# Patient Record
Sex: FEMALE | Race: WHITE | NOT HISPANIC OR LATINO | Employment: FULL TIME | URBAN - METROPOLITAN AREA
[De-identification: names, ages, dates, MRNs, and addresses within clinical notes are randomized per-mention and may not be internally consistent; named-entity substitution may affect disease eponyms.]

---

## 2017-03-16 ENCOUNTER — ALLSCRIPTS OFFICE VISIT (OUTPATIENT)
Dept: OTHER | Facility: OTHER | Age: 47
End: 2017-03-16

## 2017-04-17 ENCOUNTER — GENERIC CONVERSION - ENCOUNTER (OUTPATIENT)
Dept: OTHER | Facility: OTHER | Age: 47
End: 2017-04-17

## 2017-04-21 ENCOUNTER — GENERIC CONVERSION - ENCOUNTER (OUTPATIENT)
Dept: OTHER | Facility: OTHER | Age: 47
End: 2017-04-21

## 2017-04-21 LAB
CHOLEST SERPL-MCNC: 178 MG/DL
HDLC SERPL-MCNC: 64 MG/DL
LDL/HDL RATIO (HISTORICAL): 1
LDLC SERPL CALC-MCNC: 65 MG/DL
TRIGL SERPL-MCNC: 245 MG/DL
VLDLC SERPL CALC-MCNC: 49 MG/DL

## 2017-06-17 ENCOUNTER — GENERIC CONVERSION - ENCOUNTER (OUTPATIENT)
Dept: OTHER | Facility: OTHER | Age: 47
End: 2017-06-17

## 2017-06-26 ENCOUNTER — GENERIC CONVERSION - ENCOUNTER (OUTPATIENT)
Dept: OTHER | Facility: OTHER | Age: 47
End: 2017-06-26

## 2017-07-29 ENCOUNTER — GENERIC CONVERSION - ENCOUNTER (OUTPATIENT)
Dept: OTHER | Facility: OTHER | Age: 47
End: 2017-07-29

## 2017-07-31 ENCOUNTER — ALLSCRIPTS OFFICE VISIT (OUTPATIENT)
Dept: OTHER | Facility: OTHER | Age: 47
End: 2017-07-31

## 2017-08-01 ENCOUNTER — GENERIC CONVERSION - ENCOUNTER (OUTPATIENT)
Dept: OTHER | Facility: OTHER | Age: 47
End: 2017-08-01

## 2017-08-11 LAB
ADEQUACY: (HISTORICAL): NORMAL
CLINICIAN PROVIDIED ICD 9 OR 10 (HISTORICAL): NORMAL
COMMENT (HISTORICAL): NORMAL
DIAGNOSIS (HISTORICAL): NORMAL
NOTE: (HISTORICAL): NORMAL
PERFORMED BY (HISTORICAL): NORMAL
REFLEX (HISTORICAL): NORMAL
TEST INFORMATION (HISTORICAL): NORMAL

## 2017-11-10 ENCOUNTER — APPOINTMENT (OUTPATIENT)
Dept: RADIOLOGY | Facility: CLINIC | Age: 47
End: 2017-11-10
Payer: COMMERCIAL

## 2017-11-10 ENCOUNTER — ALLSCRIPTS OFFICE VISIT (OUTPATIENT)
Dept: OTHER | Facility: OTHER | Age: 47
End: 2017-11-10

## 2017-11-10 DIAGNOSIS — M79.645 PAIN OF FINGER OF LEFT HAND: ICD-10-CM

## 2017-11-10 PROCEDURE — 73140 X-RAY EXAM OF FINGER(S): CPT

## 2017-11-11 NOTE — PROGRESS NOTES
Assessment    1  Primary osteoarthritis of first carpometacarpal joint of left hand (715 14) (M18 12)    Plan  Pain of left thumb    · * XR THUMB LEFT FIRST DIGIT-MIN 2V; Status:Active - Retrospective By ProtocolAuthorization; Requested for:10Nov2017;   Alejandro Valiente has what appears to be some arthritic change at the basilar joint of the thumb  I did give her a thumb spica brace  This appears to be the 1st time that she has been suffering with pain due to the arthritic change  There likely is a subtle ligament injury in there that has started this process  She will utilize the brace and if she has difficulties in the future, she will return to see us  Hopefully, the brace will help stabilize the thumb and decrease her discomfort  We did talk about possible need for injection versus further bracing and therapy and possible need in the future for reconstruction of that joint  Discussion/Summary  The patient was counseled regarding diagnostic results,-- instructions for management,-- prognosis,-- patient and family education,-- impressions,-- risks and benefits of treatment options,-- importance of compliance with treatment  Chief Complaint    1  Hand Problem    History of Present Illness  Alejandro Valiente is a 49-year-old female who was referred to see me today by her primary care physician for a left thumb issue  She has noticed over the past 2 weeks that her left thumb has been painful and clicking  The pain is a mild and dull discomfort that is intermittent and worse when it clicks and somewhat better with rest and radiates over the base of the thumb towards the thumb itself  She denies any actual injury recently or remotely to this thumb  It is somewhat painful to grasp objects  Review of Systems   Constitutional: No fever, no chills, feels well, no tiredness, no recent weight gain or loss  Eyes: No complaints of eyesight problems, no red eyes  ENT: no loss of hearing, no nosebleeds, no sore throat  Cardiovascular: No complaints of chest pain, no palpitations, no leg claudication or lower extremity edema  Respiratory: no compliants of shortness of breath, no wheezing, no cough  Gastrointestinal: no complaints of abdominal pain, no constipation, no nausea or diarrhea, no vomiting, no bloody stools  Genitourinary: no complaints of dysuria, no incontinence  Musculoskeletal: as noted in HPI  Integumentary: no complaints of skin rash or lesion, no itching or dry skin, no skin wounds  Neurological: no complaints of headache, no confusion, no numbness or tingling, no dizziness  Endocrine: No complaints of muscle weakness, no feelings of weakness, no frequent urination, no excessive thirst   Psychiatric: No suicidal thoughts, no anxiety, no feelings of depression  Active Problems  1  Hyperlipidemia (272 4) (E78 5)   2  Hypothyroidism (244 9) (E03 9)   3  Oral contraceptive use (V25 41) (Z30 41)   4  Pain of left thumb (729 5) (M79 645)   5  Penicillin allergy (V14 0) (Z88 0)   6  Type 1 diabetes mellitus without complication (650 25) (K86 6)    Past Medical History   · History of hyperlipidemia (V12 29) (Z86 39)    The active problems and past medical history were reviewed and updated today  Surgical History   · History of Appendectomy   · History of Hand Excision Of Tendon Cyst   · History of Neuroplasty Decompression Median Nerve At Carpal Tunnel   · History of Oral Surgery Tooth Extraction   · History of Tonsillectomy    The surgical history was reviewed and updated today  Family History  Mother    · No pertinent family history  Father    · No pertinent family history  Maternal Grandmother    · Family history of malignant neoplasm (V16 9) (Z80 9)    The family history was reviewed and updated today         Social History     · Cultural background   · NON-   · Daily caffeine consumption, 2-3 servings a day   · Dental care, occasionally   · Former smoker (V15 82) (C78 502)   · No alcohol use   · Oral contraceptive use (V25 41) (Z30 41)   · Primary spoken language English   · Racial background   · WHITE  The social history was reviewed and updated today  Current Meds   1  Accu-Chek FastClix Lancets Miscellaneous; Therapy: (Recorded:11May2015) to Recorded   2  Atorvastatin Calcium 10 MG Oral Tablet; Take 1 tablet daily; Therapy: 67SHY4857 to (Last Rx:08Apr2017)  Requested for: 08Apr2017 Ordered   3  EpiPen 2-Tyler 0 3 MG/0 3ML Injection Solution Auto-injector; INJECT 0 3ML INTRAMUSCULARLY AS DIRECTED; Therapy: 66SCE3472 to (Last Rx:23May2016)  Requested for: 69THC6614 Ordered   4  FreeStyle Lite Device; Therapy: (Recorded:11May2015) to Recorded   5  FreeStyle Lite Test In [employer]; Therapy: (Recorded:11May2015) to Recorded   6  Glucagon Emergency 1 MG Injection Kit; USE AS DIRECTED; Therapy: 57MFJ5219 to (Last Rx:23May2016)  Requested for: 03GLI4088 Ordered   7  HumaLOG 100 UNIT/ML Subcutaneous Solution; Therapy: (Recorded:16Mar2017) to Recorded   8  Pirmella 7/7/7 0 5/0 75/1-35 MG-MCG Oral Tablet; take as directed; Therapy: 30DYK7010 to (Last Rx:19Mar2017)  Requested for: 17YKC9342 Ordered   9  Synthroid 112 MCG Oral Tablet (Levothyroxine Sodium); 1 every morning; Therapy: (Recorded:11May2015) to Recorded    The medication list was reviewed and updated today  Allergies  1  Penicillins   2  Sulfites    Vitals   Recorded: [id number] 01:46PM   Heart Rate 93   Systolic 642   Diastolic 79   Height 5 ft    Weight 175 lb 6 oz   BMI Calculated 34 25   BSA Calculated 1 77       Physical Exam    Left First Digit: Appearance: Normal  Tenderness: 1st CMC joint  Palpatory findings include 1st digit crepitus  ROM: Full   Normal  Constitutional - General appearance: Normal   Musculoskeletal - Gait and station: Normal -- Muscle strength/tone: Normal -- Upper extremity compartments: Normal   Cardiovascular - Pulses: Normal -- Examination of extremities for edema and/or varicosities: Normal   Skin - Skin and subcutaneous tissue: Normal   Neurologic - Sensation: Normal -- Upper extremity peripheral neuro exam: Normal   Psychiatric - Orientation to person, place, and time: Normal -- Mood and affect: Normal   Eyes  Conjunctiva and lids: Normal    Pupils and irises: Normal        Results/Data  I personally reviewed the films/images/results in the office today  My interpretation follows  X-ray Review Left thumb x-rays demonstrate moderate arthritic change at the [facility] joint of the thumb as well as some subluxation of that joint        Future Appointments    Date/Time Provider Specialty Site   01/29/2018 05:00 PM [facility]       Signatures   Electronically signed by : CARL Rehman ; Nov 10 2017  2:20PM EST                       (Author)

## 2018-01-09 NOTE — RESULT NOTES
Verified Results  (1) HEMOGLOBIN A1C 01Apr2017 12:00AM Darcella Blamer     Test Name Result Flag Reference   HEMOGLOBIN A1C 8 8 A       Summary / No summary entered :      No summary entered   Documents attached :      Mateo Rivera; Enc: [id number] - Image Encounter -      Richie Ordaz - (Family Medicine) (Additional Information      Document)  [facility]) Lipid Panel With LDL/HDL Ratio 01Apr2017 12:00AM Darcella Blamer     Test Name Result Flag Reference   Cholesterol, Total 178     Triglycerides 245 A    HDL Cholesterol 64     VLDL Cholesterol Anastacio 49 A    LDL Cholesterol Calc 65     LDL/HDL Ratio 1 0        Summary / No summary entered :      No summary entered   Documents attached :      Mateo Rivera; Enc: [id number] - Image Encounter -      Richie Ordaz - (Family Medicine) (Additional Information      Document)  (1) MICROALBUMIN CREATININE RATIO, RANDOM URINE 01Apr2017 12:00AM Darcella Blamer     Test Name Result Flag Reference   MICROALBUMIN,URINE <3     CREATININE URINE 31 0     MICROALBUMIN/ CREAT RATIO <9 7        Summary / No summary entered :      No summary entered   Documents attached :      Mateo Rivera; Enc: [id number] - Image Encounter -      Richie Ordaz - (Family Medicine) (Additional Information      Document)  (1) TSH [id number] 12:00AM Darcella Blamer     Test Name Result Flag Reference   TSH 2 150        Summary / No summary entered :      No summary entered   Documents attached :      Mateo Rivera; Enc: [id number] - Image Encounter -      Martincella Blamer - [facility]) (Additional Information      Document)

## 2018-01-12 VITALS
WEIGHT: 176 LBS | BODY MASS INDEX: 34.55 KG/M2 | OXYGEN SATURATION: 99 % | RESPIRATION RATE: 20 BRPM | DIASTOLIC BLOOD PRESSURE: 70 MMHG | HEIGHT: 60 IN | SYSTOLIC BLOOD PRESSURE: 140 MMHG | HEART RATE: 82 BPM

## 2018-01-12 NOTE — RESULT NOTES
Message   PLEASE CALL   REVIEWED BW   WAS STABLE   HGB A1C WAS 8 2     Verified Results  (1) CBC/ PLT (NO DIFF) [id number] 12:00AM Tennille Poot     Test Name Result Flag Reference   WBC 14 6 x10E3/uL H 3 4-10 8   RBC 3 96 x10E6/uL  3 77-5 28   Hemoglobin 12 3 g/dL  11 1-15 9   Hematocrit 37 3 %  34 0-46  6   MCV 94 fL  79-97   MCH 31 1 pg  26 6-33 0   MCHC 33 0 g/dL  31 5-35 7   RDW 12 7 %  12 3-15 4   Platelets 983 T45Y3/WE  150-379     (1) HEMOGLOBIN A1C [id number] 12:00AM Tennille Poot     Test Name Result Flag Reference   Hemoglobin A1c 8 2 % H 4 8-5 6   Pre-diabetes: 5 7 - 6 4           Diabetes: >6 4           Glycemic control for adults with diabetes: <7 0     (1) MICROALBUMIN CREATININE RATIO, RANDOM URINE [id number] 12:00AM Tennille Poot     Test Name Result Flag Reference   Creatinine, Urine 37 6 mg/dL  Not Estab  **Please note reference interval change**   Microalbumin, Urine 4 5 ug/mL  Not Estab  **Please note reference interval change**   Microalb/Creat Ratio 12 0 mg/g creat  0 0-30 0     (LC) Pap Lb (Liquid-based) [id number] 12:00AM Tennille Poot   No  of containers  Ferol Wilfrido 01 [employer] Collection Vial     Test Name Result Flag Reference   DIAGNOSIS: Comment     NEGATIVE FOR INTRAEPITHELIAL LESION AND MALIGNANCY  THE CYTOLOGY PROCESSING WAS PERFORMED AT THE [facility] LOCATED AT  [facility] 12, [address] [address]  Specimen adequacy: Comment     Satisfactory for evaluation  Endocervical and/or squamous metaplastic  cells (endocervical component) are present  Clinician provided ICD10: Comment     E78 0   Performed by: Katalina Antoine Cytotechnologist (ASCP)     Prabhjot Anna Note: Comment     The Pap smear is a screening test designed to aid in the detection of  premalignant and malignant conditions of the uterine cervix  It is not a  diagnostic procedure and should not be used as the sole means of detecting  cervical cancer    Both false-positive and false-negative reports do occur  [facility]) Written Authorization [id number] 12:00AM Tonja Smitha     Test Name Result Flag Reference   Written Authorization Comment     Written Authorization Received  Authorization received from ORIGINAL REQUISITION 05-  Logged by Christine Clemente     (1) COMPREHENSIVE METABOLIC PANEL [id number] 51:44LU Tonja Smitha     Test Name Result Flag Reference   Glucose, Serum 285 mg/dL H 65-99   BUN 13 mg/dL  6-24   Creatinine, Serum 0 74 mg/dL  0 57-1 00   eGFR If NonAfricn Am 96 mL/min/1 73  >59   eGFR If Africn Am 111 mL/min/1 73  >59   BUN/Creatinine Ratio 18  9-23   Sodium, Serum 137 mmol/L  134-144   Potassium, Serum 4 3 mmol/L  3 5-5 2   Chloride, Serum 99 mmol/L     Carbon Dioxide, Total 21 mmol/L  18-29   Calcium, Serum 9 5 mg/dL  8 7-10 2   Protein, Total, Serum 6 9 g/dL  6 0-8 5   Albumin, Serum 4 6 g/dL  3 5-5 5   Globulin, Total 2 3 g/dL  1 5-4 5   A/G Ratio 2 0  1 1-2 5   Bilirubin, Total 0 2 mg/dL  0 0-1 2   Alkaline Phosphatase, S 86 IU/L     AST (SGOT) 21 IU/L  0-40   ALT (SGPT) 20 IU/L  0-32     (1) LIPID PANEL, FASTING [id number] 12:00AM Tonja Smitha     Test Name Result Flag Reference   Cholesterol, Total 165 mg/dL  100-199   Triglycerides 212 mg/dL H 0-149   HDL Cholesterol 65 mg/dL  >39   According to ATP-III Guidelines, HDL-C >59 mg/dL is considered a  negative risk factor for CHD  VLDL Cholesterol Anastacio 42 mg/dL H 5-40   LDL Cholesterol Calc 58 mg/dL  0-99   T  Chol/HDL Ratio 2 5 ratio units  0 0-4 4   T  Chol/HDL Ratio                                                             Men  Women                                               1/2 Avg  Risk  3 4    3 3                                                   Avg Risk  5 0    4 4                                                2X Avg  Risk  9 6    7 1                                                3X Avg  Risk 23 4   11 0     (1) TSH [id number] 12:00AM Tonja Smitha     Test Name Result Flag Reference   TSH 1 400 uIU/mL 0 790-4 822

## 2018-01-12 NOTE — PROGRESS NOTES
Assessment    1  Encounter for preventive health examination (V70 0) (Z00 00)   2  Type 1 diabetes mellitus (250 01) (E10 9)   3  Hyperlipidemia (272 4) (E78 5)   4  Hypothyroidism (244 9) (E03 9)    Plan  Health Maintenance    · Always use a seat belt and shoulder strap when riding or driving a motor vehicle ;  Status:Complete;   Done: 34LEC4024   · Begin a limited exercise program ; Status:Complete;   Done: 17YCB9876   · Drink plenty of fluids ; Status:Complete;   Done: 29YYP9511   · Eat a low fat and low cholesterol diet ; Status:Complete;   Done: 32UGL0240   · Use a sun block product with an SPF of 15 or more ; Status:Complete;   Done:  21BMG8143   · We encourage all of our patients to exercise regularly  30 minutes of exercise or physical  activity five or more days a week is recommended for children and adults ;  Status:Complete;   Done: 60JWW7269   · We recommend routine visits to a dentist ; Status:Complete;   Done: 76ZNQ0603   · We recommend that you bring your body mass index down to 26 ; Status:Complete;    Done: 63CQR6590   · (1) CBC/ PLT (NO DIFF); Status: In Progress - Specimen/Data Collected;   Done:  81HPG0861   · (1) COMPREHENSIVE METABOLIC PANEL; Status: In Progress - Specimen/Data  Collected;   Done: 49APO4428  Health Maintenance, Encounter for screening mammogram for breast cancer    · * MAMMO SCREENING BILATERAL W CAD; Status:Complete;   Done: 32ZYT3943  12:00AM  Health Maintenance, Encounter for screening mammogram for breast cancer, High  cholesterol, Hyperlipidemia, Hypothyroidism, Type 1 diabetes mellitus    · *VB - Eye Exam; Status:Active; Requested for:84Pji7925;    · *VB-Foot Exam; Status:Resulted - Requires Verification;   Done: 83DAM0321 05:36PM   · EKG/ECG- POC; Status:Active;  Requested for:64Aid8930;    · Routine Venipuncture - POC; Status:Complete;   Done: 32DIB3168   · Follow-up visit in 6 months Evaluation and Treatment  Follow-up  Status: Complete   Done: 51ZJK2584  Health Maintenance, High cholesterol, Hyperlipidemia, Hypothyroidism, Type 1 diabetes  mellitus    · (1) LIPID PANEL, FASTING; Status: In Progress - Specimen/Data Collected;   Done:  19GYT0186   · (1) TSH; Status: In Progress - Specimen/Data Collected;   Done: 31TLI3405  High cholesterol    · (1) MICROALBUMIN CREATININE RATIO, RANDOM URINE; Status: In Progress -  Specimen/Data Collected;   Done: 49WXK7190   · ([facility]) Pap Lb (Liquid-based); Status:Active; Requested for:62Tpo6936;    · Hemoccult Screening - POC; Status:Active; Requested for:87Waj8000;    · Urine Dip Automated- POC; Status:Resulted - Requires Verification;   Done: 86KOG0424  05:07PM  High cholesterol, Type 1 diabetes mellitus    · (1) HEMOGLOBIN A1C; Status: In Progress - Specimen/Data Collected;   Done:  45EFQ4176    Discussion/Summary  health maintenance visit healthy adult female Currently, she eats a healthy diet  cervical cancer screening is current Pap test was done today Breast cancer screening: mammogram is current  Colorectal cancer screening: colorectal cancer screening is current and fecal occult blood testing is needed every year  Osteoporosis screening: bone mineral density testing is not indicated  Screening lab work includes hemoglobin, glucose, lipid profile, thyroid function testing, 25-hydroxyvitamin D and urinalysis  The immunizations are up to date  She was advised to be evaluated by an ophthalmologist and Delmi Knutson  Advice and education were given regarding aerobic exercise, cardiovascular risk reduction, sunscreen use and seat belt use  DISCUSSED HEALTH MAINTENANCE ISSUES  CONTINUE ENDOCRINE VISITS Q 3MONTHS  BW  RV 6 MONTHS  Chief Complaint  Patient is here today for a complete physical exam, PAP and blood work  lb/lpn      History of Present Illness  HM, Adult Female: The patient is being seen for a health maintenance and gynecology evaluation  General Health:  The patient's health since the last visit is described as good  She has regular dental visits  She denies vision problems  She denies hearing loss  Immunizations status: not up to date  Lifestyle:  She consumes a diverse and healthy diet  She has weight concerns  She does not exercise regularly  She does not use tobacco  She denies alcohol use  Reproductive health: the patient is premenopausal    Screening: cancer screening reviewed and current  metabolic screening reviewed and current  risk screening reviewed and current  HPI: [address] [place] [address] Rd RECORD  WEIGHT CONCERNS      Review of Systems    Constitutional: no fever, no chills and not feeling tired  Eyes: no eyesight problems and no purulent discharge from the eyes  ENT: no sore throat and no nasal discharge  Cardiovascular: no chest pain, the heart rate was not fast, no palpitations and no lower extremity edema  Respiratory: no shortness of breath, no cough, no wheezing and no shortness of breath during exertion  Gastrointestinal: no abdominal pain, no nausea, no vomiting and no diarrhea  Genitourinary: no dysuria  Musculoskeletal: no arthralgias, no joint swelling, no myalgias and no joint stiffness  Integumentary: no rashes  Neurological: no headache, no numbness, no tingling and no dizziness  Psychiatric: no anxiety and no depression  Endocrine: no muscle weakness  Hematologic/Lymphatic: no swollen glands  ROS reviewed  Active Problems    1  Encounter for screening mammogram for breast cancer (V76 12) (Z12 31)   2  High cholesterol (272 0) (E78 0)   3  Hyperlipidemia (272 4) (E78 5)   4  Hypothyroidism (244 9) (E03 9)   5  Oral contraceptive use (V25 41) (Z30 41)   6   Type 1 diabetes mellitus (250 01) (E10 9)    Past Medical History    · History of Breast cancer screening (V76 10) (Z12 39)   · History of hyperlipidemia (V12 29) (Z86 39)    Surgical History    · History of Appendectomy   · History of Hand Excision Of Tendon Cyst   · History of Neuroplasty Decompression Median Nerve At Carpal Tunnel   · History of Oral Surgery Tooth Extraction   · History of Tonsillectomy    Family History  Mother    · No pertinent family history  Father    · No pertinent family history    Social History    · Cultural background   · NON-   · Daily caffeine consumption, 2-3 servings a day   · Former smoker (V15 82) (B42 643)   · No alcohol use   · Oral contraceptive use (V25 41) (Z30 41)   · Primary spoken language English   · Racial background   · WHITE    Current Meds   1  Accu-Chek FastClix Lancets Miscellaneous; Therapy: (Recorded:11May2015) to Recorded   2  Alyacen 7/7/7 0 5/0 75/1-35 MG-MCG Oral Tablet; as directed; Therapy: 42PPK1417 to (Evaluate:18Ury5455)  Requested for: 02Apr2016; Last   Rx:02Apr2016 Ordered   3  Atorvastatin Calcium 10 MG Oral Tablet; 1 every day; Therapy: 62RAP1575 to (Evaluate:14Jun2016)  Requested for: 15Apr2016; Last   Rx:21Jji1260 Ordered   4  Dasetta 7/7/7 0 5/0 75/1-35 MG-MCG Oral Tablet; as directed; Therapy: 63Evl0270 to (Evaluate:62Cez7887)  Requested for: 83NFD7234; Last   TY:88OWT4413 Ordered   5  FreeStyle Lite Device; Therapy: (Recorded:11May2015) to Recorded   6  FreeStyle Lite Test In [employer]; Therapy: (Recorded:11May2015) to Recorded   7  NovoLOG 100 UNIT/ML Subcutaneous Solution; Sliding Scale administer via pump; Therapy: (Fayette Lesches) to Recorded   8  Synthroid 112 MCG Oral Tablet; 1 every morning; Therapy: (Recorded:11May2015) to Recorded    Allergies    1  Penicillins   2   Sulfites    Vitals   Recorded: 76YYW0653 04:51PM   Temperature 98 1 F, Tympanic    Heart Rate 84, L Radial    Pulse Quality Normal, L Radial    Respiration 16    Respiration Quality Normal    Systolic 313, LUE, Sitting    Diastolic 86, LUE, Sitting    Height 4 ft 11 75 in    Weight 163 lb     BMI Calculated 32 1    BSA Calculated 1 71    Patient Refused Height No No   Patient Refused Weight No No     Physical Exam    Constitutional   General appearance: No acute distress, well appearing and well nourished  Head and Face   Head and face: Normal     Palpation of the face and sinuses: No sinus tenderness  Eyes   Conjunctiva and lids: No swelling, erythema or discharge  Pupils and irises: Equal, round, reactive to light  Ophthalmoscopic examination: Normal fundi and optic discs  Ears, Nose, Mouth, and Throat   External inspection of ears and nose: Normal     Otoscopic examination: Tympanic membranes translucent with normal light reflex  Canals patent without erythema  Nasal mucosa, septum, and turbinates: Normal without edema or erythema  Lips, teeth, and gums: Normal, good dentition  Oropharynx: Normal with no erythema, edema, exudate or lesions  Neck   Neck: Supple, symmetric, trachea midline, no masses  Thyroid: Normal, no thyromegaly  Pulmonary   Respiratory effort: No increased work of breathing or signs of respiratory distress  Auscultation of lungs: Clear to auscultation  Cardiovascular   Auscultation of heart: Normal rate and rhythm, normal S1 and S2, no murmurs  Carotid pulses: 2+ bilaterally  Abdominal aorta: Normal     Femoral pulses: 2+ bilaterally  Pedal pulses: 2+ bilaterally  Peripheral vascular exam: Normal     Examination of extremities for edema and/or varicosities: Normal     Chest   Palpation of breasts and axillae: Normal, no masses palpated  Abdomen   Abdomen: Non-tender, no masses  Liver and spleen: No hepatomegaly or splenomegaly  Examination for hernias: No hernia appreciated  Anus, perineum, and rectum: Normal sphincter tone, no masses, no prolapse  Stool sample for occult blood: Negative  STOOL HEME NEG  Genitourinary   External genitalia and vagina: Normal, no lesions appreciated  Urethra: Normal, no discharge  Bladder: Not distended, no tenderness  Cervix: Normal, no lesions, Pap obtained      Uterus: Normal size, no tenderness, no masses  Adnexa/Parametria: Normal, no masses or tenderness  Lymphatic   Palpation of lymph nodes in neck: No lymphadenopathy  Palpation of lymph nodes in axillae: No lymphadenopathy  Palpation of lymph nodes in groin: No lymphadenopathy  Musculoskeletal   Gait and station: Normal     Digits and nails: Normal without clubbing or cyanosis  Joints, bones, and muscles: Normal     Range of motion: Normal     Stability: Normal     Muscle strength/tone: Normal     Skin   Skin and subcutaneous tissue: Normal without rashes or lesions  Neurologic   Cranial nerves: Cranial nerves II-XII intact  Cortical function: Normal mental status  Reflexes: 2+ and symmetric  Sensation: No sensory loss  Coordination: Normal finger to nose and heel to shin  Psychiatric   Judgment and insight: Normal     Orientation to person, place, and time: Normal     Mood and affect: Normal     Right Foot Findings: normal foot  The toes were normal  Left Foot Findings: normal foot  The toes were normal    Monofilament Testing: normal tactile sensation with monofilament testing throughout both feet  Vascular: Pulses: 2+ in the posterior tibialis and 2+ in the dorsalis pedis  Pulses: 2+ in the posterior tibialis and 2+ in the dorsalis pedis  Assign Risk Category: 0: No loss of protective sensation, no deformity  No present risk        Results/Data  Urine Dip Automated- POC [id number] 05:07PM Cindra      Test Name Result Flag Reference   Color Yellow     Clarity Hazy     Leukocytes Negative     Nitrite Negative     Blood Negative     Bilirubin Negative     Urobilinogen Normal     Protein Negative     Ph 6 0     Specific Gravity 1 020     Ketone Negative     Glucose >1000       PHQ-2 Adult Depression Screening 48DBG3402 04:57PM User, Babbles     Test Name Result Flag Reference   PHQ-2 Adult Depression Score 0     Over the last two weeks, how often have you been bothered by any of the following problems?   Little interest or pleasure in doing things: Not at all - 0  Feeling down, depressed, or hopeless: Not at all - 0   PHQ-2 Adult Depression Screening Negative       * MAMMO SCREENING BILATERAL W CAD 12UZV2114 12:00AM Kit Santos     Test Name Result Flag Reference   [facility]AREN [facility] SCREENING BILATERAL W CAD 05/21/2016       Summary / No summary entered :      No summary entered  Documents attached :      Leonard Gray: [id number] - Image Encounter - Mario Graham -      (Family Medicine) (Result Document)    Signatures   Electronically signed by : Bill Casanova MD; May 23 2016  5:36PM EST                       (Author)

## 2018-01-13 VITALS
BODY MASS INDEX: 34.55 KG/M2 | HEIGHT: 60 IN | DIASTOLIC BLOOD PRESSURE: 80 MMHG | OXYGEN SATURATION: 99 % | TEMPERATURE: 98.8 F | HEART RATE: 99 BPM | WEIGHT: 176 LBS | SYSTOLIC BLOOD PRESSURE: 128 MMHG | RESPIRATION RATE: 20 BRPM

## 2018-01-14 VITALS
BODY MASS INDEX: 34.43 KG/M2 | SYSTOLIC BLOOD PRESSURE: 125 MMHG | DIASTOLIC BLOOD PRESSURE: 79 MMHG | HEART RATE: 93 BPM | WEIGHT: 175.38 LBS | HEIGHT: 60 IN

## 2018-04-14 LAB — HBA1C MFR BLD HPLC: 9.3 %

## 2018-05-22 DIAGNOSIS — Z30.41 ORAL CONTRACEPTIVE PILL SURVEILLANCE: Primary | ICD-10-CM

## 2018-05-22 DIAGNOSIS — Z30.41 ORAL CONTRACEPTIVE PILL SURVEILLANCE: ICD-10-CM

## 2018-05-22 RX ORDER — NORETHINDRONE AND ETHINYL ESTRADIOL 7 DAYS X 3
KIT ORAL
Qty: 84 TABLET | Refills: 1 | Status: SHIPPED | OUTPATIENT
Start: 2018-05-22 | End: 2019-03-21 | Stop reason: SDUPTHER

## 2018-07-14 LAB
CREAT ?TM UR-SCNC: 46.2 UMOL/L
EXT MICROALBUMIN URINE RANDOM: 21.1
HBA1C MFR BLD HPLC: 8.9 %
MICROALBUMIN/CREAT UR: 45.7 MG/G{CREAT}

## 2018-09-28 ENCOUNTER — APPOINTMENT (OUTPATIENT)
Dept: RADIOLOGY | Facility: CLINIC | Age: 48
End: 2018-09-28
Payer: COMMERCIAL

## 2018-09-28 ENCOUNTER — OFFICE VISIT (OUTPATIENT)
Dept: OBGYN CLINIC | Facility: CLINIC | Age: 48
End: 2018-09-28
Payer: COMMERCIAL

## 2018-09-28 VITALS
HEIGHT: 61 IN | SYSTOLIC BLOOD PRESSURE: 139 MMHG | BODY MASS INDEX: 33.27 KG/M2 | WEIGHT: 176.2 LBS | HEART RATE: 87 BPM | DIASTOLIC BLOOD PRESSURE: 86 MMHG

## 2018-09-28 DIAGNOSIS — M25.511 RIGHT SHOULDER PAIN, UNSPECIFIED CHRONICITY: Primary | ICD-10-CM

## 2018-09-28 DIAGNOSIS — M25.511 RIGHT SHOULDER PAIN, UNSPECIFIED CHRONICITY: ICD-10-CM

## 2018-09-28 DIAGNOSIS — S46.911A SHOULDER STRAIN, RIGHT, INITIAL ENCOUNTER: ICD-10-CM

## 2018-09-28 PROCEDURE — 99214 OFFICE O/P EST MOD 30 MIN: CPT | Performed by: ORTHOPAEDIC SURGERY

## 2018-09-28 PROCEDURE — 73030 X-RAY EXAM OF SHOULDER: CPT

## 2018-09-28 RX ORDER — EPINEPHRINE 0.3 MG/.3ML
0.3 INJECTION SUBCUTANEOUS
COMMUNITY
Start: 2016-05-23 | End: 2018-12-10 | Stop reason: SDUPTHER

## 2018-09-28 RX ORDER — ATORVASTATIN CALCIUM 10 MG/1
1 TABLET, FILM COATED ORAL DAILY
COMMUNITY
Start: 2014-10-05 | End: 2018-10-08 | Stop reason: SDUPTHER

## 2018-09-28 RX ORDER — BLOOD-GLUCOSE METER
KIT MISCELLANEOUS
COMMUNITY
End: 2022-07-05

## 2018-09-28 RX ORDER — LEVOTHYROXINE SODIUM 112 UG/1
TABLET ORAL
COMMUNITY
End: 2020-05-03 | Stop reason: ALTCHOICE

## 2018-09-28 NOTE — PROGRESS NOTES
Assessment/Plan:  1  Right shoulder pain, unspecified chronicity  XR shoulder 2+ vw right   2  Shoulder strain, right, initial encounter       Scribe Attestation    I,:   Rinawoody Chadwick Machuca am acting as a scribe while in the presence of the attending physician :        I,:   Daniel Mendoza MD personally performed the services described in this documentation    as scribed in my presence :            Prince Parsons is presenting with signs and symptoms consistent with a right shoulder strain with  paraspinal involvement  Currently it is appears as though this injury has resolved  I did provide her exercises to perform at home that address shoulder strengthening and cervical range of motion  Prince Parsons is a type 1 diabetic  Her most recent A1c reading was 8 1  I explained to her that this puts her at risk for adhesive capsulitis  It is important for her to maintain shoulder range of motion  She has excellent range of motion upon examination today  She can follow up with me as needed for this    Subjective:    Elgin Parra is a 46 y o  diabetic female who presents today for evaluation of a previous pain that she was experiencing the past few weeks  She states she did not suffer from any traumatic events but developed a pain located in the periscapular region of the right shoulder  She states this intermittent pain could be sharp and severe and radiates into the right trapezius region  She had no complaints of distal paresthesias  She would apply heat and take ibuprofen for the relief  She states that at this time her pain has resolved  She will experience mild discomfort when rotating the head to the far left or cervical flexion  Review of Systems   Constitutional: Negative for chills, fever and unexpected weight change  HENT: Negative for hearing loss, nosebleeds and sore throat  Eyes: Negative for pain, redness and visual disturbance  Respiratory: Negative for cough, shortness of breath and wheezing  Cardiovascular: Negative for chest pain, palpitations and leg swelling  Gastrointestinal: Negative for abdominal pain, nausea and vomiting  Endocrine: Negative for polydipsia and polyuria  Genitourinary: Negative for dysuria and hematuria  Musculoskeletal:        See HPI   Skin: Negative for rash and wound  Neurological: Negative for dizziness, numbness and headaches  Psychiatric/Behavioral: Negative for decreased concentration and suicidal ideas  The patient is not nervous/anxious  Past Medical History:   Diagnosis Date    Diabetes ([address] )     Disease of thyroid gland     Hyperlipidemia        Past Surgical History:   Procedure Laterality Date    APPENDECTOMY      CARPAL TUNNEL RELEASE Bilateral     GANGLION CYST EXCISION      TONSILLECTOMY      WISDOM TOOTH EXTRACTION         No family history on file  Social History     Occupational History    Not on file  Social History Main Topics    Smoking status: Former Smoker    Smokeless tobacco: Never Used    Alcohol use No    Drug use: No    Sexual activity: Not on file         Current Outpatient Prescriptions:     atorvastatin (LIPITOR) 10 mg tablet, Take 1 tablet by mouth daily, Disp: , Rfl:     Blood Glucose Monitoring Suppl (FREESTYLE FREEDOM LITE) w/Device KIT, by Does not apply route, Disp: , Rfl:     EPINEPHrine (EPIPEN 2-JASON) 0 3 mg/0 3 mL SOAJ, Inject 0 3 mL as directed, Disp: , Rfl:     glucagon (GLUCAGON EMERGENCY) 1 MG injection, Inject as directed, Disp: , Rfl:     insulin lispro (HUMALOG) 100 units/mL injection, Inject under the skin, Disp: , Rfl:     levothyroxine (SYNTHROID) 112 mcg tablet, Take by mouth, Disp: , Rfl:     PIRMELLA 7/7/7 0 5/0 75/1-35 MG-MCG per tablet, TAKE AS DIRECTED, Disp: 84 tablet, Rfl: 1    Allergies   Allergen Reactions    Penicillins     Sulfites        Objective:  Vitals:    09/28/18 0756   BP: 139/86   Pulse: 87    The cervical spine is nontender    She has full pain-free range of motion  Normal sensations to light touch in the upper extremities  Right Shoulder Exam     Tenderness   The patient is experiencing tenderness in the biceps tendon  Range of Motion   Active Abduction: 170   Forward Flexion: 180   External Rotation: 80   Internal Rotation 0 degrees: T10     Muscle Strength   Abduction: 5/5   Internal Rotation: 5/5   External Rotation: 5/5   Supraspinatus: 5/5   Subscapularis: 5/5   Biceps: 5/5     Tests   Impingement: negative    Other   Erythema: absent  Sensation: normal  Pulse: present (2+ radial)    Comments:  Empty can and full can negative  Physical Exam   Constitutional: She is oriented to person, place, and time  She appears well-developed and well-nourished  HENT:   Head: Normocephalic and atraumatic  Eyes: Conjunctivae are normal    Neck: Neck supple  Cardiovascular: Intact distal pulses  Pulmonary/Chest: Effort normal    Neurological: She is alert and oriented to person, place, and time  Skin: Skin is warm and dry  Psychiatric: She has a normal mood and affect  Her behavior is normal    Vitals reviewed  I have personally reviewed pertinent films in PACS and my interpretation is as follows:  Xrays of the right shoulder are normal   There is no evidence of acute fracture or other osseous abnormality

## 2018-09-28 NOTE — PATIENT INSTRUCTIONS
Exercises for Shoulder Abduction and Adduction   WHAT YOU NEED TO KNOW:   Shoulder abduction and adduction exercises work the muscles at the back of your shoulder and your upper back  DISCHARGE INSTRUCTIONS:   Contact your healthcare provider if:   · You have sharp or worsening pain during exercise or at rest     · You have questions or concerns about your shoulder exercises  Before you exercise:  Warm up and stretch before you exercise  Walk or ride a stationary bike for 5 to 10 minutes to help you warm up  Stretching helps increase range of motion  It may also decrease muscle soreness and help prevent another injury  Your healthcare provider will tell you which of the following stretches to do:  · Crossover arm stretch:  Relax your shoulders  Hold your upper arm with the opposite hand  Pull your arm across your chest until you feel a stretch  Hold the stretch for 30 seconds  Return to the starting position  · Shoulder flexion stretch:  Stand facing a wall  Slowly walk your fingers up the wall until you feel a stretch  Hold the stretch for 30 seconds  Return to the starting position  · Sleeper stretch:  Lie on your injured side on a firm, flat surface  Bend the elbow of your injured arm 90° with your hand facing up  Use your arm that is not injured to slowly push your injured arm down  Stop when you feel a stretch at the back of your injured shoulder  Hold the stretch for 30 seconds  Slowly return to the starting position  How to exercise with a weight:  Your healthcare provider will tell you how much weight to use  · Shoulder abduction:  Stand and hold a weight in your hand with your palm facing your body  Slowly raise your arm to the side with your thumb pointing up  Then raise your arm over your head as far as you can without pain  Hold this position for as long as directed  Do not raise your arm over your head unless your healthcare provider says it is okay        · Shoulder adduction:  Lie on your back on a firm surface  Extend your arm out to a "T " Bend your elbow so your forearm in the air  Hold a weight in your hand  Slowly raise your arm toward the ceiling and straighten your elbow  Hold this position for as long as directed  Slowly return to the starting position  How to exercise with an exercise band:   · Shoulder abduction:  Wrap the exercise band around a heavy, stable object near your foot  Grab the band with the hand of your injured shoulder  Keep your arm straight  Slowly raise your arm to the side with your thumb pointing up  Then, slowly pull the band over your head as far as you can without pain  Do not raise your arm over your head unless your healthcare provider says it is okay  Do not let your shoulder shrug  Hold this position for as long as directed  Slowly return to the starting position  · Shoulder adduction:  Wrap the exercise band around a heavy, stable object  Stand and face away from where the band is anchored  Hold each end of the band in both hands with your elbows bent  Your elbows should not be behind your body  Keep your arms parallel to the floor and slowly straighten your elbows  Hold this position for as long as directed  Slowly return to the starting position  Follow up with your physical therapist as directed:  Write down your questions so you remember to ask them at your visits  © 2017 [address] Information is for End User's use only and may not be sold, redistributed or otherwise used for commercial purposes  All illustrations and images included in CareNotes® are the copyrighted property of A D A M [employer]  or Russ Wade  The above information is an  only  It is not intended as medical advice for individual conditions or treatments  Talk to your doctor, nurse or pharmacist before following any medical regimen to see if it is safe and effective for you    Neck Exercises   WHAT YOU NEED TO KNOW:   Why is it important to do neck exercises? Neck exercises help reduce neck pain, and improve neck movement and strength  Neck exercises also help prevent long-term neck problems  What do I need to know about neck exercises? · Do the exercises every day,  or as often as directed by your healthcare provider  · Move slowly, gently, and smoothly  Avoid fast or jerky motions  · Stand and sit the way your healthcare provider shows you  Good posture may reduce your neck pain  Check your posture often, even when you are not doing your neck exercises  How do I perform neck exercises safely? · Exercise position:  You may sit or stand while you do neck exercises  Face forward  Your shoulders should be straight and relaxed, with a good posture  · Head tilts, forward and back:  Gently bow your head and try to touch your chin to your chest  Your healthcare provider may tell you to push on the back of your neck to help bow your head  Raise your chin back to the starting position  Tilt your head back as far as possible so you are looking up at the ceiling  Your healthcare provider may tell you to lift your chin to help tilt your head back  Return your head to the starting position  · Head tilts, side to side:  Tilt your head, bringing your ear toward your shoulder  Then tilt your head toward the other shoulder  · Head turns:  Turn your head to look over your shoulder  Tilt your chin down and try to touch it to your shoulder  Do not raise your shoulder to your chin  Face forward again  Do the same on the other side  · Head rolls:  Slowly bring your chin toward your chest  Next, roll your head to the right  Your ear should be positioned over your shoulder  Hold this position for 5 seconds  Roll your head back toward your chest and to the left into the same position  Hold for 5 seconds  Gently roll your head back and around in a clockwise [place] 3 times   Next, move your head in the reverse direction (counterclockwise) in a [place] 3 times  Do not shrug your shoulders upwards while you do this exercise  When should I contact my healthcare provider? · Your pain does not get better, or gets worse  · You have questions or concerns about your condition, care, or exercise program   CARE AGREEMENT:   You have the right to help plan your care  Learn about your health condition and how it may be treated  Discuss treatment options with your caregivers to decide what care you want to receive  You always have the right to refuse treatment  The above information is an  only  It is not intended as medical advice for individual conditions or treatments  Talk to your doctor, nurse or pharmacist before following any medical regimen to see if it is safe and effective for you  © 2017 [address] Information is for End User's use only and may not be sold, redistributed or otherwise used for commercial purposes  All illustrations and images included in CareNotes® are the copyrighted property of [employer]  or Russ Wade

## 2018-10-08 DIAGNOSIS — E78.01 FAMILIAL HYPERCHOLESTEROLEMIA: Primary | ICD-10-CM

## 2018-10-08 RX ORDER — ATORVASTATIN CALCIUM 10 MG/1
TABLET, FILM COATED ORAL DAILY
Qty: 90 TABLET | Refills: 1 | Status: SHIPPED | OUTPATIENT
Start: 2018-10-08 | End: 2019-03-20 | Stop reason: SDUPTHER

## 2018-10-13 LAB
HBA1C MFR BLD HPLC: 8.8 %
HBA1C MFR BLD HPLC: 8.8 %

## 2018-11-13 ENCOUNTER — OFFICE VISIT (OUTPATIENT)
Dept: FAMILY MEDICINE CLINIC | Facility: CLINIC | Age: 48
End: 2018-11-13
Payer: COMMERCIAL

## 2018-11-13 VITALS
OXYGEN SATURATION: 98 % | WEIGHT: 164 LBS | TEMPERATURE: 98.1 F | DIASTOLIC BLOOD PRESSURE: 84 MMHG | HEART RATE: 88 BPM | RESPIRATION RATE: 16 BRPM | HEIGHT: 60 IN | BODY MASS INDEX: 32.2 KG/M2 | SYSTOLIC BLOOD PRESSURE: 140 MMHG

## 2018-11-13 DIAGNOSIS — Z12.11 COLON CANCER SCREENING: ICD-10-CM

## 2018-11-13 DIAGNOSIS — Z00.00 ROUTINE GENERAL MEDICAL EXAMINATION AT A HEALTH CARE FACILITY: Primary | ICD-10-CM

## 2018-11-13 DIAGNOSIS — E10.9 TYPE 1 DIABETES MELLITUS WITHOUT COMPLICATION (HCC): ICD-10-CM

## 2018-11-13 DIAGNOSIS — Z12.31 ENCOUNTER FOR SCREENING MAMMOGRAM FOR BREAST CANCER: ICD-10-CM

## 2018-11-13 DIAGNOSIS — E78.01 FAMILIAL HYPERCHOLESTEROLEMIA: ICD-10-CM

## 2018-11-13 DIAGNOSIS — E03.9 HYPOTHYROIDISM, UNSPECIFIED TYPE: ICD-10-CM

## 2018-11-13 DIAGNOSIS — Z13.6 SCREENING FOR HYPERTENSION: ICD-10-CM

## 2018-11-13 DIAGNOSIS — Z23 NEED FOR TETANUS BOOSTER: ICD-10-CM

## 2018-11-13 PROCEDURE — 99396 PREV VISIT EST AGE 40-64: CPT | Performed by: FAMILY MEDICINE

## 2018-11-13 PROCEDURE — 90471 IMMUNIZATION ADMIN: CPT

## 2018-11-13 PROCEDURE — 90715 TDAP VACCINE 7 YRS/> IM: CPT

## 2018-11-13 PROCEDURE — 93000 ELECTROCARDIOGRAM COMPLETE: CPT | Performed by: FAMILY MEDICINE

## 2018-11-13 NOTE — PATIENT INSTRUCTIONS
DISCUSSED HEALTH ISSUES  HEALTHY DIET AND EXERCISE  BW WILL BE OBTAINED  MAMMOGRAPHY   RECOMMEND CALCIUM 1252-3347 MG DAILY  VITAMIN D3  1000 IU DAILY  RV IN 1 YEAR FOR ANNUAL EXAM, SOONER IF NEEDED

## 2018-11-14 LAB — ECG INTERP DURING EX: NORMAL MS

## 2018-11-14 NOTE — PROGRESS NOTES
FAMILY PRACTICE HEALTH MAINTENANCE OFFICE VISIT  [facility] PH[facility]ANS    NAME: Enrrique Iyer  AGE: 46 y o  SEX: female  : 1970     DATE: 2018    Assessment and Plan     Problem List Items Addressed This Visit        Endocrine    Type 1 diabetes mellitus without complication ([facility] [address] )    Hypothyroidism       Other    Hyperlipidemia    Routine general medical examination at a health care facility - Primary    Screening for hypertension    Relevant Orders    POCT ECG (Completed)    POCT urine dip auto non-scope    Encounter for screening mammogram for breast cancer    Relevant Orders    Mammo screening bilateral w cad    Colon cancer screening    Relevant Orders    Cologuard    Need for tetanus booster    Relevant Orders    TDAP VACCINE GREATER THAN OR EQUAL TO 8YO IM (Completed)               Return in about 6 months (around 2019) for Recheck  Chief Complaint     Chief Complaint   Patient presents with    Physical Exam     PAP 2017, refill epi pen & glucogon    Screening Colonoscopy     colonoscopy vs cologuard    Screening Mammo    Immunizations     Td, Shringrix, Pneumonia shot - please discuss all       History of Present Illness     DISCUSSED HEALTH ISSUES  REVIEWED MEDICAL RECORD  NO CONCERNS AT THIS TIME          Well Adult Physical   Patient here for a comprehensive physical exam       Diet and Physical Activity  Diet: well balanced diet  Weight concerns: Patient has class 1 obesity (BMI 30-34  9)  Exercise: infrequently      Depression Screen  PHQ-9 Depression Screening    PHQ-9:    Frequency of the following problems over the past two weeks:       Little interest or pleasure in doing things:  0 - not at all  Feeling down, depressed, or hopeless:  0 - not at all  PHQ-2 Score:  0          General Health  Hearing: Normal:  bilateral  Vision: no vision problems  Dental: regular dental visits    Reproductive Health          The following portions of the patient's history were reviewed and updated as appropriate: allergies, current medications, past family history, past medical history, past social history, past surgical history and problem list     Review of Systems     Review of Systems   Constitutional: Negative for chills, fatigue and fever  HENT: Negative for congestion, ear discharge, ear pain, mouth sores, postnasal drip, sore throat and trouble swallowing  Eyes: Negative for pain, discharge and visual disturbance  Respiratory: Negative for cough, shortness of breath and wheezing  Cardiovascular: Negative for chest pain, palpitations and leg swelling  Gastrointestinal: Negative for abdominal distention, abdominal pain, blood in stool, diarrhea and nausea  Endocrine: Negative for polydipsia, polyphagia and polyuria  Genitourinary: Negative for dysuria, frequency, hematuria and urgency  Musculoskeletal: Negative for arthralgias, gait problem and joint swelling  Skin: Negative for pallor and rash  Neurological: Negative for dizziness, syncope, speech difficulty, weakness, light-headedness, numbness and headaches  Hematological: Negative for adenopathy  Psychiatric/Behavioral: Negative for behavioral problems, confusion and sleep disturbance  The patient is not nervous/anxious          Past Medical History     Past Medical History:   Diagnosis Date    Diabetes ([facility] [address]     Disease of thyroid gland     Hyperlipidemia     last assessed 5/11/15       Past Surgical History     Past Surgical History:   Procedure Laterality Date    APPENDECTOMY      CARPAL TUNNEL RELEASE Bilateral     CYST REMOVAL      hand, tendon cyst    GANGLION CYST EXCISION      TONSILLECTOMY      WISDOM TOOTH EXTRACTION         Social History     Social History     Social History    Marital status: Single     Spouse name: N/A    Number of children: N/A    Years of education: N/A     Social History Main Topics    Smoking status: Former Smoker    Smokeless tobacco: Never Used    Alcohol use No    Drug use: No    Sexual activity: Not on file     Other Topics Concern    Not on file     Social History Narrative    Daily caffeine consumption;2-3 serving a day    Dental care occasionally           Family History     Family History   Problem Relation Age of Onset    No Known Problems Mother     No Known Problems Father     Cancer Maternal Grandmother     Mental illness Neg Hx     Substance Abuse Neg Hx        Current Medications       Current Outpatient Prescriptions:     atorvastatin (LIPITOR) 10 mg tablet, TAKE 1 TABLET BY MOUTH  DAILY, Disp: 90 tablet, Rfl: 1    Blood Glucose Monitoring Suppl (FREESTYLE FREEDOM LITE) w/Device KIT, by Does not apply route, Disp: , Rfl:     EPINEPHrine (EPIPEN 2-JASON) 0 3 mg/0 3 mL SOAJ, Inject 0 3 mL as directed, Disp: , Rfl:     glucagon (GLUCAGON EMERGENCY) 1 MG injection, Inject as directed, Disp: , Rfl:     insulin lispro (HUMALOG) 100 units/mL injection, Inject under the skin, Disp: , Rfl:     levothyroxine (SYNTHROID) 112 mcg tablet, Take by mouth, Disp: , Rfl:     PIRMELLA 7/7/7 0 5/0 75/1-35 MG-MCG per tablet, TAKE AS DIRECTED, Disp: 84 tablet, Rfl: 1     Allergies     Allergies   Allergen Reactions    Penicillins     Sulfites        Objective     /84 (BP Location: Right arm, Patient Position: Sitting, Cuff Size: Standard)   Pulse 88   Temp 98 1 °F (36 7 °C) (Temporal)   Resp 16   Ht 5' (1 524 m)   Wt 74 4 kg (164 lb)   SpO2 98%   BMI 32 03 kg/m²      Physical Exam   Constitutional: She is oriented to person, place, and time  She appears well-developed and well-nourished  HENT:   Head: Normocephalic and atraumatic  Right Ear: External ear normal    Left Ear: External ear normal    Nose: Nose normal    Mouth/Throat: Oropharynx is clear and moist    Eyes: Pupils are equal, round, and reactive to light  Conjunctivae and EOM are normal  Right eye exhibits no discharge  Left eye exhibits no discharge  Neck: Normal range of motion  Neck supple  No thyromegaly present  Cardiovascular: Normal rate, regular rhythm, normal heart sounds and intact distal pulses  No murmur heard  Pulmonary/Chest: Effort normal and breath sounds normal  She has no wheezes  She has no rales  Abdominal: Soft  Bowel sounds are normal  She exhibits no distension and no mass  There is no tenderness  There is no rebound and no guarding  Genitourinary: No breast swelling, tenderness or discharge  Musculoskeletal: Normal range of motion  She exhibits no edema, tenderness or deformity  Lymphadenopathy:     She has no cervical adenopathy  Neurological: She is alert and oriented to person, place, and time  She has normal reflexes  No cranial nerve deficit  She exhibits normal muscle tone  Coordination normal    Skin: Skin is warm and dry  No rash noted  No erythema  Psychiatric: She has a normal mood and affect  Her behavior is normal  Judgment and thought content normal    Vitals reviewed          No exam data present    Health Maintenance     Health Maintenance   Topic Date Due    CRC Screening: Colonoscopy  1970    DM Eye Exam  04/03/1978    HEMOGLOBIN A1C  11/23/2016    DTaP,Tdap,and Td Vaccines (2 - Td) 05/23/2017    URINE MICROALBUMIN  05/23/2017    MAMMOGRAM  06/17/2018    INFLUENZA VACCINE  07/01/2018    Diabetic Foot Exam  07/31/2018    Depression Screening PHQ  11/13/2019    Pneumococcal PPSV23 Medium Risk Adult  Completed     Immunization History   Administered Date(s) Administered     Influenza (IM) Preservative Free 09/15/2015    Influenza 10/23/2018, 10/23/2018    Influenza Quadrivalent Preservative Free 3 years and older IM 10/01/2016    Influenza TIV (IM) 09/17/2014    Pneumococcal Polysaccharide PPV23 12/03/2001, 04/03/2013    Tdap 05/23/2007, 11/13/2018       Main Bettencourt MD  [facility]

## 2018-11-20 ENCOUNTER — TELEPHONE (OUTPATIENT)
Dept: FAMILY MEDICINE CLINIC | Facility: CLINIC | Age: 48
End: 2018-11-20

## 2018-11-20 DIAGNOSIS — Z23 NEED FOR PNEUMOCOCCAL VACCINATION: Primary | ICD-10-CM

## 2018-11-21 ENCOUNTER — CLINICAL SUPPORT (OUTPATIENT)
Dept: FAMILY MEDICINE CLINIC | Facility: CLINIC | Age: 48
End: 2018-11-21
Payer: COMMERCIAL

## 2018-11-21 DIAGNOSIS — Z23 NEED FOR VACCINATION AGAINST STREPTOCOCCUS PNEUMONIAE USING PNEUMOCOCCAL CONJUGATE VACCINE 13: Primary | ICD-10-CM

## 2018-11-21 PROCEDURE — 90471 IMMUNIZATION ADMIN: CPT

## 2018-11-21 PROCEDURE — 90670 PCV13 VACCINE IM: CPT

## 2018-12-07 DIAGNOSIS — Z30.41 ORAL CONTRACEPTIVE PILL SURVEILLANCE: ICD-10-CM

## 2018-12-07 RX ORDER — NORETHINDRONE AND ETHINYL ESTRADIOL 7 DAYS X 3
1 KIT ORAL DAILY
Qty: 84 TABLET | Refills: 1 | Status: SHIPPED | OUTPATIENT
Start: 2018-12-07 | End: 2019-03-21 | Stop reason: SDUPTHER

## 2018-12-10 DIAGNOSIS — T78.40XD ALLERGIC STATE, SUBSEQUENT ENCOUNTER: ICD-10-CM

## 2018-12-10 DIAGNOSIS — E10.9 TYPE 1 DIABETES MELLITUS WITHOUT COMPLICATION (HCC): Primary | ICD-10-CM

## 2018-12-10 RX ORDER — EPINEPHRINE 0.3 MG/.3ML
0.3 INJECTION SUBCUTANEOUS ONCE
Qty: 1 EACH | Refills: 3 | Status: SHIPPED | OUTPATIENT
Start: 2018-12-10 | End: 2019-08-12 | Stop reason: SDUPTHER

## 2019-01-12 LAB
CREAT ?TM UR-SCNC: 30.6 UMOL/L
EXT MICROALBUMIN URINE RANDOM: 15.7
HBA1C MFR BLD HPLC: 9.1 %
MICROALBUMIN/CREAT UR: 51.3 MG/G{CREAT}

## 2019-01-23 DIAGNOSIS — Z12.31 ENCOUNTER FOR SCREENING MAMMOGRAM FOR BREAST CANCER: ICD-10-CM

## 2019-03-20 DIAGNOSIS — E78.01 FAMILIAL HYPERCHOLESTEROLEMIA: ICD-10-CM

## 2019-03-20 RX ORDER — ATORVASTATIN CALCIUM 10 MG/1
TABLET, FILM COATED ORAL DAILY
Qty: 90 TABLET | Refills: 1 | Status: SHIPPED | OUTPATIENT
Start: 2019-03-20 | End: 2019-08-12 | Stop reason: SDUPTHER

## 2019-03-21 DIAGNOSIS — Z30.41 ORAL CONTRACEPTIVE PILL SURVEILLANCE: ICD-10-CM

## 2019-03-21 RX ORDER — NORETHINDRONE AND ETHINYL ESTRADIOL 7 DAYS X 3
KIT ORAL
Qty: 84 TABLET | Refills: 1 | Status: SHIPPED | OUTPATIENT
Start: 2019-03-21 | End: 2019-08-12 | Stop reason: SDUPTHER

## 2019-03-28 ENCOUNTER — TELEPHONE (OUTPATIENT)
Dept: FAMILY MEDICINE CLINIC | Facility: CLINIC | Age: 49
End: 2019-03-28

## 2019-03-29 DIAGNOSIS — Z12.11 COLON CANCER SCREENING: Primary | ICD-10-CM

## 2019-03-29 NOTE — TELEPHONE ENCOUNTER
Clinical - Returned your call  She has an endocrinologist for her diabetes    Cannot take time off to come into the Dr Dr Lamonte Rich's insurance will cover the cologard

## 2019-04-01 NOTE — TELEPHONE ENCOUNTER
Candace Oscar called and she goes to [employer]  [phone]    Called the office to request Office notes, BW and Urine  Fax is not currently working, they are trying to get it fix

## 2019-06-10 ENCOUNTER — TELEPHONE (OUTPATIENT)
Dept: FAMILY MEDICINE CLINIC | Facility: CLINIC | Age: 49
End: 2019-06-10

## 2019-08-12 DIAGNOSIS — E78.01 FAMILIAL HYPERCHOLESTEROLEMIA: ICD-10-CM

## 2019-08-12 DIAGNOSIS — E10.9 TYPE 1 DIABETES MELLITUS WITHOUT COMPLICATION (HCC): ICD-10-CM

## 2019-08-12 DIAGNOSIS — Z30.41 ORAL CONTRACEPTIVE PILL SURVEILLANCE: ICD-10-CM

## 2019-08-12 DIAGNOSIS — T78.40XD ALLERGIC STATE, SUBSEQUENT ENCOUNTER: ICD-10-CM

## 2019-08-12 RX ORDER — EPINEPHRINE 0.3 MG/.3ML
INJECTION SUBCUTANEOUS
Qty: 2 EACH | Refills: 1 | Status: SHIPPED | OUTPATIENT
Start: 2019-08-12 | End: 2019-08-19 | Stop reason: SDUPTHER

## 2019-08-12 RX ORDER — ATORVASTATIN CALCIUM 10 MG/1
TABLET, FILM COATED ORAL DAILY
Qty: 90 TABLET | Refills: 1 | Status: SHIPPED | OUTPATIENT
Start: 2019-08-12 | End: 2020-02-19

## 2019-08-12 RX ORDER — IBUPROFEN 600 MG/1
TABLET ORAL
Qty: 1 KIT | Refills: 1 | Status: SHIPPED | OUTPATIENT
Start: 2019-08-12 | End: 2019-10-25 | Stop reason: SDUPTHER

## 2019-08-12 RX ORDER — NORETHINDRONE AND ETHINYL ESTRADIOL 7 DAYS X 3
KIT ORAL
Qty: 84 TABLET | Refills: 1 | Status: SHIPPED | OUTPATIENT
Start: 2019-08-12 | End: 2020-01-03

## 2019-08-19 DIAGNOSIS — T78.40XD ALLERGIC STATE, SUBSEQUENT ENCOUNTER: ICD-10-CM

## 2019-08-19 RX ORDER — EPINEPHRINE 0.3 MG/.3ML
INJECTION SUBCUTANEOUS
Qty: 4 EACH | Refills: 1 | Status: SHIPPED | OUTPATIENT
Start: 2019-08-19 | End: 2020-08-18

## 2019-08-27 ENCOUNTER — APPOINTMENT (OUTPATIENT)
Dept: RADIOLOGY | Facility: CLINIC | Age: 49
End: 2019-08-27
Payer: COMMERCIAL

## 2019-08-27 ENCOUNTER — OFFICE VISIT (OUTPATIENT)
Dept: OBGYN CLINIC | Facility: CLINIC | Age: 49
End: 2019-08-27
Payer: COMMERCIAL

## 2019-08-27 VITALS
HEART RATE: 93 BPM | WEIGHT: 182.6 LBS | DIASTOLIC BLOOD PRESSURE: 81 MMHG | SYSTOLIC BLOOD PRESSURE: 137 MMHG | BODY MASS INDEX: 34.48 KG/M2 | HEIGHT: 61 IN

## 2019-08-27 DIAGNOSIS — M76.822 POSTERIOR TIBIAL TENDINITIS, LEFT: Primary | ICD-10-CM

## 2019-08-27 DIAGNOSIS — M25.572 PAIN, JOINT, ANKLE AND FOOT, LEFT: ICD-10-CM

## 2019-08-27 PROCEDURE — 73630 X-RAY EXAM OF FOOT: CPT

## 2019-08-27 PROCEDURE — 99214 OFFICE O/P EST MOD 30 MIN: CPT | Performed by: ORTHOPAEDIC SURGERY

## 2019-08-27 NOTE — LETTER
August 27, 2019     Patient: Liset Fisher   YOB: 1970   Date of Visit: 8/27/2019       To Whom it May Concern:    Liset Fisher is under my professional care  She was seen in my office on 8/27/2019  She may wear comfortable sneakers for work due to left foot pain  If you have any questions or concerns, please don't hesitate to call           Sincerely,          Gage Whitney MD        CC: No Recipients

## 2019-08-27 NOTE — PROGRESS NOTES
Assessment/Plan:  1  Posterior tibial tendinitis, left  Ambulatory referral to Podiatry    Ambulatory referral to Physical Therapy   2  Pain, joint, ankle and foot, left  XR foot 3+ vw left       Scribe Attestation    I,:   Keeley Lopez am acting as a scribe while in the presence of the attending physician :        I,:   Tram Yadav MD personally performed the services described in this documentation    as scribed in my presence :              Yessy Hugo upon examination and review the x-ray left foot does demonstrate signs and symptoms consistent with posterior tibialis tendinitis  She does have weakness with strength testing the posterior tibialis tendon on exam today and is point tender over the muscular tract  She does appear to have normal sensation on exam today  However I did note to Yessy Hugo that her history of type 1 diabetes could result in symptoms associated with neuropathy  Her last A1c from January 2019 was 9 1  She does have an insulin pump and has been working hard to have her A1c under control however is having difficulty  I did note to her that tendon issues could be result to diabetes  I did also emphasize the importance to her of foot care as she is more susceptible to ulcers, and infections into the feet  With this in mind I do not recommend a corticosteroid injection into the area as well as refraining from the use of a Cam walker boot  I would like her to continue using comfortable sneakers  However I would like her to see podiatry to have a custom orthotic made for her  I did provide her a note for work that she may wear comfortable sneakers  My  will instruct her on proper execution of foot and ankle exercises to help strengthen the musculature of her foot and ankle  Should she feel that she is not getting adequate results with home exercises I did provided a referral to formal therapy and she may follow up with them for therapy should she feel the need to  Rickey Salguero may follow up with me on an as-needed basis  Subjective:   Juvenal Bernstein is a 44 y o  female who presents to the office today for initial evaluation of her left foot  She states that approximately 1-2 months ago she felt a pop sensation into her foot while she was sleeping and had intermittent and mild to moderate sharp pain about the dorsal and medial aspect of the foot  She states that the pain did resolve on its own  Unfortunately, she did have a recurrent pop over this past weekend, and notes that the pain is the persisting for a longer period of time  She notes that her pain is exacerbated with weight-bearing activities particularly during the toe off phase of the gait cycle  She remarks that there is no specific shoe that alleviates her painful symptoms  And notes that most of it is at the plantar aspect  She denies any specific swelling or redness into the foot or ankle  She is a type 1 diabetic her last A1c reading of 9 1 from January 2019  Today she denies any distal paresthesias  Review of Systems   Constitutional: Negative for chills, fever and unexpected weight change  HENT: Negative for hearing loss, nosebleeds and sore throat  Eyes: Negative for pain, redness and visual disturbance  Respiratory: Negative for cough, shortness of breath and wheezing  Cardiovascular: Negative for chest pain, palpitations and leg swelling  Gastrointestinal: Negative for abdominal pain, nausea and vomiting  Endocrine: Negative for polydipsia and polyuria  Genitourinary: Negative for dysuria and hematuria  Musculoskeletal: Positive for arthralgias  See HPI   Skin: Negative for rash and wound  Neurological: Negative for dizziness, numbness and headaches  Psychiatric/Behavioral: Negative for decreased concentration and suicidal ideas  The patient is not nervous/anxious            Past Medical History:   Diagnosis Date    Diabetes ([facility] [address] )     Disease of thyroid gland     Hyperlipidemia     last assessed 5/11/15       Past Surgical History:   Procedure Laterality Date    APPENDECTOMY      CARPAL TUNNEL RELEASE Bilateral     CYST REMOVAL      hand, tendon cyst    GANGLION CYST EXCISION      TONSILLECTOMY      WISDOM TOOTH EXTRACTION         Family History   Problem Relation Age of Onset    No Known Problems Mother     No Known Problems Father     Cancer Maternal Grandmother     No Known Problems Sister     No Known Problems Brother     No Known Problems Maternal Aunt     No Known Problems Maternal Uncle     No Known Problems Paternal Aunt     No Known Problems Paternal Uncle     No Known Problems Maternal Grandfather     No Known Problems Paternal Grandmother     No Known Problems Paternal Grandfather     Mental illness Neg Hx     Substance Abuse Neg Hx        Social History     Occupational History    Not on file   Tobacco Use    Smoking status: Former Smoker    Smokeless tobacco: Never Used   Substance and Sexual Activity    Alcohol use: No    Drug use: No    Sexual activity: Not on file         Current Outpatient Medications:     atorvastatin (LIPITOR) 10 mg tablet, TAKE 1 TABLET BY MOUTH  DAILY, Disp: 90 tablet, Rfl: 1    Blood Glucose Monitoring Suppl (FREESTYLE FREEDOM LITE) w/Device KIT, by Does not apply route, Disp: , Rfl:     EPINEPHrine (EPIPEN) 0 3 mg/0 3 mL SOAJ, INJECT AS NEEDED FOR  ALLERGIC RESPONSE AS  DIRECTED BY MD, Disp: 4 each, Rfl: 1    GLUCAGON EMERGENCY 1 MG injection, INJECT 1MG INTRAMUSCULARLY  ONCE FOR 1 DOSE, Disp: 1 kit, Rfl: 1    insulin lispro (HUMALOG) 100 units/mL injection, Inject under the skin, Disp: , Rfl:     levothyroxine (SYNTHROID) 112 mcg tablet, Take by mouth, Disp: , Rfl:     NORTREL 7/7/7 0 5/0 75/1-35 MG-MCG per tablet, TAKE 1 TABLET BY MOUTH  DAILY, Disp: 84 tablet, Rfl: 1    Allergies   Allergen Reactions    Penicillins     Sulfites        Objective:  Vitals:    08/27/19 0935   BP: 137/81   Pulse: 93 Left Ankle Exam     Tenderness   Left ankle tenderness location: posterior tibial tendon  Swelling: mild    Range of Motion   Dorsiflexion: 30   Plantar flexion: 50   Eversion: 30   Inversion: 50     Muscle Strength   Dorsiflexion:  5/5   Plantar flexion:  4/5   Anterior tibial:  5/5   Posterior tibial:  4/5    Tests   Anterior drawer: negative  Varus tilt: negative    Other   Erythema: absent  Scars: absent  Sensation: normal  Pulse: present    Comments: While weight-bearing the medial longitudinal arch is well maintained    No significant hindfoot valgus  Observations   Left Ankle/Foot   Negative for adhesive scar  Strength/Myotome Testing     Left Ankle/Foot   Dorsiflexion: 5  Plantar flexion: 4      Physical Exam   Constitutional: She is oriented to person, place, and time  She appears well-developed and well-nourished  HENT:   Head: Normocephalic and atraumatic  Eyes: Conjunctivae are normal  Right eye exhibits no discharge  Left eye exhibits no discharge  Neck: Normal range of motion  Neck supple  Cardiovascular: Normal rate and intact distal pulses  Pulmonary/Chest: Effort normal  No respiratory distress  Musculoskeletal:   As noted in HPI   Neurological: She is alert and oriented to person, place, and time  Skin: Skin is warm and dry  Psychiatric: She has a normal mood and affect  Her behavior is normal  Judgment and thought content normal    Vitals reviewed  I have personally reviewed pertinent films in PACS and my interpretation is as follows:    X-rays of the left foot demonstrates no acute fracture or other osseous abnormalities

## 2019-08-27 NOTE — PATIENT INSTRUCTIONS
Ankle Exercises   AMBULATORY CARE:   What you need to know about ankle exercises: Ankle exercises help strengthen your ankle and improve its function after injury  These are beginning exercises  Ask your healthcare provider if you need to see a physical therapist for more advanced exercises  · Do these exercises 3 to 5 days a week , or as directed by your healthcare provider  Ask if you should perform the exercises on each ankle  · Do the exercises in the order that your healthcare provider recommends  This will help prevent swelling, chronic pain, and reinjury  Start with range of motion exercises  Then progress to strengthening exercises, and finally to balancing exercises  · Warm up before you do ankle exercises  Walk or ride a stationary bike for 5 to 10 minutes to prepare your ankle for movement  · Stop if you feel pain  It is normal to feel some discomfort at first  Regular exercise will help decrease your discomfort over time  How to perform range of motion exercises safely:  Begin with range of motion exercises to improve flexibility  Ask your healthcare provider when you can progress to strengthening exercises  · Ankle alphabet:  Sit on a chair so that your feet do not touch the floor  Use your big toe to write each letter of the alphabet  Use only your foot and ankle, and keep your movements small  Do 2 sets  · Calf stretches:      ¨ Sitting calf stretches with a towel:  Sit on the floor with both legs out straight in front of you  Loop a towel around the ball of your injured foot  Grasp the ends of the towel and pull it toward you  Keep your leg and back straight  Do not lean forward as you pull the towel  Hold for 30 seconds  Then relax for 30 seconds  Do 2 sets of 10  ¨ Standing calf stretches:  Stand facing a wall with the foot that is not injured forward and your knee slightly bent   Keep the leg with the injured foot straight and behind you with your toes pointed in slightly  With both heels flat on the floor, press your hips forward  Do not arch your back  Hold for 30 seconds, and then relax for 30 seconds  Do 2 sets of 10  Repeat with your leg bent  Do 2 sets of 10  How to perform strengthening exercises safely:  After you can perform range of motion exercises without pain, you may begin strengthening exercises  Ask your healthcare provider when you can progress to balancing exercises  · Ankle movement in 4 directions:  Sit on the floor with your legs straight in front of you  Keep your heels on the floor for support  ¨ Dorsiflexion:  Begin with your toes pointing straight up  Pull your toes toward your body  Slowly return to the starting position  Do 3 sets of 5      ¨ Plantar flexion:  Begin with your toes pointing straight up  Push your toes away from your body  Slowly return to the starting position  Do 3 sets of 5            ¨ Inversion:  Begin with your toes pointing straight up  Push your toes inward, toward each other  Slowly return to the starting position  Do 3 sets of 5      ¨ Eversion:  Begin with your toes pointing straight up  Push your toes outward, away from each other  Slowly return to the starting position  Do 3 sets of 5          · Toe curls with a towel:  Sit on a chair so that both of your feet are flat on the floor  Place a small towel on the floor in front of your injured foot  Grab the center of the towel with your toes and curl the towel toward you  Relax and repeat  Do 1 set of 5            · [place] pick-ups:  Sit on a chair so that both of your feet are flat on the floor  Place 20 marbles on the floor in front of your injured foot  Use your toes to  one marble at a time and place it into a bowl  Repeat until you have picked up all the marbles  Do 1 set  · Heel raises:      ¨ Single leg heel raises:  Stand with your weight evenly on both feet  Hold on to a chair or a wall for balance   Lift the foot that is not injured off the floor so all your weight is placed on your injured foot  Raise the heel of your injured foot as high as you can  Slowly lower your heel to the floor  Do 1 set of 10  ¨ Double leg heel raises:  Stand with your weight evenly on both feet  Hold on to a chair or a wall for balance  Raise both of your heels as high as you can  Slowly lower your heels to the floor  Do 1 set of 10  · Heel and toe walks:      ¨ Heel walks:  Begin in a standing position  Lift your toes off the floor and walk on your heels  Keep your toes lifted as high as possible  Do 2 sets of 10  ¨ Toe walks:  Begin in a standing position  Lift your heels off the floor and walk on the balls and toes of your feet  Keep your heels lifted as high as possible  Do 2 sets of 10  How to perform a balance exercise safely:  After you can perform strengthening exercises without pain, you may do this beginning balancing exercise  Ask your healthcare provider for more advanced balance exercises  · Single leg stance:  Stand with your weight evenly on both feet, or hold on to a chair or a wall  Do not lean to the side  Lift the foot that is not injured off the floor so all your weight is placed on your injured foot  Balance on your injured foot  Ask your healthcare provider how long to hold this position  Contact your healthcare provider if:   · Your pain becomes worse  · You have new pain  · You have questions or concerns about your condition, care, or exercise program   © 2017 [address] [employer] Information is for End User's use only and may not be sold, redistributed or otherwise used for commercial purposes  All illustrations and images included in CareNotes® are the copyrighted property of [employer] , [employer]  or Russ Wade  The above information is an  only  It is not intended as medical advice for individual conditions or treatments   Talk to your doctor, nurse or pharmacist before following any medical regimen to see if it is safe and effective for you

## 2019-09-26 ENCOUNTER — OFFICE VISIT (OUTPATIENT)
Dept: PODIATRY | Facility: CLINIC | Age: 49
End: 2019-09-26
Payer: COMMERCIAL

## 2019-09-26 VITALS
SYSTOLIC BLOOD PRESSURE: 132 MMHG | RESPIRATION RATE: 17 BRPM | DIASTOLIC BLOOD PRESSURE: 84 MMHG | BODY MASS INDEX: 34.36 KG/M2 | HEIGHT: 61 IN | WEIGHT: 182 LBS

## 2019-09-26 DIAGNOSIS — Q66.52 CONGENITAL PES PLANUS OF LEFT FOOT: ICD-10-CM

## 2019-09-26 DIAGNOSIS — M79.671 PAIN IN BOTH FEET: ICD-10-CM

## 2019-09-26 DIAGNOSIS — M21.969 ACQUIRED DEFORMITY OF FOOT, UNSPECIFIED LATERALITY: Primary | ICD-10-CM

## 2019-09-26 DIAGNOSIS — M79.672 PAIN IN BOTH FEET: ICD-10-CM

## 2019-09-26 DIAGNOSIS — Q66.51 CONGENITAL PES PLANUS OF RIGHT FOOT: ICD-10-CM

## 2019-09-26 PROCEDURE — 99203 OFFICE O/P NEW LOW 30 MIN: CPT | Performed by: PODIATRIST

## 2019-09-26 PROCEDURE — L3000 FT INSERT UCB BERKELEY SHELL: HCPCS | Performed by: PODIATRIST

## 2019-09-26 NOTE — PROGRESS NOTES
Assessment/Plan:  Deformity of foot bilateral   Osteoarthritis  Pes planus  Plan  Foot exam performed  X-rays reviewed  Patient has osteoarthritis of the instep  We will control her abnormal pronation with foot orthotics  Her feet were casted for these today  No problem-specific Assessment & Plan notes found for this encounter  Diagnoses and all orders for this visit:    Acquired deformity of foot, unspecified laterality    Pain in both feet    Congenital pes planus of right foot    Congenital pes planus of left foot          Subjective:  Patient has pain in the instep  Left greater than right  This has been ongoing for months  It is occasional and intermittent  No history of trauma  Patient is concerned it is related to diabetes      Past Medical History:   Diagnosis Date    Diabetes ([facility] [address] [facility]     Disease of thyroid gland     Hyperlipidemia     last assessed 5/11/15       Past Surgical History:   Procedure Laterality Date    APPENDECTOMY      CARPAL TUNNEL RELEASE Bilateral     CYST REMOVAL      hand, tendon cyst    GANGLION CYST EXCISION      TONSILLECTOMY      WISDOM TOOTH EXTRACTION         Allergies   Allergen Reactions    Penicillins     Sulfites          Current Outpatient Medications:     atorvastatin (LIPITOR) 10 mg tablet, TAKE 1 TABLET BY MOUTH  DAILY, Disp: 90 tablet, Rfl: 1    Blood Glucose Monitoring Suppl (FREESTYLE FREEDOM LITE) w/Device KIT, by Does not apply route, Disp: , Rfl:     EPINEPHrine (EPIPEN) 0 3 mg/0 3 mL SOAJ, INJECT AS NEEDED FOR  ALLERGIC RESPONSE AS  DIRECTED BY MD, Disp: 4 each, Rfl: 1    GLUCAGON EMERGENCY 1 MG injection, INJECT 1MG INTRAMUSCULARLY  ONCE FOR 1 DOSE, Disp: 1 kit, Rfl: 1    insulin lispro (HUMALOG) 100 units/mL injection, Inject under the skin, Disp: , Rfl:     levothyroxine (SYNTHROID) 112 mcg tablet, Take by mouth, Disp: , Rfl:     NORTREL 7/7/7 0 5/0 75/1-35 MG-MCG per tablet, TAKE 1 TABLET BY MOUTH  DAILY, Disp: 84 tablet, Rfl: 1    Patient Active Problem List   Diagnosis    Type 1 diabetes mellitus without complication ([facility] [address] )    Hypothyroidism    Hyperlipidemia    Routine general medical examination at a health care facility    Screening for hypertension    Encounter for screening mammogram for breast cancer    Colon cancer screening    Need for tetanus booster    Acquired deformity of foot    Pain in both feet    Congenital pes planus of right foot    Congenital pes planus of left foot          Patient ID: Caitlin Jimenez is a 44 y o  female  HPI    The following portions of the patient's history were reviewed and updated as appropriate:     family history includes Cancer in her maternal grandmother; No Known Problems in her brother, father, maternal aunt, maternal grandfather, maternal uncle, mother, paternal aunt, paternal grandfather, paternal grandmother, paternal uncle, and sister  reports that she has quit smoking  She has never used smokeless tobacco  She reports that she does not drink alcohol or use drugs  Vitals:    09/26/19 1714   BP: 132/84   Resp: 17       Review of Systems      Objective:  Patient's shoes and socks removed     Foot Exam    General  General Appearance: appears stated age and healthy   Orientation: alert and oriented to person, place, and time   Affect: appropriate   Gait: antalgic       Right Foot/Ankle     Inspection and Palpation  Tenderness: bony tenderness   Swelling: dorsum and metatarsals   Arch: pes planus  Hammertoes: fifth toe  Hallux valgus: no  Hallux limitus: yes    Neurovascular  Dorsalis pedis: 3+  Posterior tibial: 3+  Saphenous nerve sensation: normal  Tibial nerve sensation: normal  Superficial peroneal nerve sensation: normal  Deep peroneal nerve sensation: normal  Sural nerve sensation: normal      Left Foot/Ankle      Inspection and Palpation  Tenderness: bony tenderness   Swelling: dorsum and metatarsals   Arch: pes planus  Hammertoes: fifth toe  Hallux valgus: no  Hallux limitus: yes    Neurovascular  Dorsalis pedis: 3+  Posterior tibial: 3+  Saphenous nerve sensation: normal  Tibial nerve sensation: normal  Superficial peroneal nerve sensation: normal  Deep peroneal nerve sensation: normal  Sural nerve sensation: normal        Physical Exam   Constitutional: She is oriented to person, place, and time  She appears well-developed and well-nourished  Cardiovascular: Normal rate and regular rhythm  Pulses:       Dorsalis pedis pulses are 3+ on the right side, and 3+ on the left side  Posterior tibial pulses are 3+ on the right side, and 3+ on the left side  Musculoskeletal:        Right foot: There is bony tenderness  Left foot: There is bony tenderness  Patient is pronated in stance and gait  Lisfranc joint bilateral demonstrates dorsal exostosis consistent with osteoarthritis  This does not appear to be consistent with Charcot joint   Neurological: She is alert and oriented to person, place, and time  Skin: Skin is warm and dry  Capillary refill takes less than 2 seconds  Psychiatric: She has a normal mood and affect  Her behavior is normal  Judgment and thought content normal    Vitals reviewed          Procedures

## 2019-10-12 LAB — HBA1C MFR BLD HPLC: 8.6 %

## 2019-10-23 ENCOUNTER — TELEPHONE (OUTPATIENT)
Dept: FAMILY MEDICINE CLINIC | Facility: CLINIC | Age: 49
End: 2019-10-23

## 2019-10-23 NOTE — TELEPHONE ENCOUNTER
I LOVE DR Abigail Rodriguez IN [place]    IF SHE WANTS I WILL PUT A REFERRAL IN FOR HER    THANKS
LMOM informing her about the message  If she wants the referral she will call the office back      A S  (student)
Link Fredy called back  She does not need a referral with her insurance  She is just going to call him and make an appointment      A S (student)
Would like a recommendation on a Hematologist in the [address] [place] [address] network due to her white count being high  Please advise      A S  (student)
No

## 2019-10-24 ENCOUNTER — TELEPHONE (OUTPATIENT)
Dept: HEMATOLOGY ONCOLOGY | Facility: CLINIC | Age: 49
End: 2019-10-24

## 2019-10-24 NOTE — TELEPHONE ENCOUNTER
New Patient Encounter    New Patient Intake Form   Patient Details:  Ezequiel Hanna  1970  [id number]    Background Information:  [address] starts by opening a telephone encounter and gathering the following information   Who is calling to schedule? If not self, relationship to patient? self   Referring Provider Taylor Lee   What is the diagnosis? Elevated WBC and PLT   When was the diagnosis? 10/2019   Is patient aware of diagnosis? Yes   Reason for visit? NP DX   Have you had any testing done? If so: when, where? Yes,[facility] , in [place]   Are records in EPIC? yes   Was the patient told to bring a disk? no   Scheduling Information:   Preferred [place]:  Ian Cristina     Requesting Specific Provider? Gregg Liriano   Are there any dates/time the patient cannot be seen? no   Counseling Pre-Screen:  If the patient answers YES to any of the below questions, please route to the appropriate location specific counselor    Have you felt anxious or worried about cancer and the treatment you are receiving? No   Has your diagnosis caused physical, emotional, or financial hardship for you? No   Note: Do not ask the patient about transportation issues/needs  Please notate if the patient brings it up and the counselor will schedule accordingly  Miscellaneous: NA   After completing the above information, please route to Financial Counselor and the appropriate Nurse Navigator for review

## 2019-10-25 DIAGNOSIS — E10.9 TYPE 1 DIABETES MELLITUS WITHOUT COMPLICATION (HCC): ICD-10-CM

## 2019-10-25 RX ORDER — IBUPROFEN 600 MG/1
TABLET ORAL
Qty: 1 KIT | Refills: 3 | Status: SHIPPED | OUTPATIENT
Start: 2019-10-25 | End: 2021-03-04 | Stop reason: ALTCHOICE

## 2019-11-21 ENCOUNTER — TELEPHONE (OUTPATIENT)
Dept: FAMILY MEDICINE CLINIC | Facility: CLINIC | Age: 49
End: 2019-11-21

## 2019-11-21 NOTE — TELEPHONE ENCOUNTER
States that she has a sinus infection  I told her that she needs to be seen and she wanted me to ask anyway    As they all say - He's known me forever!!!    Send to [facility] in [place]

## 2019-12-02 ENCOUNTER — CONSULT (OUTPATIENT)
Dept: HEMATOLOGY ONCOLOGY | Facility: MEDICAL CENTER | Age: 49
End: 2019-12-02
Payer: COMMERCIAL

## 2019-12-02 VITALS
OXYGEN SATURATION: 98 % | RESPIRATION RATE: 16 BRPM | TEMPERATURE: 97.1 F | HEIGHT: 61 IN | HEART RATE: 92 BPM | BODY MASS INDEX: 35.3 KG/M2 | WEIGHT: 187 LBS | DIASTOLIC BLOOD PRESSURE: 84 MMHG | SYSTOLIC BLOOD PRESSURE: 132 MMHG

## 2019-12-02 DIAGNOSIS — D72.829 LEUKOCYTOSIS, UNSPECIFIED TYPE: Primary | ICD-10-CM

## 2019-12-02 PROCEDURE — 99244 OFF/OP CNSLTJ NEW/EST MOD 40: CPT | Performed by: INTERNAL MEDICINE

## 2019-12-02 NOTE — PROGRESS NOTES
[facility]  1970  [facility] [address] [address]  HEMATOLOGY/ONCOLOGY CONSULTATION REPORT    DISCUSSION/SUMMARY:    13-year-old female with a slightly elevated white count and slightly elevated platelet count  Mrs Chaves feels well and clinically there are no troubling hematology signs  Patient states having an elevated white count for at least 20 years (without progression)  The abnormalities are subtle and a hematology workup at this time would likely provide false negative results  We discussed options  We discussed what to monitor for in regard to weight loss, persistent fevers, chills, night sweats, weakness/fatigue, pallor etc   The plan is to continue with surveillance  Mrs Chaves and Dr Brown Marie can periodically check the CBC/differential looking for worsening trends  Mrs Chaves demonstrated a very good understanding of the situation and knows that there may be a brewing primary bone marrow disorder that will eventually need attention  Return to Hematology at this time is on a prn basis but Mrs Chaves knows to call the hematology/oncology office if there are any other questions or concerns  Carefully review your medication list and verify that the list is accurate and up-to-date  Please call the hematology/oncology office if there are medications missing from the list, medications on the list that you are not currently taking or if there is a dosage or instruction that is different from how you're taking that medication      Patient goals and areas of care:  Monitor CBC parameters  Barriers to care:  None  Patient is able to self-care   ______________________________________________________________________________________    Chief Complaint   Patient presents with    Consult     Leukocytosis, thrombocytosis     History of Present Illness:  13-year-old female referred for leukocytosis and thrombocytosis  Mrs Chaves states that she has had elevated white counts going back at least to the 1990s  The elevated platelet counts are newer  Patient was previously worked up by 1 of the [employer] many years ago - no abnormality was found  Patient is never undergone a bone marrow biopsy  Patient states feeling well, baseline  Appetite is good, weight is stable  Patient continues to be very active working full-time  No fevers, chills or sweats  No headaches, blurred vision or dizziness  No shortness of breath or dyspnea on exertion  No chest pain or pressure  No other GI,  or gyn issues  Review of Systems   Constitutional: Negative  HENT: Negative  Eyes: Negative  Respiratory: Negative  Cardiovascular: Negative  Gastrointestinal: Negative  Endocrine: Negative  Genitourinary: Negative  Musculoskeletal: Negative  Skin: Negative  Allergic/Immunologic: Negative  Neurological: Negative  Hematological: Negative  Psychiatric/Behavioral: Negative  All other systems reviewed and are negative      Patient Active Problem List   Diagnosis    Type 1 diabetes mellitus without complication ([facility] [address]    Hypothyroidism    Hyperlipidemia    Routine general medical examination at a health care facility    Screening for hypertension    Encounter for screening mammogram for breast cancer    Colon cancer screening    Need for tetanus booster    Acquired deformity of foot    Pain in both feet    Congenital pes planus of right foot    Congenital pes planus of left foot     Past Medical History:   Diagnosis Date    Diabetes ([facility] [address] [facility]     Disease of thyroid gland     Hyperlipidemia     last assessed 5/11/15     Past Surgical History:   Procedure Laterality Date    APPENDECTOMY      CARPAL TUNNEL RELEASE Bilateral     CYST REMOVAL      hand, tendon cyst    GANGLION CYST EXCISION      TONSILLECTOMY      WISDOM TOOTH EXTRACTION       OBGYN: Routine mammogram within normal limits, no active gyn issues    Family History   Problem Relation Age of Onset    No Known Problems Mother     Hypertension Father     Cancer Maternal Grandmother     Liver cancer Maternal Grandmother     No Known Problems Sister     No Known Problems Brother     No Known Problems Maternal Aunt     No Known Problems Maternal Uncle     No Known Problems Paternal Aunt     No Known Problems Paternal Uncle     No Known Problems Maternal Grandfather     No Known Problems Paternal Grandmother     Diabetes Paternal Grandfather     Mental illness Neg Hx     Substance Abuse Neg Hx     Family history:  No known familial or genetic diseases including CBC abnormalities, no children    Social History     Socioeconomic History    Marital status: Single     Spouse name: Not on file    Number of children: Not on file    Years of education: Not on file    Highest education level: Not on file   Occupational History    Not on file   Social Needs    Financial resource strain: Not on file    Food insecurity:     Worry: Not on file     Inability: Not on file    Transportation needs:     Medical: Not on file     Non-medical: Not on file   Tobacco Use    Smoking status: Former Smoker     Start date: 3/29/2013    Smokeless tobacco: Never Used   Substance and Sexual Activity    Alcohol use: No    Drug use: No    Sexual activity: Not on file   Lifestyle    Physical activity:     Days per week: Not on file     Minutes per session: Not on file    Stress: Not on file   Relationships    Social connections:     Talks on phone: Not on file     Gets together: Not on file     Attends Restoration service: Not on file     Active member of club or organization: Not on file     Attends meetings of clubs or organizations: Not on file     Relationship status: Not on file    Intimate partner violence:     Fear of current or ex partner: Not on file     Emotionally abused: Not on file Physically abused: Not on file     Forced sexual activity: Not on file   Other Topics Concern    Not on file   Social History Narrative    Daily caffeine consumption;2-3 serving a day    Dental care occasionally    Social history:  No tobacco, alcohol or drug abuse, no toxic exposure    Current Outpatient Medications:     atorvastatin (LIPITOR) 10 mg tablet, TAKE 1 TABLET BY MOUTH  DAILY, Disp: 90 tablet, Rfl: 1    Blood Glucose Monitoring Suppl (FREESTYLE FREEDOM LITE) w/Device KIT, by Does not apply route, Disp: , Rfl:     Cholecalciferol (VITAMIN D3 PO), Take 1,000 Int'l Units by mouth, Disp: , Rfl:     EPINEPHrine (EPIPEN) 0 3 mg/0 3 mL SOAJ, INJECT AS NEEDED FOR  ALLERGIC RESPONSE AS  DIRECTED BY MD, Disp: 4 each, Rfl: 1    GLUCAGON EMERGENCY 1 MG injection, INJECT 1MG INTRAMUSCULARLY  ONCE FOR 1 DOSE AS DIRECTED, Disp: 1 kit, Rfl: 3    insulin lispro (HUMALOG) 100 units/mL injection, Inject under the skin, Disp: , Rfl:     levothyroxine (SYNTHROID) 112 mcg tablet, Take by mouth, Disp: , Rfl:     Multiple Vitamins-Minerals (CENTRUM SILVER 50+WOMEN PO), Take by mouth, Disp: , Rfl:     NORTREL 7/7/7 0 5/0 75/1-35 MG-MCG per tablet, TAKE 1 TABLET BY MOUTH  DAILY, Disp: 84 tablet, Rfl: 1    Allergies   Allergen Reactions    Penicillins     Sulfites        Vitals:    12/02/19 0855   BP: 132/84   Pulse: 92   Resp: 16   Temp: (!) 97 1 °F (36 2 °C)   SpO2: 98%     Physical Exam   Constitutional: She is oriented to person, place, and time  She appears well-developed and well-nourished  Well-nourished female, no respiratory distress   HENT:   Head: Normocephalic and atraumatic  Right Ear: External ear normal    Left Ear: External ear normal    Mouth/Throat: Oropharynx is clear and moist    Eyes: Pupils are equal, round, and reactive to light  Conjunctivae and EOM are normal    Neck: Normal range of motion  Neck supple     Cardiovascular: Normal rate, regular rhythm, normal heart sounds and intact distal pulses  Pulmonary/Chest: Effort normal and breath sounds normal    Abdominal: Soft  Bowel sounds are normal    Soft, nontender, obese cannot palpate liver or spleen, +bowel sounds   Musculoskeletal: Normal range of motion  Good range of motion in all 4 extremities   Neurological: She is alert and oriented to person, place, and time  She has normal reflexes  Skin: Skin is warm  Good color, warm, moist, no petechiae or ecchymoses   Psychiatric: She has a normal mood and affect  Her behavior is normal  Judgment and thought content normal    Extremities:  No lower extremity edema bilaterally, no cords, pulses are 1+  Lymphatics:  No adenopathy in the neck, supraclavicular region, axilla and groin bilaterally    Labs    10/13/2019 WBC = 11 2 hemoglobin = 12 6 hematocrit = 39 MCV = 92 platelet = 648 BUN = 11 creatinine = 0 70 calcium = 9 6    05/23/2016 WBC = 14 6 hemoglobin = 12 3 hematocrit = 37 3 MCV = 94 platelet = 442    Imaging    01/19/2019 Mammogram screening bilateral with CAD:  No evidence of malignancy, 1 year follow-up

## 2020-01-03 DIAGNOSIS — Z30.41 ORAL CONTRACEPTIVE PILL SURVEILLANCE: ICD-10-CM

## 2020-01-03 RX ORDER — NORETHINDRONE AND ETHINYL ESTRADIOL 7 DAYS X 3
KIT ORAL
Qty: 84 TABLET | Refills: 0 | Status: SHIPPED | OUTPATIENT
Start: 2020-01-03 | End: 2020-04-08

## 2020-02-19 DIAGNOSIS — E78.01 FAMILIAL HYPERCHOLESTEROLEMIA: ICD-10-CM

## 2020-02-19 RX ORDER — ATORVASTATIN CALCIUM 10 MG/1
TABLET, FILM COATED ORAL DAILY
Qty: 90 TABLET | Refills: 1 | Status: SHIPPED | OUTPATIENT
Start: 2020-02-19 | End: 2020-07-07

## 2020-04-08 DIAGNOSIS — Z30.41 ORAL CONTRACEPTIVE PILL SURVEILLANCE: ICD-10-CM

## 2020-04-08 RX ORDER — NORETHINDRONE AND ETHINYL ESTRADIOL 7 DAYS X 3
KIT ORAL
Qty: 84 TABLET | Refills: 0 | Status: SHIPPED | OUTPATIENT
Start: 2020-04-08 | End: 2020-06-23

## 2020-05-03 ENCOUNTER — OFFICE VISIT (OUTPATIENT)
Dept: URGENT CARE | Facility: CLINIC | Age: 50
End: 2020-05-03
Payer: COMMERCIAL

## 2020-05-03 ENCOUNTER — APPOINTMENT (OUTPATIENT)
Dept: RADIOLOGY | Facility: CLINIC | Age: 50
End: 2020-05-03
Payer: COMMERCIAL

## 2020-05-03 VITALS
SYSTOLIC BLOOD PRESSURE: 189 MMHG | HEART RATE: 98 BPM | BODY MASS INDEX: 35.65 KG/M2 | RESPIRATION RATE: 18 BRPM | HEIGHT: 61 IN | WEIGHT: 188.8 LBS | TEMPERATURE: 98.5 F | OXYGEN SATURATION: 100 % | DIASTOLIC BLOOD PRESSURE: 96 MMHG

## 2020-05-03 DIAGNOSIS — S69.91XA INJURY OF RIGHT WRIST, INITIAL ENCOUNTER: ICD-10-CM

## 2020-05-03 DIAGNOSIS — S69.91XA INJURY OF RIGHT WRIST, INITIAL ENCOUNTER: Primary | ICD-10-CM

## 2020-05-03 PROCEDURE — 99213 OFFICE O/P EST LOW 20 MIN: CPT | Performed by: NURSE PRACTITIONER

## 2020-05-03 PROCEDURE — 73110 X-RAY EXAM OF WRIST: CPT

## 2020-05-03 PROCEDURE — 1036F TOBACCO NON-USER: CPT | Performed by: NURSE PRACTITIONER

## 2020-05-03 RX ORDER — GLUCAGON 3 MG/1
POWDER NASAL
COMMUNITY
Start: 2020-04-28 | End: 2022-05-09 | Stop reason: SDUPTHER

## 2020-05-03 RX ORDER — LEVOTHYROXINE SODIUM 0.12 MG/1
TABLET ORAL
COMMUNITY
Start: 2020-03-23 | End: 2022-03-24 | Stop reason: DRUGHIGH

## 2020-05-04 ENCOUNTER — OFFICE VISIT (OUTPATIENT)
Dept: OBGYN CLINIC | Facility: CLINIC | Age: 50
End: 2020-05-04
Payer: COMMERCIAL

## 2020-05-04 VITALS
HEART RATE: 96 BPM | WEIGHT: 188.4 LBS | DIASTOLIC BLOOD PRESSURE: 90 MMHG | HEIGHT: 61 IN | SYSTOLIC BLOOD PRESSURE: 145 MMHG | BODY MASS INDEX: 35.57 KG/M2

## 2020-05-04 DIAGNOSIS — S69.91XA INJURY OF RIGHT WRIST, INITIAL ENCOUNTER: ICD-10-CM

## 2020-05-04 DIAGNOSIS — M18.11 ARTHRITIS OF CARPOMETACARPAL (CMC) JOINT OF RIGHT THUMB: Primary | ICD-10-CM

## 2020-05-04 PROCEDURE — 99213 OFFICE O/P EST LOW 20 MIN: CPT | Performed by: ORTHOPAEDIC SURGERY

## 2020-05-04 PROCEDURE — 1036F TOBACCO NON-USER: CPT | Performed by: ORTHOPAEDIC SURGERY

## 2020-05-04 PROCEDURE — 3008F BODY MASS INDEX DOCD: CPT | Performed by: ORTHOPAEDIC SURGERY

## 2020-05-07 ENCOUNTER — TELEPHONE (OUTPATIENT)
Dept: FAMILY MEDICINE CLINIC | Facility: CLINIC | Age: 50
End: 2020-05-07

## 2020-05-19 ENCOUNTER — APPOINTMENT (OUTPATIENT)
Dept: RADIOLOGY | Facility: CLINIC | Age: 50
End: 2020-05-19
Payer: COMMERCIAL

## 2020-05-19 ENCOUNTER — OFFICE VISIT (OUTPATIENT)
Dept: OBGYN CLINIC | Facility: CLINIC | Age: 50
End: 2020-05-19
Payer: COMMERCIAL

## 2020-05-19 VITALS
BODY MASS INDEX: 35.5 KG/M2 | WEIGHT: 188 LBS | HEIGHT: 61 IN | SYSTOLIC BLOOD PRESSURE: 144 MMHG | DIASTOLIC BLOOD PRESSURE: 88 MMHG | HEART RATE: 97 BPM

## 2020-05-19 DIAGNOSIS — S69.91XA INJURY OF RIGHT WRIST, INITIAL ENCOUNTER: ICD-10-CM

## 2020-05-19 DIAGNOSIS — M18.11 ARTHRITIS OF CARPOMETACARPAL (CMC) JOINT OF RIGHT THUMB: ICD-10-CM

## 2020-05-19 DIAGNOSIS — M18.12 ARTHRITIS OF CARPOMETACARPAL (CMC) JOINT OF LEFT THUMB: ICD-10-CM

## 2020-05-19 DIAGNOSIS — S69.91XD INJURY OF RIGHT WRIST, SUBSEQUENT ENCOUNTER: Primary | ICD-10-CM

## 2020-05-19 PROCEDURE — 73100 X-RAY EXAM OF WRIST: CPT

## 2020-05-19 PROCEDURE — 1036F TOBACCO NON-USER: CPT | Performed by: ORTHOPAEDIC SURGERY

## 2020-05-19 PROCEDURE — 99213 OFFICE O/P EST LOW 20 MIN: CPT | Performed by: ORTHOPAEDIC SURGERY

## 2020-05-19 PROCEDURE — 3008F BODY MASS INDEX DOCD: CPT | Performed by: ORTHOPAEDIC SURGERY

## 2020-06-23 DIAGNOSIS — Z30.41 ORAL CONTRACEPTIVE PILL SURVEILLANCE: ICD-10-CM

## 2020-06-23 RX ORDER — NORETHINDRONE AND ETHINYL ESTRADIOL 7 DAYS X 3
KIT ORAL
Qty: 84 TABLET | Refills: 0 | Status: SHIPPED | OUTPATIENT
Start: 2020-06-23 | End: 2020-08-18

## 2020-07-07 DIAGNOSIS — E78.01 FAMILIAL HYPERCHOLESTEROLEMIA: ICD-10-CM

## 2020-07-07 RX ORDER — ATORVASTATIN CALCIUM 10 MG/1
TABLET, FILM COATED ORAL DAILY
Qty: 90 TABLET | Refills: 1 | Status: SHIPPED | OUTPATIENT
Start: 2020-07-07 | End: 2021-01-29

## 2020-08-06 LAB
LEFT EYE DIABETIC RETINOPATHY: NORMAL
RIGHT EYE DIABETIC RETINOPATHY: NORMAL

## 2020-08-18 DIAGNOSIS — T78.40XD ALLERGIC STATE, SUBSEQUENT ENCOUNTER: ICD-10-CM

## 2020-08-18 DIAGNOSIS — Z30.41 ORAL CONTRACEPTIVE PILL SURVEILLANCE: ICD-10-CM

## 2020-08-18 RX ORDER — NORETHINDRONE AND ETHINYL ESTRADIOL 7 DAYS X 3
KIT ORAL
Qty: 84 TABLET | Refills: 3 | Status: SHIPPED | OUTPATIENT
Start: 2020-08-18 | End: 2021-06-22

## 2020-08-18 RX ORDER — EPINEPHRINE 0.3 MG/.3ML
INJECTION SUBCUTANEOUS
Qty: 4 EACH | Refills: 1 | Status: SHIPPED | OUTPATIENT
Start: 2020-08-18 | End: 2020-12-08

## 2020-11-12 ENCOUNTER — TELEMEDICINE (OUTPATIENT)
Dept: FAMILY MEDICINE CLINIC | Facility: CLINIC | Age: 50
End: 2020-11-12
Payer: COMMERCIAL

## 2020-11-12 VITALS — BODY MASS INDEX: 35.5 KG/M2 | TEMPERATURE: 97.2 F | HEIGHT: 61 IN | WEIGHT: 188 LBS

## 2020-11-12 DIAGNOSIS — Z20.828 SARS-ASSOCIATED CORONAVIRUS EXPOSURE: Primary | ICD-10-CM

## 2020-11-12 PROCEDURE — 99213 OFFICE O/P EST LOW 20 MIN: CPT | Performed by: FAMILY MEDICINE

## 2020-11-12 PROCEDURE — 3008F BODY MASS INDEX DOCD: CPT | Performed by: FAMILY MEDICINE

## 2020-11-12 PROCEDURE — 3725F SCREEN DEPRESSION PERFORMED: CPT | Performed by: FAMILY MEDICINE

## 2020-11-12 RX ORDER — LANCETS
EACH MISCELLANEOUS
COMMUNITY
Start: 2020-09-14

## 2020-11-12 RX ORDER — GLUCOSE SENSOR,IMPLANT/DEXAMET
EACH SUBCUTANEOUS
COMMUNITY
Start: 2020-08-24 | End: 2021-03-04 | Stop reason: ALTCHOICE

## 2020-11-13 ENCOUNTER — TELEPHONE (OUTPATIENT)
Dept: ADMINISTRATIVE | Facility: OTHER | Age: 50
End: 2020-11-13

## 2020-11-14 DIAGNOSIS — Z20.822 EXPOSURE TO COVID-19 VIRUS: Primary | ICD-10-CM

## 2020-11-14 DIAGNOSIS — Z20.822 EXPOSURE TO COVID-19 VIRUS: ICD-10-CM

## 2020-11-14 PROCEDURE — U0003 INFECTIOUS AGENT DETECTION BY NUCLEIC ACID (DNA OR RNA); SEVERE ACUTE RESPIRATORY SYNDROME CORONAVIRUS 2 (SARS-COV-2) (CORONAVIRUS DISEASE [COVID-19]), AMPLIFIED PROBE TECHNIQUE, MAKING USE OF HIGH THROUGHPUT TECHNOLOGIES AS DESCRIBED BY CMS-2020-01-R: HCPCS | Performed by: FAMILY MEDICINE

## 2020-11-17 ENCOUNTER — TELEMEDICINE (OUTPATIENT)
Dept: FAMILY MEDICINE CLINIC | Facility: CLINIC | Age: 50
End: 2020-11-17
Payer: COMMERCIAL

## 2020-11-17 DIAGNOSIS — B97.21 SARS-ASSOCIATED CORONAVIRUS INFECTION: Primary | ICD-10-CM

## 2020-11-17 LAB — SARS-COV-2 RNA SPEC QL NAA+PROBE: DETECTED

## 2020-11-17 PROCEDURE — 99213 OFFICE O/P EST LOW 20 MIN: CPT | Performed by: FAMILY MEDICINE

## 2020-11-17 PROCEDURE — 1036F TOBACCO NON-USER: CPT | Performed by: FAMILY MEDICINE

## 2020-11-20 DIAGNOSIS — Z11.4 SCREENING FOR HIV (HUMAN IMMUNODEFICIENCY VIRUS): ICD-10-CM

## 2020-11-20 DIAGNOSIS — Z13.220 SCREENING, LIPID: ICD-10-CM

## 2020-11-20 DIAGNOSIS — E78.01 FAMILIAL HYPERCHOLESTEROLEMIA: ICD-10-CM

## 2020-11-20 DIAGNOSIS — Z13.6 SCREENING FOR HYPERTENSION: ICD-10-CM

## 2020-11-20 DIAGNOSIS — Z00.00 ENCOUNTER FOR ANNUAL HEALTH EXAMINATION: Primary | ICD-10-CM

## 2020-11-20 DIAGNOSIS — Z13.29 SCREENING FOR THYROID DISORDER: ICD-10-CM

## 2020-11-20 DIAGNOSIS — E10.9 TYPE 1 DIABETES MELLITUS WITHOUT COMPLICATION (HCC): ICD-10-CM

## 2020-11-23 ENCOUNTER — TELEPHONE (OUTPATIENT)
Dept: FAMILY MEDICINE CLINIC | Facility: CLINIC | Age: 50
End: 2020-11-23

## 2020-11-30 ENCOUNTER — TELEMEDICINE (OUTPATIENT)
Dept: FAMILY MEDICINE CLINIC | Facility: CLINIC | Age: 50
End: 2020-11-30
Payer: COMMERCIAL

## 2020-11-30 DIAGNOSIS — Z11.9 ENCOUNTER FOR SCREENING FOR INFECTIOUS AND PARASITIC DISEASES, UNSPECIFIED: Primary | ICD-10-CM

## 2020-11-30 DIAGNOSIS — Z86.16 HISTORY OF 2019 NOVEL CORONAVIRUS DISEASE (COVID-19): ICD-10-CM

## 2020-11-30 PROCEDURE — 99212 OFFICE O/P EST SF 10 MIN: CPT | Performed by: NURSE PRACTITIONER

## 2020-12-01 DIAGNOSIS — Z11.9 ENCOUNTER FOR SCREENING FOR INFECTIOUS AND PARASITIC DISEASES, UNSPECIFIED: ICD-10-CM

## 2020-12-01 PROCEDURE — U0003 INFECTIOUS AGENT DETECTION BY NUCLEIC ACID (DNA OR RNA); SEVERE ACUTE RESPIRATORY SYNDROME CORONAVIRUS 2 (SARS-COV-2) (CORONAVIRUS DISEASE [COVID-19]), AMPLIFIED PROBE TECHNIQUE, MAKING USE OF HIGH THROUGHPUT TECHNOLOGIES AS DESCRIBED BY CMS-2020-01-R: HCPCS | Performed by: NURSE PRACTITIONER

## 2020-12-03 DIAGNOSIS — Z11.9 ENCOUNTER FOR SCREENING FOR INFECTIOUS AND PARASITIC DISEASES, UNSPECIFIED: Primary | ICD-10-CM

## 2020-12-03 LAB — SARS-COV-2 RNA SPEC QL NAA+PROBE: NOT DETECTED

## 2020-12-08 DIAGNOSIS — T78.40XD ALLERGY, SUBSEQUENT ENCOUNTER: ICD-10-CM

## 2020-12-08 RX ORDER — EPINEPHRINE 0.3 MG/.3ML
INJECTION SUBCUTANEOUS
Qty: 4 EACH | Refills: 1 | Status: SHIPPED | OUTPATIENT
Start: 2020-12-08

## 2021-01-17 LAB
ALBUMIN SERPL-MCNC: 4.4 G/DL (ref 3.8–4.9)
ALBUMIN/CREAT UR: <10 MG/G CREAT (ref 0–29)
ALBUMIN/GLOB SERPL: 1.8 {RATIO} (ref 1.2–2.2)
ALP SERPL-CCNC: 110 IU/L (ref 39–117)
ALT SERPL-CCNC: 17 IU/L (ref 0–32)
AST SERPL-CCNC: 20 IU/L (ref 0–40)
BASOPHILS # BLD AUTO: 0.1 X10E3/UL (ref 0–0.2)
BASOPHILS NFR BLD AUTO: 1 %
BILIRUB SERPL-MCNC: <0.2 MG/DL (ref 0–1.2)
BUN SERPL-MCNC: 12 MG/DL (ref 6–24)
BUN/CREAT SERPL: 20 (ref 9–23)
CALCIUM SERPL-MCNC: 9.3 MG/DL (ref 8.7–10.2)
CHLORIDE SERPL-SCNC: 101 MMOL/L (ref 96–106)
CHOLEST SERPL-MCNC: 190 MG/DL (ref 100–199)
CHOLEST/HDLC SERPL: 3.2 RATIO (ref 0–4.4)
CO2 SERPL-SCNC: 20 MMOL/L (ref 20–29)
CREAT SERPL-MCNC: 0.61 MG/DL (ref 0.57–1)
CREAT UR-MCNC: 29.9 MG/DL
EOSINOPHIL # BLD AUTO: 0.4 X10E3/UL (ref 0–0.4)
EOSINOPHIL NFR BLD AUTO: 4 %
ERYTHROCYTE [DISTWIDTH] IN BLOOD BY AUTOMATED COUNT: 12.1 % (ref 11.7–15.4)
EST. AVERAGE GLUCOSE BLD GHB EST-MCNC: 177 MG/DL
GLOBULIN SER-MCNC: 2.4 G/DL (ref 1.5–4.5)
GLUCOSE SERPL-MCNC: 191 MG/DL (ref 65–99)
HBA1C MFR BLD: 7.8 % (ref 4.8–5.6)
HCT VFR BLD AUTO: 38.5 % (ref 34–46.6)
HDLC SERPL-MCNC: 59 MG/DL
HGB BLD-MCNC: 12.7 G/DL (ref 11.1–15.9)
HIV 1+2 AB+HIV1 P24 AG SERPL QL IA: NON REACTIVE
IMM GRANULOCYTES # BLD: 0.1 X10E3/UL (ref 0–0.1)
IMM GRANULOCYTES NFR BLD: 1 %
LDLC SERPL CALC-MCNC: 86 MG/DL (ref 0–99)
LDLC SERPL DIRECT ASSAY-MCNC: 93 MG/DL (ref 0–99)
LYMPHOCYTES # BLD AUTO: 3 X10E3/UL (ref 0.7–3.1)
LYMPHOCYTES NFR BLD AUTO: 26 %
MCH RBC QN AUTO: 30.4 PG (ref 26.6–33)
MCHC RBC AUTO-ENTMCNC: 33 G/DL (ref 31.5–35.7)
MCV RBC AUTO: 92 FL (ref 79–97)
MICROALBUMIN UR-MCNC: <3 UG/ML
MONOCYTES # BLD AUTO: 0.7 X10E3/UL (ref 0.1–0.9)
MONOCYTES NFR BLD AUTO: 6 %
NEUTROPHILS # BLD AUTO: 7.1 X10E3/UL (ref 1.4–7)
NEUTROPHILS NFR BLD AUTO: 62 %
PLATELET # BLD AUTO: 497 X10E3/UL (ref 150–450)
POTASSIUM SERPL-SCNC: 4.8 MMOL/L (ref 3.5–5.2)
PROT SERPL-MCNC: 6.8 G/DL (ref 6–8.5)
RBC # BLD AUTO: 4.18 X10E6/UL (ref 3.77–5.28)
SL AMB EGFR AFRICAN AMERICAN: 121 ML/MIN/1.73
SL AMB EGFR NON AFRICAN AMERICAN: 105 ML/MIN/1.73
SL AMB VLDL CHOLESTEROL CALC: 45 MG/DL (ref 5–40)
SODIUM SERPL-SCNC: 138 MMOL/L (ref 134–144)
TRIGL SERPL-MCNC: 273 MG/DL (ref 0–149)
TSH SERPL DL<=0.005 MIU/L-ACNC: 0.69 UIU/ML (ref 0.45–4.5)
WBC # BLD AUTO: 11.4 X10E3/UL (ref 3.4–10.8)

## 2021-01-25 ENCOUNTER — TELEPHONE (OUTPATIENT)
Dept: FAMILY MEDICINE CLINIC | Facility: CLINIC | Age: 51
End: 2021-01-25

## 2021-01-25 NOTE — TELEPHONE ENCOUNTER
Tunde Amador was scheduled to see you on Friday for her CPX  She rescheduled for a day in may,  She was not too happy about rescheduling because she took a day off    She did however have her bw this past Saturday  Can you at least let her know about her  Bw

## 2021-01-26 NOTE — TELEPHONE ENCOUNTER
Abbehuseyin Estrada already spoke to [facility] Ruthie this morning    She had further questions and Abbe Estrada stated that [facility] was checking into them

## 2021-01-26 NOTE — TELEPHONE ENCOUNTER
Patient informed,  She had questions about waiting 90 days after her positive covid test before getting  The first Covid vaccine  Per Dr Marcie Cortez, that is correct to wait 90 days   (patient informed)    Also she asked if her physical could be moved to next month (February) instead of May? 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

## 2021-01-26 NOTE — TELEPHONE ENCOUNTER
PLEASE CALL [facility]    WE CAN TALK MORE WHEN SHE COMES IN    BLOOD COUNT STABLE - SIMILAR TO READINGS IN THE PAST    REVIEWED HER BW  SUGAR WAS A BIT ELEVATED  - HGB A1C WAS 7 8 - SL IMPROVED BUT ELEVATED

## 2021-01-28 DIAGNOSIS — E78.01 FAMILIAL HYPERCHOLESTEROLEMIA: ICD-10-CM

## 2021-01-29 RX ORDER — ATORVASTATIN CALCIUM 10 MG/1
TABLET, FILM COATED ORAL DAILY
Qty: 90 TABLET | Refills: 3 | Status: SHIPPED | OUTPATIENT
Start: 2021-01-29 | End: 2021-11-16

## 2021-02-02 ENCOUNTER — VBI (OUTPATIENT)
Dept: ADMINISTRATIVE | Facility: OTHER | Age: 51
End: 2021-02-02

## 2021-03-05 ENCOUNTER — OFFICE VISIT (OUTPATIENT)
Dept: FAMILY MEDICINE CLINIC | Facility: CLINIC | Age: 51
End: 2021-03-05
Payer: COMMERCIAL

## 2021-03-05 VITALS
WEIGHT: 190 LBS | OXYGEN SATURATION: 98 % | TEMPERATURE: 98.1 F | DIASTOLIC BLOOD PRESSURE: 70 MMHG | BODY MASS INDEX: 37.3 KG/M2 | HEART RATE: 92 BPM | SYSTOLIC BLOOD PRESSURE: 118 MMHG | HEIGHT: 60 IN | RESPIRATION RATE: 18 BRPM

## 2021-03-05 DIAGNOSIS — Z12.31 ENCOUNTER FOR SCREENING MAMMOGRAM FOR BREAST CANCER: ICD-10-CM

## 2021-03-05 DIAGNOSIS — Z00.00 ROUTINE GENERAL MEDICAL EXAMINATION AT A HEALTH CARE FACILITY: Primary | ICD-10-CM

## 2021-03-05 DIAGNOSIS — E66.01 CLASS 2 SEVERE OBESITY DUE TO EXCESS CALORIES WITH SERIOUS COMORBIDITY AND BODY MASS INDEX (BMI) OF 37.0 TO 37.9 IN ADULT (HCC): ICD-10-CM

## 2021-03-05 DIAGNOSIS — E10.9 TYPE 1 DIABETES MELLITUS WITHOUT COMPLICATION (HCC): ICD-10-CM

## 2021-03-05 DIAGNOSIS — E78.2 MIXED HYPERLIPIDEMIA: ICD-10-CM

## 2021-03-05 DIAGNOSIS — Z12.11 COLON CANCER SCREENING: ICD-10-CM

## 2021-03-05 DIAGNOSIS — Z13.6 SCREENING FOR HYPERTENSION: ICD-10-CM

## 2021-03-05 DIAGNOSIS — E03.9 HYPOTHYROIDISM, UNSPECIFIED TYPE: ICD-10-CM

## 2021-03-05 DIAGNOSIS — Z12.4 ENCOUNTER FOR SCREENING FOR CERVICAL CANCER: ICD-10-CM

## 2021-03-05 PROBLEM — Z20.828 SARS-ASSOCIATED CORONAVIRUS EXPOSURE: Status: RESOLVED | Noted: 2020-11-12 | Resolved: 2021-03-05

## 2021-03-05 PROBLEM — E66.812 CLASS 2 SEVERE OBESITY DUE TO EXCESS CALORIES WITH SERIOUS COMORBIDITY AND BODY MASS INDEX (BMI) OF 37.0 TO 37.9 IN ADULT (HCC): Status: ACTIVE | Noted: 2021-03-05

## 2021-03-05 PROBLEM — B97.21 SARS-ASSOCIATED CORONAVIRUS INFECTION: Status: RESOLVED | Noted: 2020-11-17 | Resolved: 2021-03-05

## 2021-03-05 LAB
SL AMB POCT FECES OCC BLD: NORMAL
SL AMB POCT HEMOGLOBIN AIC: 7.4 (ref ?–6.5)

## 2021-03-05 PROCEDURE — 99396 PREV VISIT EST AGE 40-64: CPT | Performed by: FAMILY MEDICINE

## 2021-03-05 PROCEDURE — 1036F TOBACCO NON-USER: CPT | Performed by: FAMILY MEDICINE

## 2021-03-05 PROCEDURE — 3008F BODY MASS INDEX DOCD: CPT | Performed by: FAMILY MEDICINE

## 2021-03-05 PROCEDURE — 83036 HEMOGLOBIN GLYCOSYLATED A1C: CPT | Performed by: FAMILY MEDICINE

## 2021-03-05 PROCEDURE — 3051F HG A1C>EQUAL 7.0%<8.0%: CPT | Performed by: FAMILY MEDICINE

## 2021-03-05 PROCEDURE — 93000 ELECTROCARDIOGRAM COMPLETE: CPT | Performed by: FAMILY MEDICINE

## 2021-03-05 PROCEDURE — 82270 OCCULT BLOOD FECES: CPT | Performed by: FAMILY MEDICINE

## 2021-03-05 RX ORDER — BLOOD-GLUCOSE TRANSMITTER
EACH MISCELLANEOUS
COMMUNITY

## 2021-03-05 RX ORDER — BLOOD-GLUCOSE SENSOR
EACH MISCELLANEOUS
COMMUNITY

## 2021-03-05 RX ORDER — BLOOD-GLUCOSE,RECEIVER,CONT
EACH MISCELLANEOUS
COMMUNITY

## 2021-03-05 NOTE — PROGRESS NOTES
BMI Counseling: Body mass index is 37 11 kg/m²  The BMI is above normal  Nutrition recommendations include encouraging healthy choices of fruits and vegetables and moderation in carbohydrate intake  Exercise recommendations include exercising 3-5 times per week  No pharmacotherapy was ordered  FAMILY PRACTICE HEALTH MAINTENANCE OFFICE VISIT  [facility] [address] [facility]    NAME: Donato Denver  AGE: 44 y o  SEX: female  : 1970     DATE: 3/5/2021    Assessment and Plan     Problem List Items Addressed This Visit        Endocrine    Type 1 diabetes mellitus without complication ([address] )    Relevant Orders    POCT hemoglobin A1c (Completed)    POCT ECG (Completed)    Hypothyroidism       Other    Hyperlipidemia    Routine general medical examination at a health care facility - Primary    Relevant Orders    POCT hemoglobin A1c (Completed)    POCT ECG (Completed)    Screening for hypertension    Relevant Orders    POCT ECG (Completed)    Encounter for screening mammogram for breast cancer    Colon cancer screening    Relevant Orders    POCT hemoccult screening (Completed)    Class 2 severe obesity due to excess calories with serious comorbidity and body mass index (BMI) of 37 0 to 37 9 in adult [facility])               Return in about 6 months (around 2021) for Recheck  Chief Complaint     Chief Complaint   Patient presents with    Annual Exam     BW DONE    Gynecologic Exam     LMP: pt taking BCP, no period, controls migraines    Foot exam     shoes & socks off       History of Present Illness     DISCUSSED HEALTH ISSUES  REVIEWED MEDICAL RECORD  NO CONCERNS AT THIS TIME        Well Adult Physical   Patient here for a comprehensive physical exam       Diet and Physical Activity  Diet: well balanced diet  Weight concerns: Patient has class 2 obesity (BMI 35 0-39  9)  Exercise: occasionally      Depression Screen  PHQ-9 Depression Screening    PHQ-9:   Frequency of the following problems over the past two weeks:      Little interest or pleasure in doing things: 0 - not at all  Feeling down, depressed, or hopeless: 0 - not at all          General Health  Hearing: Normal:  bilateral  Vision: no vision problems  Dental: regular dental visits    Reproductive Health          The following portions of the patient's history were reviewed and updated as appropriate: allergies, current medications, past family history, past medical history, past social history, past surgical history and problem list     Review of Systems     Review of Systems   Constitutional: Negative for chills, fatigue and fever  HENT: Negative for congestion, ear discharge, ear pain, mouth sores, postnasal drip, sore throat and trouble swallowing  Eyes: Negative for pain, discharge and visual disturbance  Respiratory: Negative for cough, shortness of breath and wheezing  Cardiovascular: Negative for chest pain, palpitations and leg swelling  Gastrointestinal: Negative for abdominal distention, abdominal pain, blood in stool, diarrhea and nausea  Endocrine: Negative for polydipsia, polyphagia and polyuria  Genitourinary: Negative for dysuria, frequency, hematuria and urgency  Musculoskeletal: Negative for arthralgias, gait problem and joint swelling  Skin: Negative for pallor and rash  Neurological: Negative for dizziness, syncope, speech difficulty, weakness, light-headedness, numbness and headaches  Hematological: Negative for adenopathy  Psychiatric/Behavioral: Negative for behavioral problems, confusion and sleep disturbance  The patient is not nervous/anxious          Past Medical History     Past Medical History:   Diagnosis Date    COVID-19 virus infection 11/14/2020    Diabetes ([facility] [address] )     Disease of thyroid gland     Hyperlipidemia     last assessed 5/11/15       Past Surgical History     Past Surgical History:   Procedure Laterality Date    APPENDECTOMY      CARPAL TUNNEL RELEASE Bilateral     CYST REMOVAL      hand, tendon cyst    GANGLION CYST EXCISION      TONSILLECTOMY      WISDOM TOOTH EXTRACTION         Social History     Social History     Socioeconomic History    Marital status: Single     Spouse name: None    Number of children: None    Years of education: None    Highest education level: None   Occupational History    None   Social Needs    Financial resource strain: None    Food insecurity     Worry: None     Inability: None    Transportation needs     Medical: None     Non-medical: None   Tobacco Use    Smoking status: Former Smoker     Start date: 3/29/2013    Smokeless tobacco: Never Used   Substance and Sexual Activity    Alcohol use: No    Drug use: No    Sexual activity: None   Lifestyle    Physical activity     Days per week: None     Minutes per session: None    Stress: None   Relationships    Social connections     Talks on phone: None     Gets together: None     Attends Zoroastrian service: None     Active member of club or organization: None     Attends meetings of clubs or organizations: None     Relationship status: None    Intimate partner violence     Fear of current or ex partner: None     Emotionally abused: None     Physically abused: None     Forced sexual activity: None   Other Topics Concern    None   Social History Narrative    Daily caffeine consumption;2-3 serving a day    Dental care occasionally       Family History     Family History   Problem Relation Age of Onset    No Known Problems Mother     Hypertension Father     Cancer Maternal Grandmother     Liver cancer Maternal Grandmother     No Known Problems Sister     No Known Problems Brother     No Known Problems Maternal Aunt     No Known Problems Maternal Uncle     No Known Problems Paternal Aunt     No Known Problems Paternal Uncle     No Known Problems Maternal Grandfather     No Known Problems Paternal Grandmother     Diabetes Ami Ramachandran illness Neg Hx     Substance Abuse Neg Hx        Current Medications       Current Outpatient Medications:     Accu-Chek FastClix Lancets MISC, , Disp: , Rfl:     atorvastatin (LIPITOR) 10 mg tablet, TAKE 1 TABLET BY MOUTH  DAILY, Disp: 90 tablet, Rfl: 3    BAQSIMI TWO PACK 3 MG/DOSE POWD, , Disp: , Rfl:     Blood Glucose Monitoring Suppl (FREESTYLE FREEDOM LITE) w/Device KIT, by Does not apply route, Disp: , Rfl:     Cholecalciferol (VITAMIN D3 PO), Take 1,000 Int'l Units by mouth, Disp: , Rfl:     Continuous Blood Gluc  (Dexcom G6 ) SAIMA, Use, Disp: , Rfl:     Continuous Blood Gluc Sensor (Dexcom G6 Sensor) MISC, Use, Disp: , Rfl:     Continuous Blood Gluc Transmit (Dexcom G6 Transmitter) MISC, Use, Disp: , Rfl:     EPINEPHrine (EPIPEN) 0 3 mg/0 3 mL SOAJ, INJECT AS NEEDED FOR  ALLERGIC RESPONSE AS  DIRECTED BY YOUR PHYSICIAN, Disp: 4 each, Rfl: 1    insulin lispro (HUMALOG) 100 units/mL injection, Inject under the skin, Disp: , Rfl:     levothyroxine 125 mcg tablet, , Disp: , Rfl:     Multiple Vitamins-Minerals (CENTRUM SILVER 50+WOMEN PO), Take by mouth, Disp: , Rfl:     Nortrel 7/7/7 0 5/0 75/1-35 MG-MCG per tablet, TAKE 1 TABLET BY MOUTH  DAILY, Disp: 84 tablet, Rfl: 3     Allergies     Allergies   Allergen Reactions    Penicillins     Sulfites        Objective     /70   Pulse 92   Temp 98 1 °F (36 7 °C) (Temporal)   Resp 18   Ht 5' (1 524 m)   Wt 86 2 kg (190 lb)   SpO2 98%   BMI 37 11 kg/m²      Physical Exam  Vitals signs reviewed  Constitutional:       Appearance: She is well-developed  HENT:      Head: Normocephalic and atraumatic  Right Ear: Tympanic membrane and external ear normal       Left Ear: Tympanic membrane and external ear normal       Nose: Nose normal    Eyes:      General:         Right eye: No discharge  Left eye: No discharge        Conjunctiva/sclera: Conjunctivae normal       Pupils: Pupils are equal, round, and reactive to light    Neck:      Musculoskeletal: Normal range of motion and neck supple  Thyroid: No thyromegaly  Cardiovascular:      Rate and Rhythm: Normal rate and regular rhythm  Pulses: no weak pulses          Dorsalis pedis pulses are 2+ on the right side and 2+ on the left side  Posterior tibial pulses are 2+ on the right side and 2+ on the left side  Heart sounds: Normal heart sounds  No murmur  Pulmonary:      Effort: Pulmonary effort is normal       Breath sounds: Normal breath sounds  No wheezing or rales  Abdominal:      General: Bowel sounds are normal  There is no distension  Palpations: Abdomen is soft  There is no mass  Tenderness: There is no abdominal tenderness  There is no guarding or rebound  Genitourinary:     General: Normal vulva  Vagina: Normal  No vaginal discharge  Rectum: Normal  Guaiac result negative  Musculoskeletal: Normal range of motion  General: No tenderness or deformity  Feet:      Right foot:      Skin integrity: No ulcer, skin breakdown, erythema, warmth, callus or dry skin  Left foot:      Skin integrity: No ulcer, skin breakdown, erythema, warmth, callus or dry skin  Lymphadenopathy:      Cervical: No cervical adenopathy  Skin:     General: Skin is warm and dry  Findings: No erythema or rash  Neurological:      Mental Status: She is alert and oriented to person, place, and time  Cranial Nerves: No cranial nerve deficit  Motor: No abnormal muscle tone  Coordination: Coordination normal       Deep Tendon Reflexes: Reflexes are normal and symmetric  Psychiatric:         Behavior: Behavior normal          Thought Content: Thought content normal          Judgment: Judgment normal        Patient's shoes and socks removed  Right Foot/Ankle   Right Foot Inspection  Skin Exam: skin normal and skin intact no dry skin, no warmth, no callus, no erythema, no maceration, no abnormal color, no pre-ulcer, no ulcer and no callus                          Toe Exam: ROM and strength within normal limits  Sensory       Monofilament testing: intact  Vascular    The right DP pulse is 2+  The right PT pulse is 2+  Left Foot/Ankle  Left Foot Inspection  Skin Exam: skin normal and skin intactno dry skin, no warmth, no erythema, no maceration, normal color, no pre-ulcer, no ulcer and no callus                         Toe Exam: ROM and strength within normal limits                   Sensory       Monofilament: intact  Vascular    The left DP pulse is 2+  The left PT pulse is 2+  Assign Risk Category:  No deformity present; No loss of protective sensation;  No weak pulses       Risk: 0      No exam data present    Health Maintenance     Health Maintenance   Topic Date Due    BMI: Followup Plan  04/03/1986    Diabetic Foot Exam  07/31/2018    Annual Physical  11/13/2019    Cervical Cancer Screening  08/01/2020    HEMOGLOBIN A1C  06/05/2021    Depression Screening PHQ  11/12/2021    URINE MICROALBUMIN  01/16/2022    BMI: Adult  03/05/2022    Colorectal Cancer Screening  04/28/2022    DM Eye Exam  08/06/2022    DTaP,Tdap,and Td Vaccines (3 - Td) 11/13/2028    HIV Screening  Completed    Pneumococcal Vaccine: Pediatrics (0 to 5 Years) and At-Risk Patients (6 to 59 Years)  Completed    Influenza Vaccine  Completed    HIB Vaccine  Aged Out    Hepatitis B Vaccine  Aged Out    IPV Vaccine  Aged Out    Hepatitis A Vaccine  Aged Out    Meningococcal ACWY Vaccine  Aged Out    HPV Vaccine  Aged Out     Immunization History   Administered Date(s) Administered    INFLUENZA 10/23/2018, 10/23/2018    Influenza Injectable, MDCK, Preservative Free, Quadrivalent, 0 5 mL 11/10/2020    Influenza Quadrivalent Preservative Free 3 years and older IM 10/01/2016    Influenza, seasonal, injectable 09/17/2014    Influenza, seasonal, injectable, preservative free 09/15/2015    Pneumococcal Conjugate 13-Valent 11/21/2018    Pneumococcal Polysaccharide PPV23 12/03/2001, 04/03/2013    Tdap 05/23/2007, 11/13/2018       Santina Harada, MD  [facility]

## 2021-03-05 NOTE — PATIENT INSTRUCTIONS
DISCUSSED HEALTH ISSUES  HEALTHY DIET AND EXERCISE  BW WILL BE OBTAINED  MAMMOGRAPHY   RECOMMEND CALCIUM 8706-8607 MG DAILY  VITAMIN D3  1000 IU DAILY  RV IN 1 YEAR FOR ANNUAL EXAM, SOONER IF NEEDED    Recent Results (from the past 2688 hour(s))   Novel Coronavirus (COVID-19), PCR [facility] - Collected at [place] [address] or [facility]    Collection Time: 11/14/20 10:52 AM    Specimen: Nose; Nares   Result Value Ref Range    SARS-CoV-2  Detected (A) Not Detected   Novel Coronavirus (COVID-19), PCR [facility] - Collected at [place] [address] or [facility]    Collection Time: 12/01/20  8:02 AM    Specimen: Nose; Nares   Result Value Ref Range    SARS-CoV-2  Not Detected Not Detected   CBC and differential    Collection Time: 01/16/21  7:09 AM   Result Value Ref Range    White Blood Cell Count 11 4 (H) 3 4 - 10 8 x10E3/uL    Red Blood Cell Count 4 18 3 77 - 5 28 x10E6/uL    Hemoglobin 12 7 11 1 - 15 9 g/dL    HCT 38 5 34 0 - 46 6 %    MCV 92 79 - 97 fL    MCH 30 4 26 6 - 33 0 pg    MCHC 33 0 31 5 - 35 7 g/dL    RDW 12 1 11 7 - 15 4 %    Platelet Count 232 (H) 150 - 450 x10E3/uL    Neutrophils 62 Not Estab  %    Lymphocytes 26 Not Estab  %    Monocytes 6 Not Estab  %    Eosinophils 4 Not Estab  %    Basophils PCT 1 Not Estab  %    Neutrophils (Absolute) 7 1 (H) 1 4 - 7 0 x10E3/uL    Lymphocytes (Absolute) 3 0 0 7 - 3 1 x10E3/uL    Monocytes (Absolute) 0 7 0 1 - 0 9 x10E3/uL    Eosinophils (Absolute) 0 4 0 0 - 0 4 x10E3/uL    Basophils ABS 0 1 0 0 - 0 2 x10E3/uL    Immature Granulocytes 1 Not Estab  %    Immature Granulocytes (Absolute) 0 1 0 0 - 0 1 x10E3/uL   Comprehensive metabolic panel    Collection Time: 01/16/21  7:09 AM   Result Value Ref Range    Glucose, Random 191 (H) 65 - 99 mg/dL    BUN 12 6 - 24 mg/dL    Creatinine 0 61 0 57 - 1 00 mg/dL    eGFR Non African American 105 >59 mL/min/1 73    eGFR  121 >59 mL/min/1 73    SL AMB BUN/CREATININE RATIO 20 9 - 23    Sodium 138 134 - 144 mmol/L    Potassium 4 8 3 5 - 5 2 mmol/L    Chloride 101 96 - 106 mmol/L    CO2 20 20 - 29 mmol/L    CALCIUM 9 3 8 7 - 10 2 mg/dL    Protein, Total 6 8 6 0 - 8 5 g/dL    Albumin 4 4 3 8 - 4 9 g/dL    Globulin, Total 2 4 1 5 - 4 5 g/dL    Albumin/Globulin Ratio 1 8 1 2 - 2 2    TOTAL BILIRUBIN <0 2 0 0 - 1 2 mg/dL    Alk Phos Isoenzymes 110 39 - 117 IU/L    AST 20 0 - 40 IU/L    ALT 17 0 - 32 IU/L   Hemoglobin A1C    Collection Time: 01/16/21  7:09 AM   Result Value Ref Range    Hemoglobin A1C 7 8 (H) 4 8 - 5 6 %    Estimated Average Glucose 177 mg/dL   Lipid panel    Collection Time: 01/16/21  7:09 AM   Result Value Ref Range    Cholesterol, Total 190 100 - 199 mg/dL    Triglycerides 273 (H) 0 - 149 mg/dL    HDL 59 >39 mg/dL    VLDL Cholesterol Calculated 45 (H) 5 - 40 mg/dL    LDL Calculated 86 0 - 99 mg/dL    T  Chol/HDL Ratio 3 2 0 0 - 4 4 ratio   Microalbumin / creatinine urine ratio    Collection Time: 01/16/21  7:09 AM   Result Value Ref Range    Creatinine, Urine 29 9 Not Estab  mg/dL    Microalbum  ,U,Random <3 0 Not Estab  ug/mL    Microalb/Creat Ratio <10 0 - 29 mg/g creat   TSH, 3rd generation    Collection Time: 01/16/21  7:09 AM   Result Value Ref Range    TSH 0 694 0 450 - 4 500 uIU/mL   Human Immunodeficiency Virus 1/2 Antigen / Antibody ( Fourth Generation) with Reflex Testing    Collection Time: 01/16/21  7:09 AM   Result Value Ref Range    HIV Screen 4th Generation wRflx Non Reactive Non Reactive   LDL cholesterol, direct    Collection Time: 01/16/21  7:09 AM   Result Value Ref Range    LDL Direct 93 0 - 99 mg/dL

## 2021-03-05 NOTE — LETTER
JULIA EVANS      Current Outpatient Medications:     Accu-Chek FastClix Lancets MISC, , Disp: , Rfl:     atorvastatin (LIPITOR) 10 mg tablet, TAKE 1 TABLET BY MOUTH  DAILY, Disp: 90 tablet, Rfl: 3    BAQSIMI TWO PACK 3 MG/DOSE POWD, , Disp: , Rfl:     Blood Glucose Monitoring Suppl (FREESTYLE FREEDOM LITE) w/Device KIT, by Does not apply route, Disp: , Rfl:     Cholecalciferol (VITAMIN D3 PO), Take 1,000 Int'l Units by mouth, Disp: , Rfl:     EPINEPHrine (EPIPEN) 0 3 mg/0 3 mL SOAJ, INJECT AS NEEDED FOR  ALLERGIC RESPONSE AS  DIRECTED BY YOUR PHYSICIAN, Disp: 4 each, Rfl: 1    insulin lispro (HUMALOG) 100 units/mL injection, Inject under the skin, Disp: , Rfl:     levothyroxine 125 mcg tablet, , Disp: , Rfl:     Multiple Vitamins-Minerals (CENTRUM SILVER 50+WOMEN PO), Take by mouth, Disp: , Rfl:     Nortrel 7/7/7 0 5/0 75/1-35 MG-MCG per tablet, TAKE 1 TABLET BY MOUTH  DAILY, Disp: 84 tablet, Rfl: 3      Recent Results (from the past 2688 hour(s))   Novel Coronavirus (COVID-19), PCR [facility] - Collected at [place] [address] or [facility]    Collection Time: 11/14/20 10:52 AM    Specimen: Nose; Nares   Result Value Ref Range    SARS-CoV-2  Detected (A) Not Detected   Novel Coronavirus (COVID-19), PCR [facility] - Collected at [place] [address] or [facility]    Collection Time: 12/01/20  8:02 AM    Specimen: Nose; Nares   Result Value Ref Range    SARS-CoV-2  Not Detected Not Detected   CBC and differential    Collection Time: 01/16/21  7:09 AM   Result Value Ref Range    White Blood Cell Count 11 4 (H) 3 4 - 10 8 x10E3/uL    Red Blood Cell Count 4 18 3 77 - 5 28 x10E6/uL    Hemoglobin 12 7 11 1 - 15 9 g/dL    HCT 38 5 34 0 - 46 6 %    MCV 92 79 - 97 fL    MCH 30 4 26 6 - 33 0 pg    MCHC 33 0 31 5 - 35 7 g/dL    RDW 12 1 11 7 - 15 4 %    Platelet Count 332 (H) 150 - 450 x10E3/uL    Neutrophils 62 Not Estab  %    Lymphocytes 26 Not Estab  %    Monocytes 6 Not Estab  %    Eosinophils 4 Not Estab  %    Basophils PCT 1 Not Estab  %    Neutrophils (Absolute) 7 1 (H) 1 4 - 7 0 x10E3/uL    Lymphocytes (Absolute) 3 0 0 7 - 3 1 x10E3/uL    Monocytes (Absolute) 0 7 0 1 - 0 9 x10E3/uL    Eosinophils (Absolute) 0 4 0 0 - 0 4 x10E3/uL    Basophils ABS 0 1 0 0 - 0 2 x10E3/uL    Immature Granulocytes 1 Not Estab  %    Immature Granulocytes (Absolute) 0 1 0 0 - 0 1 x10E3/uL   Comprehensive metabolic panel    Collection Time: 01/16/21  7:09 AM   Result Value Ref Range    Glucose, Random 191 (H) 65 - 99 mg/dL    BUN 12 6 - 24 mg/dL    Creatinine 0 61 0 57 - 1 00 mg/dL    eGFR Non African American 105 >59 mL/min/1 73    eGFR  121 >59 mL/min/1 73    SL AMB BUN/CREATININE RATIO 20 9 - 23    Sodium 138 134 - 144 mmol/L    Potassium 4 8 3 5 - 5 2 mmol/L    Chloride 101 96 - 106 mmol/L    CO2 20 20 - 29 mmol/L    CALCIUM 9 3 8 7 - 10 2 mg/dL    Protein, Total 6 8 6 0 - 8 5 g/dL    Albumin 4 4 3 8 - 4 9 g/dL    Globulin, Total 2 4 1 5 - 4 5 g/dL    Albumin/Globulin Ratio 1 8 1 2 - 2 2    TOTAL BILIRUBIN <0 2 0 0 - 1 2 mg/dL    Alk Phos Isoenzymes 110 39 - 117 IU/L    AST 20 0 - 40 IU/L    ALT 17 0 - 32 IU/L   Hemoglobin A1C    Collection Time: 01/16/21  7:09 AM   Result Value Ref Range    Hemoglobin A1C 7 8 (H) 4 8 - 5 6 %    Estimated Average Glucose 177 mg/dL   Lipid panel    Collection Time: 01/16/21  7:09 AM   Result Value Ref Range    Cholesterol, Total 190 100 - 199 mg/dL    Triglycerides 273 (H) 0 - 149 mg/dL    HDL 59 >39 mg/dL    VLDL Cholesterol Calculated 45 (H) 5 - 40 mg/dL    LDL Calculated 86 0 - 99 mg/dL    T  Chol/HDL Ratio 3 2 0 0 - 4 4 ratio   Microalbumin / creatinine urine ratio    Collection Time: 01/16/21  7:09 AM   Result Value Ref Range    Creatinine, Urine 29 9 Not Estab  mg/dL    Microalbum  ,U,Random <3 0 Not Estab  ug/mL    Microalb/Creat Ratio <10 0 - 29 mg/g creat   TSH, 3rd generation    Collection Time: 01/16/21  7:09 AM   Result Value Ref Range    TSH 0 694 0 450 - 4 500 uIU/mL   Human Immunodeficiency Virus 1/2 Antigen / Antibody ( Fourth Generation) with Reflex Testing    Collection Time: 01/16/21  7:09 AM   Result Value Ref Range    HIV Screen 4th Generation wRflx Non Reactive Non Reactive   LDL cholesterol, direct    Collection Time: 01/16/21  7:09 AM   Result Value Ref Range    LDL Direct 93 0 - 99 mg/dL

## 2021-03-09 LAB
CYTOLOGIST CVX/VAG CYTO: NORMAL
DX ICD CODE: NORMAL
HPV I/H RISK 4 DNA CVX QL PROBE+SIG AMP: NEGATIVE
Lab: NORMAL
OTHER STN SPEC: NORMAL
PATH REPORT.FINAL DX SPEC: NORMAL
SL AMB NOTE:: NORMAL
SL AMB SPECIMEN ADEQUACY: NORMAL
SL AMB TEST METHODOLOGY: NORMAL

## 2021-03-10 DIAGNOSIS — Z23 ENCOUNTER FOR IMMUNIZATION: ICD-10-CM

## 2021-03-17 ENCOUNTER — IMMUNIZATIONS (OUTPATIENT)
Dept: FAMILY MEDICINE CLINIC | Facility: HOSPITAL | Age: 51
End: 2021-03-17

## 2021-03-17 DIAGNOSIS — Z23 ENCOUNTER FOR IMMUNIZATION: Primary | ICD-10-CM

## 2021-03-17 PROCEDURE — 0011A SARS-COV-2 / COVID-19 MRNA VACCINE (MODERNA) 100 MCG: CPT

## 2021-03-17 PROCEDURE — 91301 SARS-COV-2 / COVID-19 MRNA VACCINE (MODERNA) 100 MCG: CPT

## 2021-04-16 ENCOUNTER — IMMUNIZATIONS (OUTPATIENT)
Dept: FAMILY MEDICINE CLINIC | Facility: HOSPITAL | Age: 51
End: 2021-04-16

## 2021-04-16 DIAGNOSIS — Z23 ENCOUNTER FOR IMMUNIZATION: Primary | ICD-10-CM

## 2021-04-16 PROCEDURE — 0012A SARS-COV-2 / COVID-19 MRNA VACCINE (MODERNA) 100 MCG: CPT

## 2021-04-16 PROCEDURE — 91301 SARS-COV-2 / COVID-19 MRNA VACCINE (MODERNA) 100 MCG: CPT

## 2021-04-17 LAB — HBA1C MFR BLD HPLC: 7.6 %

## 2021-05-17 ENCOUNTER — TELEPHONE (OUTPATIENT)
Dept: FAMILY MEDICINE CLINIC | Facility: CLINIC | Age: 51
End: 2021-05-17

## 2021-05-17 NOTE — TELEPHONE ENCOUNTER
Glenn Salinas states she saw endo in April and goes back in July  She will call Dr Doug Ferrera and have them fax the report    Thanks

## 2021-05-21 NOTE — TELEPHONE ENCOUNTER
Pt declined an appt for a physical/DM f/u  Pt saw endo in April and had labs done (under media in chart)

## 2021-05-24 ENCOUNTER — TELEPHONE (OUTPATIENT)
Dept: ADMINISTRATIVE | Facility: OTHER | Age: 51
End: 2021-05-24

## 2021-05-24 NOTE — TELEPHONE ENCOUNTER
----- Message from Mitchel Maria sent at 5/21/2021 11:43 AM EDT -----  Regarding: care gap request - A1C  05/21/21 11:43 AM    Hello, our patient attached above has had Hemoglobin A1c completed/performed  Please assist in updating the patient chart by pulling the document from the Media Tab  The date of service is 4/2021       Thank you,  Mitchel Maria  [address]

## 2021-05-24 NOTE — TELEPHONE ENCOUNTER
Upon review of the In Basket request we were able to note that no further action is required  The patient chart is up to date  Any additional questions or concerns should be emailed to the Practice Liaisons via [email or web address] email, please do not reply via In Basket      Thank you  Keon Wu

## 2021-06-22 DIAGNOSIS — Z30.41 ORAL CONTRACEPTIVE PILL SURVEILLANCE: ICD-10-CM

## 2021-06-22 RX ORDER — NORETHINDRONE AND ETHINYL ESTRADIOL 7 DAYS X 3
KIT ORAL
Qty: 84 TABLET | Refills: 3 | Status: SHIPPED | OUTPATIENT
Start: 2021-06-22 | End: 2022-05-04

## 2021-09-24 ENCOUNTER — VBI (OUTPATIENT)
Dept: ADMINISTRATIVE | Facility: OTHER | Age: 51
End: 2021-09-24

## 2021-11-16 DIAGNOSIS — E78.01 FAMILIAL HYPERCHOLESTEROLEMIA: ICD-10-CM

## 2021-11-16 RX ORDER — ATORVASTATIN CALCIUM 10 MG/1
TABLET, FILM COATED ORAL DAILY
Qty: 90 TABLET | Refills: 3 | Status: SHIPPED | OUTPATIENT
Start: 2021-11-16

## 2021-11-23 ENCOUNTER — IMMUNIZATIONS (OUTPATIENT)
Dept: FAMILY MEDICINE CLINIC | Facility: HOSPITAL | Age: 51
End: 2021-11-23

## 2021-11-23 DIAGNOSIS — Z23 ENCOUNTER FOR IMMUNIZATION: Primary | ICD-10-CM

## 2021-11-23 PROCEDURE — 91306 COVID-19 MODERNA VACC 0.25 ML BOOSTER: CPT

## 2021-11-23 PROCEDURE — 0064A COVID-19 MODERNA VACC 0.25 ML BOOSTER: CPT

## 2022-02-02 ENCOUNTER — OFFICE VISIT (OUTPATIENT)
Dept: OBGYN CLINIC | Facility: CLINIC | Age: 52
End: 2022-02-02
Payer: COMMERCIAL

## 2022-02-02 ENCOUNTER — APPOINTMENT (OUTPATIENT)
Dept: RADIOLOGY | Facility: CLINIC | Age: 52
End: 2022-02-02
Payer: COMMERCIAL

## 2022-02-02 VITALS — DIASTOLIC BLOOD PRESSURE: 97 MMHG | SYSTOLIC BLOOD PRESSURE: 145 MMHG | HEART RATE: 103 BPM

## 2022-02-02 DIAGNOSIS — M79.644 PAIN OF RIGHT THUMB: ICD-10-CM

## 2022-02-02 DIAGNOSIS — M18.11 ARTHRITIS OF CARPOMETACARPAL (CMC) JOINT OF RIGHT THUMB: Primary | ICD-10-CM

## 2022-02-02 PROCEDURE — 99214 OFFICE O/P EST MOD 30 MIN: CPT | Performed by: ORTHOPAEDIC SURGERY

## 2022-02-02 PROCEDURE — 73140 X-RAY EXAM OF FINGER(S): CPT

## 2022-02-02 PROCEDURE — 3080F DIAST BP >= 90 MM HG: CPT | Performed by: ORTHOPAEDIC SURGERY

## 2022-02-02 PROCEDURE — 3077F SYST BP >= 140 MM HG: CPT | Performed by: ORTHOPAEDIC SURGERY

## 2022-02-02 NOTE — PROGRESS NOTES
Assessment/Plan:  1  Arthritis of carpometacarpal (CMC) joint of right thumb  Thumb Cude comf/Cool   2  Pain of right thumb  XR thumb right first digit-min 2v       Scribe Attestation    I,:  Marilyn Chandler MA am acting as a scribe while in the presence of the attending physician :       I,:  Freddie Cortes MD personally performed the services described in this documentation    as scribed in my presence :             I discussed with Becca Bynum that her signs and symptoms are consistent with right thumb CMC arthritis  X-rays were reviewed in the office today which demonstrate moderate to severe right thumb CMC arthritis  She is tender to palpation over the thumb CMC on exam  She does have a positive grind  Treatment options were discussed in the form of bracing  She was agreeable to this  She was fitted and provided with a right comfort cool brace she can wear as needed  We did also discuss a steroid injection however, I did caution her on this due to her diabetes  Patient would like to hold off on a steroid injection at this time  She is also not interested in surgical intervention  She may follow up with me as needed  Subjective:   Belkys Jacome is a 46 y o  female who presents to the office today for evaluation of right thumb pain  Patient states this has been ongoing for the past week  She denies any known injury or trauma  Patient states she was working in her basement recently  She notes pain to the base of her thumb  She also notes popping with range of motion  She denies any locking or triggering  She has not been taking anything OTC for pain  Patient was evaluated by Dr Marcellus Scott in the past and given a thumb spica brace  She states the brace did increase her pain  Patient is a type one diabetic and states her last A1 C was 7 6  Review of Systems   Constitutional: Negative for chills and fever  HENT: Negative for drooling and sneezing  Eyes: Negative for redness     Respiratory: Negative for cough and wheezing  Gastrointestinal: Negative for nausea and vomiting  Musculoskeletal: Negative for arthralgias, joint swelling and myalgias  Neurological: Negative for weakness and numbness  Psychiatric/Behavioral: Negative for behavioral problems  The patient is not nervous/anxious            Past Medical History:   Diagnosis Date    COVID-19 virus infection 11/14/2020    Diabetes ([facility] [address] )     Disease of thyroid gland     Hyperlipidemia     last assessed 5/11/15       Past Surgical History:   Procedure Laterality Date    APPENDECTOMY      CARPAL TUNNEL RELEASE Bilateral     CYST REMOVAL      hand, tendon cyst    GANGLION CYST EXCISION      TONSILLECTOMY      WISDOM TOOTH EXTRACTION         Family History   Problem Relation Age of Onset    No Known Problems Mother     Hypertension Father     Cancer Maternal Grandmother     Liver cancer Maternal Grandmother     No Known Problems Sister     No Known Problems Brother     No Known Problems Maternal Aunt     No Known Problems Maternal Uncle     No Known Problems Paternal Aunt     No Known Problems Paternal Uncle     No Known Problems Maternal Grandfather     No Known Problems Paternal Grandmother     Diabetes Paternal Grandfather     Mental illness Neg Hx     Substance Abuse Neg Hx        Social History     Occupational History    Not on file   Tobacco Use    Smoking status: Former Smoker     Start date: 3/29/2013    Smokeless tobacco: Never Used   Vaping Use    Vaping Use: Never used   Substance and Sexual Activity    Alcohol use: No    Drug use: No    Sexual activity: Not on file         Current Outpatient Medications:     Accu-Chek FastClix Lancets MISC, , Disp: , Rfl:     atorvastatin (LIPITOR) 10 mg tablet, TAKE 1 TABLET BY MOUTH  DAILY, Disp: 90 tablet, Rfl: 3    BAQSIMI TWO PACK 3 MG/DOSE POWD, , Disp: , Rfl:     Blood Glucose Monitoring Suppl (FREESTYLE FREEDOM LITE) w/Device KIT, by Does not apply route, Disp: , Rfl:     Cholecalciferol (VITAMIN D3 PO), Take 1,000 Int'l Units by mouth, Disp: , Rfl:     Continuous Blood Gluc  (Dexcom G6 ) SAIMA, Use, Disp: , Rfl:     Continuous Blood Gluc Sensor (Dexcom G6 Sensor) MISC, Use, Disp: , Rfl:     Continuous Blood Gluc Transmit (Dexcom G6 Transmitter) MISC, Use, Disp: , Rfl:     EPINEPHrine (EPIPEN) 0 3 mg/0 3 mL SOAJ, INJECT AS NEEDED FOR  ALLERGIC RESPONSE AS  DIRECTED BY YOUR PHYSICIAN, Disp: 4 each, Rfl: 1    insulin lispro (HUMALOG) 100 units/mL injection, Inject under the skin, Disp: , Rfl:     levothyroxine 125 mcg tablet, , Disp: , Rfl:     Multiple Vitamins-Minerals (CENTRUM SILVER 50+WOMEN PO), Take by mouth, Disp: , Rfl:     Nortrel 7/7/7 0 5/0 75/1-35 MG-MCG per tablet, TAKE 1 TABLET BY MOUTH  DAILY, Disp: 84 tablet, Rfl: 3    Allergies   Allergen Reactions    Penicillins     Sulfites - Food Allergy        Objective:  Vitals:    02/02/22 1334   BP: 145/97   Pulse: 103       Right Hand Exam     Tenderness   Right hand tenderness location: thumb CMC  Other   Erythema: absent  Sensation: normal  Pulse: present    Comments:  + grind  Crepitus with thumb ROM  5/5 pinch strength  5/5 APB  Sensation intact median, radial, and ulnar nerve             Physical Exam  Constitutional:       Appearance: She is well-developed  HENT:      Head: Normocephalic and atraumatic  Eyes:      General:         Right eye: No discharge  Left eye: No discharge  Conjunctiva/sclera: Conjunctivae normal    Cardiovascular:      Rate and Rhythm: Normal rate  Pulmonary:      Effort: Pulmonary effort is normal  No respiratory distress  Musculoskeletal:      Cervical back: Normal range of motion and neck supple  Comments: As noted in HPI   Skin:     General: Skin is warm and dry  Neurological:      Mental Status: She is alert and oriented to person, place, and time     Psychiatric:         Behavior: Behavior normal          Thought Content: Thought content normal          Judgment: Judgment normal          I have personally reviewed pertinent films in PACS and my interpretation is as follows:X-ray right thumb performed in the office today demonstrate moderate to severe right thumb CMC arthritis

## 2022-03-24 ENCOUNTER — OFFICE VISIT (OUTPATIENT)
Dept: FAMILY MEDICINE CLINIC | Facility: CLINIC | Age: 52
End: 2022-03-24
Payer: COMMERCIAL

## 2022-03-24 VITALS
SYSTOLIC BLOOD PRESSURE: 144 MMHG | OXYGEN SATURATION: 98 % | WEIGHT: 196 LBS | BODY MASS INDEX: 38.48 KG/M2 | HEIGHT: 60 IN | DIASTOLIC BLOOD PRESSURE: 92 MMHG | TEMPERATURE: 97.8 F | RESPIRATION RATE: 16 BRPM | HEART RATE: 84 BPM

## 2022-03-24 DIAGNOSIS — E10.9 TYPE 1 DIABETES MELLITUS WITHOUT COMPLICATION (HCC): ICD-10-CM

## 2022-03-24 DIAGNOSIS — K21.00 GASTROESOPHAGEAL REFLUX DISEASE WITH ESOPHAGITIS WITHOUT HEMORRHAGE: Primary | ICD-10-CM

## 2022-03-24 DIAGNOSIS — E03.9 HYPOTHYROIDISM, UNSPECIFIED TYPE: ICD-10-CM

## 2022-03-24 PROCEDURE — 3008F BODY MASS INDEX DOCD: CPT | Performed by: FAMILY MEDICINE

## 2022-03-24 PROCEDURE — 3077F SYST BP >= 140 MM HG: CPT | Performed by: FAMILY MEDICINE

## 2022-03-24 PROCEDURE — 1036F TOBACCO NON-USER: CPT | Performed by: FAMILY MEDICINE

## 2022-03-24 PROCEDURE — 99214 OFFICE O/P EST MOD 30 MIN: CPT | Performed by: FAMILY MEDICINE

## 2022-03-24 PROCEDURE — 3080F DIAST BP >= 90 MM HG: CPT | Performed by: FAMILY MEDICINE

## 2022-03-24 RX ORDER — OMEPRAZOLE 40 MG/1
40 CAPSULE, DELAYED RELEASE ORAL DAILY
Qty: 30 CAPSULE | Refills: 1 | Status: SHIPPED | OUTPATIENT
Start: 2022-03-24 | End: 2022-04-07 | Stop reason: SDUPTHER

## 2022-03-24 RX ORDER — SUCRALFATE 1 G/1
1 TABLET ORAL 4 TIMES DAILY
Qty: 40 TABLET | Refills: 0 | Status: SHIPPED | OUTPATIENT
Start: 2022-03-24 | End: 2022-07-05

## 2022-03-24 RX ORDER — LEVOTHYROXINE SODIUM 112 UG/1
TABLET ORAL
COMMUNITY
Start: 2022-01-01 | End: 2022-08-02 | Stop reason: DRUGHIGH

## 2022-03-24 NOTE — PATIENT INSTRUCTIONS
- CONTINUE CURRENT TREATMENT PLAN  - MEDICATION AS PRESCRIBED  - AVOID CAFFEINE, ALCOHOL OR SMOKING  - RV 6 WEEKS  - CALL Monday WITH UPDATE

## 2022-03-24 NOTE — PROGRESS NOTES
BMI Counseling: Body mass index is 38 28 kg/m²  The BMI is above normal  Nutrition recommendations include encouraging healthy choices of fruits and vegetables and moderation in carbohydrate intake  Exercise recommendations include exercising 3-5 times per week  No pharmacotherapy was ordered  Rationale for BMI follow-up plan is due to patient being overweight or obese  Assessment/Plan:    Hypothyroidism  STABLE      Type 1 diabetes mellitus without complication (HCC)    Lab Results   Component Value Date    HGBA1C 7 6 01/15/2022       STABLE       Diagnoses and all orders for this visit:    Gastroesophageal reflux disease with esophagitis without hemorrhage  -     sucralfate (CARAFATE) 1 g tablet; Take 1 tablet (1 g total) by mouth 4 (four) times a day  -     omeprazole (PriLOSEC) 40 MG capsule; Take 1 capsule (40 mg total) by mouth daily    Type 1 diabetes mellitus without complication (HCC)    Hypothyroidism, unspecified type    Other orders  -     levothyroxine 112 mcg tablet          Patient Instructions       - CONTINUE CURRENT TREATMENT PLAN  - MEDICATION AS PRESCRIBED  - AVOID CAFFEINE, ALCOHOL OR SMOKING  - RV 6 WEEKS  - CALL Monday WITH UPDATE        Return in about 6 weeks (around 5/5/2022) for Recheck  Subjective:      Patient ID: Rosa Walsh is a 46 y o  female  Chief Complaint   Patient presents with    Heartburn     Pt c/o acid reflux for the plast five days  Heartburn  She complains of abdominal pain, belching, early satiety, globus sensation and heartburn  She reports no chest pain, no choking, no coughing, no dysphagia, no hoarse voice, no nausea, no sore throat or no wheezing  This is a new problem  The current episode started 1 to 4 weeks ago  The problem occurs constantly  The problem has been gradually worsening  The heartburn duration is several minutes  The heartburn is located in the substernum  The heartburn is of moderate intensity   The heartburn does not wake her from sleep  The heartburn does not limit her activity  The heartburn doesn't change with position  The symptoms are aggravated by certain foods  Associated symptoms include fatigue  Risk factors include obesity and NSAIDs  She has tried an antacid for the symptoms  The treatment provided mild relief  The following portions of the patient's history were reviewed and updated as appropriate: allergies, current medications, past family history, past medical history, past social history, past surgical history and problem list     Review of Systems   Constitutional: Positive for fatigue  Negative for chills and fever  HENT: Negative for congestion, ear discharge, ear pain, hoarse voice, mouth sores, postnasal drip, sore throat and trouble swallowing  Eyes: Negative for pain, discharge and visual disturbance  Respiratory: Negative for cough, choking, shortness of breath and wheezing  Cardiovascular: Negative for chest pain, palpitations and leg swelling  Gastrointestinal: Positive for abdominal pain and heartburn  Negative for abdominal distention, blood in stool, diarrhea, dysphagia and nausea  Endocrine: Negative for polydipsia, polyphagia and polyuria  Genitourinary: Negative for dysuria, frequency, hematuria and urgency  Musculoskeletal: Negative for arthralgias, gait problem and joint swelling  Skin: Negative for pallor and rash  Neurological: Negative for dizziness, syncope, speech difficulty, weakness, light-headedness, numbness and headaches  Hematological: Negative for adenopathy  Psychiatric/Behavioral: Negative for behavioral problems, confusion and sleep disturbance  The patient is not nervous/anxious            Current Outpatient Medications   Medication Sig Dispense Refill    Accu-Chek FastClix Lancets MISC       atorvastatin (LIPITOR) 10 mg tablet TAKE 1 TABLET BY MOUTH  DAILY 90 tablet 3    BAQSIMI TWO PACK 3 MG/DOSE POWD       Blood Glucose Monitoring Suppl (FREESTYLE FREEDOM LITE) w/Device KIT by Does not apply route      Cholecalciferol (VITAMIN D3 PO) Take 1,000 Int'l Units by mouth      Continuous Blood Gluc  (Dexcom G6 ) SAIMA Use      Continuous Blood Gluc Sensor (Dexcom G6 Sensor) MISC Use      Continuous Blood Gluc Transmit (Dexcom G6 Transmitter) MISC Use      EPINEPHrine (EPIPEN) 0 3 mg/0 3 mL SOAJ INJECT AS NEEDED FOR  ALLERGIC RESPONSE AS  DIRECTED BY YOUR PHYSICIAN 4 each 1    insulin lispro (HUMALOG) 100 units/mL injection Inject 80 Units under the skin        levothyroxine 112 mcg tablet       Multiple Vitamins-Minerals (CENTRUM SILVER 50+WOMEN PO) Take by mouth      Nortrel 7/7/7 0 5/0 75/1-35 MG-MCG per tablet TAKE 1 TABLET BY MOUTH  DAILY 84 tablet 3    omeprazole (PriLOSEC) 40 MG capsule Take 1 capsule (40 mg total) by mouth daily 30 capsule 1    sucralfate (CARAFATE) 1 g tablet Take 1 tablet (1 g total) by mouth 4 (four) times a day 40 tablet 0     No current facility-administered medications for this visit  Objective:    /92 (BP Location: Right arm, Patient Position: Sitting, Cuff Size: Large)   Pulse 84   Temp 97 8 °F (36 6 °C) (Temporal)   Resp 16   Ht 5' (1 524 m)   Wt 88 9 kg (196 lb)   SpO2 98%   BMI 38 28 kg/m²        Physical Exam  Constitutional:       Appearance: She is well-developed  HENT:      Head: Normocephalic and atraumatic  Eyes:      General:         Right eye: No discharge  Left eye: No discharge  Conjunctiva/sclera: Conjunctivae normal       Pupils: Pupils are equal, round, and reactive to light  Neck:      Thyroid: No thyromegaly  Cardiovascular:      Rate and Rhythm: Normal rate and regular rhythm  Heart sounds: Normal heart sounds  No murmur heard  Pulmonary:      Effort: Pulmonary effort is normal  No respiratory distress  Breath sounds: Normal breath sounds  No wheezing or rales     Abdominal:      General: Bowel sounds are normal       Palpations: Abdomen is soft       Tenderness: There is no abdominal tenderness  Comments: MILD EPIGASTRIC DISCOMFORT   Musculoskeletal:         General: No tenderness  Normal range of motion  Cervical back: Normal range of motion and neck supple  Lymphadenopathy:      Cervical: No cervical adenopathy  Skin:     General: Skin is warm and dry  Findings: No erythema or rash  Neurological:      Mental Status: She is alert and oriented to person, place, and time  Psychiatric:         Behavior: Behavior normal          Thought Content:  Thought content normal          Judgment: Judgment normal                 Marlo Whitley MD

## 2022-03-25 ENCOUNTER — TELEPHONE (OUTPATIENT)
Dept: ADMINISTRATIVE | Facility: OTHER | Age: 52
End: 2022-03-25

## 2022-03-25 NOTE — LETTER
Vaccination Request Form: Influenza      Date Requested: 22  Patient: Destini Choe  Patient : 1970   Referring Provider: Melva Oliveira MD       The above patient has informed us that they have had their   most recent Influenza administered at your facility  Please   complete this form and attach all corresponding documentation  Date of Vaccine(s) Given  ______________________________    Lot Number(s) _______________________________________    Manufacture(s) ______________________________________    Dose Amount (s) _____________________________________    Expiration Date(s) ____________________________________    Comments __________________________________________________________  ____________________________________________________________________  ____________________________________________________________________  ____________________________________________________________________    Administering Facility  ________________________________________________    Vaccine Administered By (print name) ___________________________________      Form Completed By (print name) _______________________________________      Signature ___________________________________________________________      These reports are needed for  compliance  Please fax this completed form and a copy of the Vaccine Document(s) to our office located at [place] [address] as soon as possible to [phone] attention Meka: Phone [phone]    We thank you for your assistance in treating our mutual patient     (sent to [facility] [address])

## 2022-03-25 NOTE — TELEPHONE ENCOUNTER
----- Message from Hemalatha Arriaza sent at 3/24/2022  2:55 PM EDT -----  Regarding: care gap request - flu shot  03/24/22 2:55 PM    Hello, our patient attached above has had Immunization(s) completed/performed  Please assist in updating the patient chart by making an External outreach to [facility] facility located in [address]  The date of service is 2021      Thank you,  Hemalatha Arriaza  [address]

## 2022-03-25 NOTE — TELEPHONE ENCOUNTER
Upon review of the In Basket request and the patient's chart, initial outreach has been made via fax, please see Contacts section for details       Thank you  Dulce Thurston MA

## 2022-03-28 ENCOUNTER — TELEPHONE (OUTPATIENT)
Dept: FAMILY MEDICINE CLINIC | Facility: CLINIC | Age: 52
End: 2022-03-28

## 2022-03-30 NOTE — TELEPHONE ENCOUNTER
Upon review of the In Basket request we were able to locate, review, and update the patient chart as requested for Immunization(s) Influenza 3-  Any additional questions or concerns should be emailed to the Practice Liaisons via [email or web address] email, please do not reply via In Basket      Thank you  Tresa Fabian MA

## 2022-03-30 NOTE — TELEPHONE ENCOUNTER
As a follow-up, a second attempt has been made for outreach via telephone call, please see Contacts section for details      Thank you  Yariel Romero MA

## 2022-04-07 ENCOUNTER — TELEPHONE (OUTPATIENT)
Dept: FAMILY MEDICINE CLINIC | Facility: CLINIC | Age: 52
End: 2022-04-07

## 2022-04-07 NOTE — TELEPHONE ENCOUNTER
Does Lauro Rodriguez need to stay on omeprezole 40? Or can she take a lower dose  She states its upsetting her stomach  Having soft stools and takes awhile to urinate    [phone]

## 2022-04-18 ENCOUNTER — TELEPHONE (OUTPATIENT)
Dept: FAMILY MEDICINE CLINIC | Facility: CLINIC | Age: 52
End: 2022-04-18

## 2022-04-18 NOTE — TELEPHONE ENCOUNTER
Update- Lurlene Kanner states she stopped omeprezole 20mg because it was doing the same thing as the 40mg  It caused severe stomach pains, soft stool, frequent bowel movements  Now she states she feels ok and doesn't have burning  She just wanted you to know she stopped    No further action

## 2022-05-03 DIAGNOSIS — Z30.41 ORAL CONTRACEPTIVE PILL SURVEILLANCE: ICD-10-CM

## 2022-05-04 RX ORDER — NORETHINDRONE AND ETHINYL ESTRADIOL 7 DAYS X 3
KIT ORAL
Qty: 84 TABLET | Refills: 3 | Status: SHIPPED | OUTPATIENT
Start: 2022-05-04

## 2022-05-09 ENCOUNTER — OFFICE VISIT (OUTPATIENT)
Dept: ENDOCRINOLOGY | Facility: CLINIC | Age: 52
End: 2022-05-09
Payer: COMMERCIAL

## 2022-05-09 VITALS
HEART RATE: 98 BPM | BODY MASS INDEX: 39.07 KG/M2 | DIASTOLIC BLOOD PRESSURE: 82 MMHG | HEIGHT: 60 IN | WEIGHT: 199 LBS | SYSTOLIC BLOOD PRESSURE: 130 MMHG

## 2022-05-09 DIAGNOSIS — E10.65 TYPE 1 DIABETES MELLITUS WITH HYPERGLYCEMIA (HCC): Primary | ICD-10-CM

## 2022-05-09 DIAGNOSIS — Z96.41 INSULIN PUMP IN PLACE: ICD-10-CM

## 2022-05-09 DIAGNOSIS — E78.2 MIXED HYPERLIPIDEMIA: ICD-10-CM

## 2022-05-09 DIAGNOSIS — E55.9 VITAMIN D DEFICIENCY: ICD-10-CM

## 2022-05-09 DIAGNOSIS — E03.9 ACQUIRED HYPOTHYROIDISM: ICD-10-CM

## 2022-05-09 DIAGNOSIS — E10.65 TYPE 1 DIABETES MELLITUS WITH HYPERGLYCEMIA (HCC): ICD-10-CM

## 2022-05-09 PROCEDURE — 99215 OFFICE O/P EST HI 40 MIN: CPT | Performed by: INTERNAL MEDICINE

## 2022-05-09 PROCEDURE — 3075F SYST BP GE 130 - 139MM HG: CPT | Performed by: INTERNAL MEDICINE

## 2022-05-09 PROCEDURE — 3079F DIAST BP 80-89 MM HG: CPT | Performed by: INTERNAL MEDICINE

## 2022-05-09 PROCEDURE — 1036F TOBACCO NON-USER: CPT | Performed by: INTERNAL MEDICINE

## 2022-05-09 PROCEDURE — 3008F BODY MASS INDEX DOCD: CPT | Performed by: INTERNAL MEDICINE

## 2022-05-09 PROCEDURE — 95251 CONT GLUC MNTR ANALYSIS I&R: CPT | Performed by: INTERNAL MEDICINE

## 2022-05-09 RX ORDER — BLOOD SUGAR DIAGNOSTIC
STRIP MISCELLANEOUS
Qty: 100 STRIP | Refills: 3 | Status: SHIPPED | OUTPATIENT
Start: 2022-05-09 | End: 2022-05-24 | Stop reason: SDUPTHER

## 2022-05-09 RX ORDER — GLUCAGON 3 MG/1
POWDER NASAL
Qty: 1 EACH | Refills: 3 | Status: SHIPPED | OUTPATIENT
Start: 2022-05-09

## 2022-05-09 NOTE — PATIENT INSTRUCTIONS
Please have labs done as ordered   Plan for review of blood sugars in a few weeks if you notice persistent high or low blood sugar

## 2022-05-09 NOTE — PROGRESS NOTES
Gerardo Wolf 45 y o  female MRN: [id number]    Encounter: [id number]  Referring Provider  Juan J Dwyer Md  [address] [facility],  [address] [facility]    Assessment/Plan     1  Type 1 diabetes mellitus long-term insulin therapy with hyperglycemia  2  Insulin pump  3  Obesity    Recommend the following at this time  changed pump setting as follows:   Change active insulin time to 4 hrs                                               Time Rate   12 am  1 4    3 am 1 9    8 am 1 8    7 pm  1 8    8 pm 1 8     Time Insulin:Carb Ratio Insulin Sensitivity Factor   12A  1:5   1:30  -- > 1:20      3 am 1:3      8 am 1 :5      7 pm 1:5      8 pm 1:5      - check labs        - Recommended a consistent carbohydrate diet   - weight control and exercise as discussed ( ideal is 30 min, at least 5 times a week)   - home glucose monitoring and goals emphasized, requested patient bring in glucose monitor or log sheets to next visit   - discussed signs and symptoms of hypoglycemia and how to correct them appropriately  - counseled about the long term complications of uncontrolled diabetes, including, Nephropathy, Neuropathy, CVD, Retinopathy and importance  of adherence to diet, treatment plan and life style modifications   - importance of following up with Opthalmology and podiatry   - glycohemoglobin and other lab monitoring discussed, A1c goal value reviewed  - long term diabetic complications discussed  - labs immediately prior to next visit     4  Hyperlipidemia  - continue statin therapy    5   hypothyroidism-continue current dose of levothyroxine   Check TSH, free T4       CC: Diabetes    History of Present Illness     HPI:  Gerardo Wolf is a 45 y o  female presents for a new visit regarding diabetes management    Also has history of hypothyroidism, vitamin-D deficiency, hyperlipidemia    DM history:   Diagnosed in 2013  - presented with DKA  Paternal GF with h/o type 2 diabetes mellitus   No known complications of CAD/ CVA/CKD  Last Eye exam: 2021- No DR     Diagnosed with hypothyroidism since 25 years   Levothyroxine 112 mcg orally daily   c/o fatigue, would like to loose weight   No other symptoms of hypo or hyperthyroidism    Takes Vitamin D3 1000 IU daily  Lipitor 10 mg     Has had a pump since   Does not trust  Control/ Basal IQ   Also says that the dexcom often gives a higher BG reading than the fingerstick and checked fingerstick BG approx 4 times a day     Current regimen:  Insulin pump type: tandem  Insulin used:  humalog  Active insulin time: 5 hrs  basal                                                               Time Rate   12 am  1 4    3 am 1 9    8 am 1 8    7 pm  1 8    8 pm 1 8     Time Insulin:Carb Ratio Insulin Sensitivity Factor   12A  1:5   1:30      3 am 1:3      8 am 1 :5      7 pm 1:5      8 om 1:5       Target B   Average daily insulin: 82 units  51 % basal 42   40 % bolus 32     Appetite is good  Denies recent weight gain/ loss  Denies numbness or tingling in the hands or feet  Denies changes in vision  No known retinopathy  No chest pain/ SOB  No diarrhea/ constipation  No urinary complaints  Exercise:  Walks everyday  15-30 min    Home glucose monitoring:  CGM interpretation   Dexcom   Time percentage CGM worn 93 %   Time in range 60 (goal >65-70%)  High  31%  Very high -9%  Low 0  Very low 0   SD 58  (goal <50)     Average glucose 176 mg/dL  GMI 7 5 %     Is on oral contraceptive pills, history of migraines, worse around periods  Wonders if she could be perimenopausal/menopausal   Advised to in consult with OBGYN     All other systems were reviewed and are negative      Review of Systems      Historical Information   Past Medical History:   Diagnosis Date    COVID-19 virus infection 2020    Diabetes ([facility] [address] )     Disease of thyroid gland     Hyperlipidemia     last assessed 5/11/15     Past Surgical History:   Procedure Laterality Date    APPENDECTOMY      CARPAL TUNNEL RELEASE Bilateral     CYST REMOVAL      hand, tendon cyst    GANGLION CYST EXCISION      TONSILLECTOMY      WISDOM TOOTH EXTRACTION       Social History   Social History     Substance and Sexual Activity   Alcohol Use No     Social History     Substance and Sexual Activity   Drug Use No     Social History     Tobacco Use   Smoking Status Former Smoker    Start date: 3/29/2013   Smokeless Tobacco Never Used     Family History:   Family History   Problem Relation Age of Onset    No Known Problems Mother     Hypertension Father     Cancer Maternal Grandmother     Liver cancer Maternal Grandmother     No Known Problems Sister     No Known Problems Brother     No Known Problems Maternal Aunt     No Known Problems Maternal Uncle     No Known Problems Paternal Aunt     No Known Problems Paternal Uncle     No Known Problems Maternal Grandfather     No Known Problems Paternal Grandmother     Diabetes Paternal Grandfather     Mental illness Neg Hx     Substance Abuse Neg Hx        Meds/Allergies   Current Outpatient Medications   Medication Sig Dispense Refill    Accu-Chek FastClix Lancets MISC       atorvastatin (LIPITOR) 10 mg tablet TAKE 1 TABLET BY MOUTH  DAILY 90 tablet 3    BAQSIMI TWO PACK 3 MG/DOSE POWD       Blood Glucose Monitoring Suppl (FREESTYLE FREEDOM LITE) w/Device KIT by Does not apply route      Cholecalciferol (VITAMIN D3 PO) Take 1,000 Int'l Units by mouth      Continuous Blood Gluc  (Dexcom G6 ) SAIMA Use      Continuous Blood Gluc Sensor (Dexcom G6 Sensor) MISC Use      Continuous Blood Gluc Transmit (Dexcom G6 Transmitter) MISC Use      EPINEPHrine (EPIPEN) 0 3 mg/0 3 mL SOAJ INJECT AS NEEDED FOR  ALLERGIC RESPONSE AS  DIRECTED BY YOUR PHYSICIAN 4 each 1    insulin lispro (HUMALOG) 100 units/mL injection Inject 80 Units under the skin        levothyroxine 112 mcg tablet       Multiple Vitamins-Minerals (CENTRUM SILVER 50+WOMEN PO) Take by mouth      Nortrel 7/7/7 0 5/0 75/1-35 MG-MCG per tablet TAKE 1 TABLET BY MOUTH  DAILY 84 tablet 3    omeprazole (PriLOSEC) 20 mg delayed release capsule Take 1 capsule (20 mg total) by mouth daily (Patient not taking: Reported on 5/9/2022 ) 90 capsule 1    sucralfate (CARAFATE) 1 g tablet Take 1 tablet (1 g total) by mouth 4 (four) times a day (Patient not taking: Reported on 5/9/2022 ) 40 tablet 0     No current facility-administered medications for this visit  Allergies   Allergen Reactions    Penicillins     Sulfites - Food Allergy        Objective   Vitals: Blood pressure 130/82, pulse 98, height 5' (1 524 m), weight 90 3 kg (199 lb)  Physical Exam  Constitutional:       Appearance: She is well-developed  HENT:      Head: Normocephalic and atraumatic  Eyes:      Conjunctiva/sclera: Conjunctivae normal       Pupils: Pupils are equal, round, and reactive to light  Neck:      Thyroid: No thyromegaly  Cardiovascular:      Rate and Rhythm: Normal rate and regular rhythm  Pulses: no weak pulses          Dorsalis pedis pulses are 2+ on the right side and 2+ on the left side  Heart sounds: Normal heart sounds  No murmur heard  Pulmonary:      Effort: Pulmonary effort is normal       Breath sounds: Normal breath sounds  No wheezing  Abdominal:      General: There is no distension  Palpations: Abdomen is soft  Tenderness: There is no abdominal tenderness  Musculoskeletal:         General: Normal range of motion  Cervical back: Normal range of motion and neck supple  Feet:      Right foot:      Skin integrity: No ulcer, skin breakdown, erythema, warmth, callus or dry skin  Left foot:      Skin integrity: No ulcer, skin breakdown, erythema, warmth, callus or dry skin  Skin:     General: Skin is warm and dry  Neurological:      Mental Status: She is alert and oriented to person, place, and time     Psychiatric:         Behavior: Behavior normal      Diabetic Foot Exam    Patient's shoes and socks removed  Right Foot/Ankle   Right Foot Inspection  Skin Exam: skin normal and skin intact  No dry skin, no warmth, no callus, no erythema, no maceration, no abnormal color, no pre-ulcer, no ulcer and no callus  Sensory   Vibration: intact  Proprioception: intact  Monofilament testing: intact    Vascular  Capillary refills: < 3 seconds  The right DP pulse is 2+  Left Foot/Ankle  Left Foot Inspection  Skin Exam: skin normal and skin intact  No dry skin, no warmth, no erythema, no maceration, normal color, no pre-ulcer, no ulcer and no callus  Sensory   Vibration: intact  Proprioception: intact  Monofilament testing: intact    Vascular  Capillary refills: < 3 seconds  The left DP pulse is 2+  Assign Risk Category  No deformity present  No loss of protective sensation  No weak pulses  Risk: 0    The history was obtained from the review of the chart, patient  Lab Results:   Lab Results   Component Value Date/Time    Hemoglobin A1C 7 6 01/15/2022 12:00 AM    Hemoglobin A1C 7 9 07/17/2021 12:00 AM           Imaging Studies: I have personally reviewed pertinent reports  Portions of the record may have been created with voice recognition software  Occasional wrong word or "sound a like" substitutions may have occurred due to the inherent limitations of voice recognition software  Read the chart carefully and recognize, using context, where substitutions have occurred

## 2022-05-24 DIAGNOSIS — E10.65 TYPE 1 DIABETES MELLITUS WITH HYPERGLYCEMIA (HCC): ICD-10-CM

## 2022-05-24 RX ORDER — BLOOD SUGAR DIAGNOSTIC
STRIP MISCELLANEOUS
Qty: 400 STRIP | Refills: 3 | Status: SHIPPED | OUTPATIENT
Start: 2022-05-24

## 2022-07-05 ENCOUNTER — OFFICE VISIT (OUTPATIENT)
Dept: FAMILY MEDICINE CLINIC | Facility: CLINIC | Age: 52
End: 2022-07-05
Payer: COMMERCIAL

## 2022-07-05 VITALS
BODY MASS INDEX: 38.68 KG/M2 | WEIGHT: 197 LBS | OXYGEN SATURATION: 98 % | SYSTOLIC BLOOD PRESSURE: 122 MMHG | HEIGHT: 60 IN | RESPIRATION RATE: 16 BRPM | DIASTOLIC BLOOD PRESSURE: 80 MMHG | HEART RATE: 81 BPM | TEMPERATURE: 97.2 F

## 2022-07-05 DIAGNOSIS — Z12.11 SCREENING FOR COLON CANCER: ICD-10-CM

## 2022-07-05 DIAGNOSIS — Z23 NEED FOR SHINGLES VACCINE: ICD-10-CM

## 2022-07-05 DIAGNOSIS — E10.9 TYPE 1 DIABETES MELLITUS WITHOUT COMPLICATION (HCC): ICD-10-CM

## 2022-07-05 DIAGNOSIS — Z91.09 ENVIRONMENTAL ALLERGIES: ICD-10-CM

## 2022-07-05 DIAGNOSIS — E78.2 MIXED HYPERLIPIDEMIA: ICD-10-CM

## 2022-07-05 DIAGNOSIS — Z00.00 ENCOUNTER FOR ANNUAL GENERAL MEDICAL EXAMINATION WITHOUT ABNORMAL FINDINGS IN ADULT: Primary | ICD-10-CM

## 2022-07-05 DIAGNOSIS — E66.01 CLASS 2 SEVERE OBESITY DUE TO EXCESS CALORIES WITH SERIOUS COMORBIDITY AND BODY MASS INDEX (BMI) OF 37.0 TO 37.9 IN ADULT (HCC): ICD-10-CM

## 2022-07-05 DIAGNOSIS — G43.019 INTRACTABLE MIGRAINE WITHOUT AURA AND WITHOUT STATUS MIGRAINOSUS: ICD-10-CM

## 2022-07-05 DIAGNOSIS — E03.9 HYPOTHYROIDISM, UNSPECIFIED TYPE: ICD-10-CM

## 2022-07-05 DIAGNOSIS — Z11.59 NEED FOR HEPATITIS C SCREENING TEST: ICD-10-CM

## 2022-07-05 PROBLEM — Z12.31 ENCOUNTER FOR SCREENING MAMMOGRAM FOR BREAST CANCER: Status: RESOLVED | Noted: 2018-11-13 | Resolved: 2022-07-05

## 2022-07-05 PROBLEM — Z13.6 SCREENING FOR HYPERTENSION: Status: RESOLVED | Noted: 2018-11-13 | Resolved: 2022-07-05

## 2022-07-05 LAB — SL AMB POCT HEMOGLOBIN AIC: 6.9 (ref ?–6.5)

## 2022-07-05 PROCEDURE — 3725F SCREEN DEPRESSION PERFORMED: CPT | Performed by: NURSE PRACTITIONER

## 2022-07-05 PROCEDURE — 99396 PREV VISIT EST AGE 40-64: CPT | Performed by: NURSE PRACTITIONER

## 2022-07-05 PROCEDURE — 90750 HZV VACC RECOMBINANT IM: CPT

## 2022-07-05 PROCEDURE — 83036 HEMOGLOBIN GLYCOSYLATED A1C: CPT | Performed by: NURSE PRACTITIONER

## 2022-07-05 PROCEDURE — 3079F DIAST BP 80-89 MM HG: CPT | Performed by: NURSE PRACTITIONER

## 2022-07-05 PROCEDURE — 3044F HG A1C LEVEL LT 7.0%: CPT | Performed by: NURSE PRACTITIONER

## 2022-07-05 PROCEDURE — 90471 IMMUNIZATION ADMIN: CPT

## 2022-07-05 PROCEDURE — 3074F SYST BP LT 130 MM HG: CPT | Performed by: NURSE PRACTITIONER

## 2022-07-05 RX ORDER — MONTELUKAST SODIUM 10 MG/1
10 TABLET ORAL
Qty: 90 TABLET | Refills: 0 | Status: SHIPPED | OUTPATIENT
Start: 2022-07-05

## 2022-07-05 NOTE — ASSESSMENT & PLAN NOTE
Pt has been taking birth control to predict her migraines - reports they occur during her placebo pill week and are debilitating  Advise that she DC contraception and monitor for improvement  Monitor for menopause/clark-menopausal symptoms  If symptoms persist, consult neuro

## 2022-07-05 NOTE — PROGRESS NOTES
FAMILY PRACTICE HEALTH MAINTENANCE OFFICE VISIT  [facility] - [address] PHYSICIANS    NAME: Mauricio Rogers  AGE: 45 y o  SEX: female  : 1970     DATE: 2022    Assessment and Plan     1  Encounter for annual general medical examination without abnormal findings in adult  Comments:  Age appropriate screenings and recommendations discussed  2  Type 1 diabetes mellitus without complication ([address] )  Assessment & Plan:  Doing well overall  Pt following up with through endocrinology  Stable, no changes  Lab Results   Component Value Date    HGBA1C 6 9 (A) 2022       Orders:  -     POCT hemoglobin A1c  -     Ambulatory Referral to Weight Management; Future  -     Ambulatory Referral to Diabetic Education; Future    3  Mixed hyperlipidemia  Assessment & Plan:  Encourage nutrition and exercise  Recheck lipid panel  RX ordered through endocrinology, Dr Francesco Pennington  4  Hypothyroidism, unspecified type  Assessment & Plan:  Taking levothyroxine 112 mcg daily  Recheck thyroid - rx through endo  5  Class 2 severe obesity due to excess calories with serious comorbidity and body mass index (BMI) of 37 0 to 37 9 in adult [facility])  -     Ambulatory Referral to Weight Management; Future  -     Ambulatory Referral to Diabetic Education; Future    6  Intractable migraine without aura and without status migrainosus  Assessment & Plan:  Pt has been taking birth control to predict her migraines - reports they occur during her placebo pill week and are debilitating  Advise that she DC contraception and monitor for improvement  Monitor for menopause/clark-menopausal symptoms  If symptoms persist, consult neuro  7  Environmental allergies  -     montelukast (SINGULAIR) 10 mg tablet; Take 1 tablet (10 mg total) by mouth daily at bedtime    8  Need for hepatitis C screening test  -     Hepatitis C Antibody ([facility]);  Future  -     Hepatitis C Antibody ([facility])    9  Need for shingles vaccine  -     Zoster Vaccine Recombinant IM    10  Screening for colon cancer  -     Cologuard      · Patient Counseling:   · Nutrition: Stressed importance of a well balanced diet, moderation of sodium/saturated fat, caloric balance and sufficient intake of fiber  · Exercise: Stressed the importance of regular exercise with a goal of 150 minutes per week  · Dental Health: Discussed daily flossing and brushing and regular dental visits   · Alcohol Use:  Recommended moderation of alcohol intake  · Injury Prevention: Discussed Safety Belts, Safety Helmets, and Smoke Detectors    · Immunizations reviewed: See Orders and Risks and Benefits discussed  · Discussed benefits of:  Colon Cancer Screening, Mammogram , Cervical Cancer screening and Screening labs   BMI Counseling: Body mass index is 38 47 kg/m²  Discussed with patient's BMI with her  The BMI is above normal  Nutrition recommendations include 3-5 servings of fruits/vegetables daily  Exercise recommendations include exercising 3-5 times per week  Return in about 6 months (around 1/5/2023) for Diabetic Follow Up          Chief Complaint     Chief Complaint   Patient presents with    Physical Exam       History of Present Illness     HPI    Well Adult Physical   Patient here for a comprehensive physical exam       Diet and Physical Activity  Diet: well balanced diet  Exercise: occasionally      Depression Screen  PHQ-2/9 Depression Screening    Little interest or pleasure in doing things: 0 - not at all  Feeling down, depressed, or hopeless: 0 - not at all  PHQ-2 Score: 0  PHQ-2 Interpretation: Negative depression screen          General Health  Hearing: Normal:  bilateral  Vision: goes for regular eye exams  Dental: regular dental visits    Reproductive Health  No issues       The following portions of the patient's history were reviewed and updated as appropriate: allergies, current medications, past family history, past medical history, past social history, past surgical history and problem list     Review of Systems     Review of Systems   Constitutional: Negative for diaphoresis, fatigue and fever  HENT: Positive for rhinorrhea (chronic)  Negative for ear pain and hearing loss  Eyes: Positive for itching (chronic, allergies)  Negative for pain and visual disturbance  Respiratory: Negative for chest tightness and shortness of breath  Cardiovascular: Negative for chest pain, palpitations and leg swelling  Gastrointestinal: Negative for abdominal pain, constipation and diarrhea  Genitourinary: Negative for difficulty urinating  Musculoskeletal: Negative for arthralgias and myalgias  Skin: Negative for rash  Allergic/Immunologic: Positive for environmental allergies  Neurological: Positive for headaches (chronic, monthly migraines)  Negative for dizziness and numbness  Psychiatric/Behavioral: Negative for sleep disturbance         Past Medical History     Past Medical History:   Diagnosis Date    COVID-19 virus infection 11/14/2020    Diabetes ([facility] [address] )     Disease of thyroid gland     Hyperlipidemia     last assessed 5/11/15       Past Surgical History     Past Surgical History:   Procedure Laterality Date    APPENDECTOMY      CARPAL TUNNEL RELEASE Bilateral     CYST REMOVAL      hand, tendon cyst    GANGLION CYST EXCISION      TONSILLECTOMY      WISDOM TOOTH EXTRACTION         Social History     Social History     Socioeconomic History    Marital status: Single     Spouse name: None    Number of children: None    Years of education: None    Highest education level: None   Occupational History    None   Tobacco Use    Smoking status: Former Smoker     Start date: 3/29/2013    Smokeless tobacco: Never Used   Vaping Use    Vaping Use: Never used   Substance and Sexual Activity    Alcohol use: No    Drug use: No    Sexual activity: None   Other Topics Concern    None   Social History Narrative    Daily caffeine consumption;2-3 serving a day    Dental care occasionally     Social Determinants of Health     Financial Resource Strain: Not on file   Food Insecurity: Not on file   Transportation Needs: Not on file   Physical Activity: Not on file   Stress: Not on file   Social Connections: Not on file   Intimate Partner Violence: Not on file   Housing Stability: Not on file       Family History     Family History   Problem Relation Age of Onset    No Known Problems Mother     Hypertension Father     Cancer Maternal Grandmother     Liver cancer Maternal Grandmother     No Known Problems Sister     No Known Problems Brother     No Known Problems Maternal Aunt     No Known Problems Maternal Uncle     No Known Problems Paternal Aunt     No Known Problems Paternal Uncle     No Known Problems Maternal Grandfather     No Known Problems Paternal Grandmother     Diabetes Paternal Grandfather     Mental illness Neg Hx     Substance Abuse Neg Hx        Current Medications       Current Outpatient Medications:     Accu-Chek FastClix Lancets MISC, , Disp: , Rfl:     atorvastatin (LIPITOR) 10 mg tablet, TAKE 1 TABLET BY MOUTH  DAILY, Disp: 90 tablet, Rfl: 3    Baqsimi Two Pack 3 MG/DOSE POWD, As needed for hypoglycemia, Disp: 1 each, Rfl: 3    Cholecalciferol (VITAMIN D3 PO), Take 1,000 Int'l Units by mouth, Disp: , Rfl:     Continuous Blood Gluc  (Dexcom G6 ) SAIMA, Use, Disp: , Rfl:     Continuous Blood Gluc Sensor (Dexcom G6 Sensor) MISC, Use, Disp: , Rfl:     Continuous Blood Gluc Transmit (Dexcom G6 Transmitter) MISC, Use, Disp: , Rfl:     EPINEPHrine (EPIPEN) 0 3 mg/0 3 mL SOAJ, INJECT AS NEEDED FOR  ALLERGIC RESPONSE AS  DIRECTED BY YOUR PHYSICIAN, Disp: 4 each, Rfl: 1    glucose blood (Contour Next Test) test strip, Use to test blood sugar 4 times a day, Disp: 400 strip, Rfl: 3    insulin lispro (HumaLOG) 100 units/mL injection, Inject 80 Units under the skin  , Disp: , Rfl:    levothyroxine 112 mcg tablet, , Disp: , Rfl:     montelukast (SINGULAIR) 10 mg tablet, Take 1 tablet (10 mg total) by mouth daily at bedtime, Disp: 90 tablet, Rfl: 0    Multiple Vitamins-Minerals (CENTRUM SILVER 50+WOMEN PO), Take by mouth, Disp: , Rfl:     Nortrel 7/7/7 0 5/0 75/1-35 MG-MCG per tablet, TAKE 1 TABLET BY MOUTH  DAILY, Disp: 84 tablet, Rfl: 3     Allergies     Allergies   Allergen Reactions    Penicillins     Sulfites - Food Allergy        Objective     /80   Pulse 81   Temp (!) 97 2 °F (36 2 °C) (Temporal)   Resp 16   Ht 5' (1 524 m)   Wt 89 4 kg (197 lb)   SpO2 98%   BMI 38 47 kg/m²      Physical Exam  Vitals reviewed  Constitutional:       General: She is not in acute distress  Appearance: Normal appearance  She is well-developed  She is not diaphoretic  HENT:      Head: Normocephalic and atraumatic  Right Ear: Tympanic membrane, ear canal and external ear normal       Left Ear: Tympanic membrane, ear canal and external ear normal    Eyes:      General: Lids are normal       Extraocular Movements: Extraocular movements intact  Conjunctiva/sclera: Conjunctivae normal       Pupils: Pupils are equal, round, and reactive to light  Funduscopic exam:     Right eye: No hemorrhage or exudate  Red reflex present  Left eye: No hemorrhage or exudate  Red reflex present  Neck:      Thyroid: No thyroid mass or thyromegaly  Vascular: No carotid bruit  Cardiovascular:      Rate and Rhythm: Normal rate and regular rhythm  Pulses: Normal pulses  no weak pulses          Dorsalis pedis pulses are 2+ on the right side and 2+ on the left side  Posterior tibial pulses are 2+ on the right side and 2+ on the left side  Heart sounds: Normal heart sounds, S1 normal and S2 normal  No murmur heard  Pulmonary:      Effort: Pulmonary effort is normal       Breath sounds: Normal breath sounds  No decreased breath sounds, wheezing, rhonchi or rales  Chest:   Breasts:      Right: No supraclavicular adenopathy  Left: No supraclavicular adenopathy  Abdominal:      General: Bowel sounds are normal  There is no distension  Palpations: Abdomen is soft  Tenderness: There is no abdominal tenderness  Musculoskeletal:         General: Normal range of motion  Cervical back: Full passive range of motion without pain, normal range of motion and neck supple  Right lower leg: No edema  Left lower leg: No edema  Feet:      Right foot:      Skin integrity: No ulcer, skin breakdown, erythema, warmth, callus or dry skin  Left foot:      Skin integrity: No ulcer, skin breakdown, erythema, warmth, callus or dry skin  Lymphadenopathy:      Cervical: No cervical adenopathy  Upper Body:      Right upper body: No supraclavicular adenopathy  Left upper body: No supraclavicular adenopathy  Skin:     General: Skin is warm and dry  Findings: No rash  Neurological:      Mental Status: She is alert and oriented to person, place, and time  Cranial Nerves: No cranial nerve deficit  Sensory: No sensory deficit  Coordination: Coordination normal       Deep Tendon Reflexes: Reflexes are normal and symmetric  Psychiatric:         Speech: Speech normal          Behavior: Behavior normal          Thought Content: Thought content normal          Judgment: Judgment normal            Patient's shoes and socks removed  Right Foot/Ankle   Right Foot Inspection  Skin Exam: skin normal and skin intact  No dry skin, no warmth, no callus, no erythema, no maceration, no abnormal color, no pre-ulcer, no ulcer and no callus  Toe Exam: ROM and strength within normal limits  Sensory   Vibration: intact  Proprioception: intact  Monofilament testing: intact    Vascular  Capillary refills: < 3 seconds  The right DP pulse is 2+  The right PT pulse is 2+       Left Foot/Ankle  Left Foot Inspection  Skin Exam: skin normal and skin intact  No dry skin, no warmth, no erythema, no maceration, normal color, no pre-ulcer, no ulcer and no callus  Toe Exam: ROM and strength within normal limits  Sensory   Vibration: intact  Proprioception: intact  Monofilament testing: intact    Vascular  Capillary refills: < 3 seconds  The left DP pulse is 2+  The left PT pulse is 2+       Assign Risk Category  No deformity present  No loss of protective sensation  No weak pulses  Risk: 0        Lopez Josue, [address] [facility]

## 2022-07-05 NOTE — ASSESSMENT & PLAN NOTE
Doing well overall  Pt following up with through endocrinology  Stable, no changes       Lab Results   Component Value Date    HGBA1C 6 9 (A) 07/05/2022

## 2022-07-05 NOTE — ASSESSMENT & PLAN NOTE
Encourage nutrition and exercise  Recheck lipid panel  RX ordered through endocrinology, Dr Hernández [place]

## 2022-07-22 ENCOUNTER — VBI (OUTPATIENT)
Dept: ADMINISTRATIVE | Facility: OTHER | Age: 52
End: 2022-07-22

## 2022-07-24 LAB
25(OH)D3+25(OH)D2 SERPL-MCNC: 29.1 NG/ML (ref 30–100)
ALBUMIN SERPL-MCNC: 4.4 G/DL (ref 3.8–4.9)
ALBUMIN/CREAT UR: 13 MG/G CREAT (ref 0–29)
ALBUMIN/GLOB SERPL: 1.8 {RATIO} (ref 1.2–2.2)
ALP SERPL-CCNC: 98 IU/L (ref 44–121)
ALT SERPL-CCNC: 12 IU/L (ref 0–32)
AST SERPL-CCNC: 15 IU/L (ref 0–40)
BASOPHILS # BLD AUTO: 0.1 X10E3/UL (ref 0–0.2)
BASOPHILS NFR BLD AUTO: 1 %
BILIRUB SERPL-MCNC: 0.2 MG/DL (ref 0–1.2)
BUN SERPL-MCNC: 12 MG/DL (ref 6–24)
BUN/CREAT SERPL: 16 (ref 9–23)
CALCIUM SERPL-MCNC: 9.2 MG/DL (ref 8.7–10.2)
CHLORIDE SERPL-SCNC: 100 MMOL/L (ref 96–106)
CHOLEST SERPL-MCNC: 174 MG/DL (ref 100–199)
CHOLEST/HDLC SERPL: 3.3 RATIO (ref 0–4.4)
CO2 SERPL-SCNC: 20 MMOL/L (ref 20–29)
CREAT SERPL-MCNC: 0.73 MG/DL (ref 0.57–1)
CREAT UR-MCNC: 39 MG/DL
EGFR: 98 ML/MIN/1.73
EOSINOPHIL # BLD AUTO: 0.4 X10E3/UL (ref 0–0.4)
EOSINOPHIL NFR BLD AUTO: 3 %
ERYTHROCYTE [DISTWIDTH] IN BLOOD BY AUTOMATED COUNT: 11.8 % (ref 11.7–15.4)
GLOBULIN SER-MCNC: 2.4 G/DL (ref 1.5–4.5)
GLUCOSE SERPL-MCNC: 167 MG/DL (ref 65–99)
HCT VFR BLD AUTO: 37.6 % (ref 34–46.6)
HCV AB S/CO SERPL IA: <0.1 S/CO RATIO (ref 0–0.9)
HDLC SERPL-MCNC: 53 MG/DL
HGB BLD-MCNC: 12.3 G/DL (ref 11.1–15.9)
IMM GRANULOCYTES # BLD: 0.1 X10E3/UL (ref 0–0.1)
IMM GRANULOCYTES NFR BLD: 1 %
LDLC SERPL CALC-MCNC: 78 MG/DL (ref 0–99)
LDLC SERPL DIRECT ASSAY-MCNC: 70 MG/DL (ref 0–99)
LYMPHOCYTES # BLD AUTO: 3.3 X10E3/UL (ref 0.7–3.1)
LYMPHOCYTES NFR BLD AUTO: 26 %
MCH RBC QN AUTO: 30.5 PG (ref 26.6–33)
MCHC RBC AUTO-ENTMCNC: 32.7 G/DL (ref 31.5–35.7)
MCV RBC AUTO: 93 FL (ref 79–97)
MICROALBUMIN UR-MCNC: 5.1 UG/ML
MONOCYTES # BLD AUTO: 0.6 X10E3/UL (ref 0.1–0.9)
MONOCYTES NFR BLD AUTO: 5 %
NEUTROPHILS # BLD AUTO: 8.3 X10E3/UL (ref 1.4–7)
NEUTROPHILS NFR BLD AUTO: 64 %
PLATELET # BLD AUTO: 446 X10E3/UL (ref 150–450)
POTASSIUM SERPL-SCNC: 4.6 MMOL/L (ref 3.5–5.2)
PROT SERPL-MCNC: 6.8 G/DL (ref 6–8.5)
RBC # BLD AUTO: 4.03 X10E6/UL (ref 3.77–5.28)
SL AMB VLDL CHOLESTEROL CALC: 43 MG/DL (ref 5–40)
SODIUM SERPL-SCNC: 135 MMOL/L (ref 134–144)
T4 FREE SERPL-MCNC: 1.35 NG/DL (ref 0.82–1.77)
TRIGL SERPL-MCNC: 265 MG/DL (ref 0–149)
TSH SERPL DL<=0.005 MIU/L-ACNC: 7.79 UIU/ML (ref 0.45–4.5)
WBC # BLD AUTO: 12.8 X10E3/UL (ref 3.4–10.8)

## 2022-07-24 PROCEDURE — 3061F NEG MICROALBUMINURIA REV: CPT | Performed by: NURSE PRACTITIONER

## 2022-08-02 DIAGNOSIS — E03.9 ACQUIRED HYPOTHYROIDISM: Primary | ICD-10-CM

## 2022-08-02 DIAGNOSIS — E03.9 ACQUIRED HYPOTHYROIDISM: ICD-10-CM

## 2022-08-02 DIAGNOSIS — E10.65 TYPE 1 DIABETES MELLITUS WITH HYPERGLYCEMIA (HCC): Primary | ICD-10-CM

## 2022-08-02 RX ORDER — LEVOTHYROXINE SODIUM 0.12 MG/1
125 TABLET ORAL DAILY
Qty: 90 TABLET | Refills: 3 | Status: SHIPPED | OUTPATIENT
Start: 2022-08-02

## 2022-08-12 DIAGNOSIS — T78.40XD ALLERGY, SUBSEQUENT ENCOUNTER: ICD-10-CM

## 2022-08-12 RX ORDER — EPINEPHRINE 0.3 MG/.3ML
INJECTION SUBCUTANEOUS
Qty: 4 EACH | Refills: 1 | Status: SHIPPED | OUTPATIENT
Start: 2022-08-12 | End: 2022-10-28

## 2022-08-15 DIAGNOSIS — E10.65 TYPE 1 DIABETES MELLITUS WITH HYPERGLYCEMIA (HCC): Primary | ICD-10-CM

## 2022-08-15 RX ORDER — LANCETS
EACH MISCELLANEOUS
Qty: 100 EACH | Refills: 3 | Status: SHIPPED | OUTPATIENT
Start: 2022-08-15

## 2022-08-15 RX ORDER — BLOOD-GLUCOSE TRANSMITTER
EACH MISCELLANEOUS
Qty: 1 EACH | Refills: 3 | Status: SHIPPED | OUTPATIENT
Start: 2022-08-15

## 2022-08-17 LAB — COLOGUARD RESULT REPORTABLE: NORMAL

## 2022-08-18 DIAGNOSIS — Z12.11 SCREENING FOR COLON CANCER: Primary | ICD-10-CM

## 2022-08-26 ENCOUNTER — OFFICE VISIT (OUTPATIENT)
Dept: DIABETES SERVICES | Facility: CLINIC | Age: 52
End: 2022-08-26
Payer: COMMERCIAL

## 2022-08-26 VITALS — WEIGHT: 197 LBS | BODY MASS INDEX: 38.47 KG/M2

## 2022-08-26 DIAGNOSIS — E10.9 TYPE 1 DIABETES MELLITUS WITHOUT COMPLICATION (HCC): Primary | ICD-10-CM

## 2022-08-26 DIAGNOSIS — E66.01 CLASS 2 SEVERE OBESITY DUE TO EXCESS CALORIES WITH SERIOUS COMORBIDITY AND BODY MASS INDEX (BMI) OF 37.0 TO 37.9 IN ADULT (HCC): ICD-10-CM

## 2022-08-26 PROCEDURE — 97802 MEDICAL NUTRITION INDIV IN: CPT

## 2022-08-26 NOTE — PATIENT INSTRUCTIONS
Consume 3 balanced meals/day at appropriate times  30 grams carbohydrate/meal and 15 grams carbohydrate/snack  Increase walking gradually from 30 to 60 minutes/day to help with weight loss

## 2022-08-26 NOTE — PROGRESS NOTES
Medical Nutrition Therapy        Assessment    Visit Type: Initial visit  Chief complaint/Medical Diagnosis/reason for visit E10 9 Type 1 Diabetes Mellitus without complication    HPI Lauryn Weaver came in today for her initial nutrition visit  Lauryn Weaver stated her goal is to lose weight and lower her blood sugars  Lauryn Weaver stated she is on an insulin pump and also using her Dexcom CGM  Lauryn Weaver also stated she does not trust her Dexcom, as her blood sugars can fluctuate from a 2-3 to 50 points difference than her fingerstick  Therefore, Lauryn Weaver stated she does fingersticks prior to each meal or 3x/day to check her blood sugars for correct treatment  Problems identified in food recall include inconsistent carbohydrate intake, unbalanced meals and snacks, avoiding carbohydrates usually at lunch and dinner, and unbalanced meals  Provided patient with a 1400 calorie balanced individualized meal plan to assist with consistency, balance and portion control  Encouraged the consumption of regular meals and snacks at appropriate times  Advised patient to keep carbohydrate intake to 30 grams per meal and 15 grams per snack to assist with glycemic control  My Plate, food models, portion booklet and food labels were used to teach basic carbohydrate counting  Patient agreed to keep daily food logs and return them in eight weeks for assessment  RD will remain available for further dietary questions/concerns  Ht Readings from Last 1 Encounters:   07/05/22 5' (1 524 m)     Wt Readings from Last 3 Encounters:   08/26/22 89 4 kg (197 lb)   07/05/22 89 4 kg (197 lb)   05/09/22 90 3 kg (199 lb)     Weight Change: No    Barriers to Learning: no barriers    Do you follow any special diet presently?: Yes - was trying to avoid eating carbohydrates to lose weight and control blood sugars  However, Lauryn Weaver, knew what she was doing was not working    Who shops: patient  Who cooks: patient    Food Log: Completed via the method of food recall    Breakfast:6AM - Bagel thin + cream cheese, unsweetened ice tea  Morning Snack:10AM - carrot sticks  Lunch:12:45PM Salad with chicken, or ham and cheese sandwich, Coke Zero  Afternoon Snack: None  Dinner:5PM - Chicken + non starch veggies, Coke Zero  Evening Snack:1/2 c ice cream  Beverages: unsweetened ice tea, Coke Zero, water  Eating out/Take out:occasionally  Exercise 20-30 minutes/day walking dog    Calorie needs 1400kcals/day  Carbs: 30g/meal, 15g/snack         Nutrition Diagnosis:  Food and nutrition related knowledge deficit  related to Lack of prior exposure to accurate nutrition related information as evidenced by [employer] inaccurate or incomplete information    Intervention: plate method, label reading, carbohydrate counting, increased protein intake, meal timing, weight/ BMI goals, meal planning, individualized meal plan, exercise guidelines and food diary     Treatment Goals: Patient will consume 3 meals a day, Patient will count carbohydrates and Patient will exercise    Monitoring and evaluation:    Term code indicator  FH 1 3 2 Food Intake Criteria: Consume 3 balanced meals/day at appropriate times  Term code indicator  FH 1 6 3 Carbohydrate Intake Criteria: 30 gram carbohydrate/meal and 15 grams carbohydrate/snack  Term code indicator  CH 2 2 Treatments/Therapy/Alternative Medicine Criteria: Increase walking gradually from 30 - 60 minutes/day to help with weight loss  Materials Provided: Portion Booklet, food logs, CHO counting, individualized meal plan    Patients Response to Instruction:  Comprehensiongood  Motivationgood  Expected Compliancegood    Begin Time: 7:50AM  End Time: 8:50AM  Referring Provider: Dr Chalino Ames    Thank you for coming to the [place] [address] for education today  Please feel free to call with any questions or concerns      Daysi Bedolla  [address] [place] [id number] [phone]

## 2022-09-24 LAB
T4 FREE SERPL-MCNC: 1.82 NG/DL (ref 0.82–1.77)
TSH SERPL DL<=0.005 MIU/L-ACNC: 1.94 UIU/ML (ref 0.45–4.5)

## 2022-10-04 ENCOUNTER — CLINICAL SUPPORT (OUTPATIENT)
Dept: FAMILY MEDICINE CLINIC | Facility: CLINIC | Age: 52
End: 2022-10-04
Payer: COMMERCIAL

## 2022-10-04 DIAGNOSIS — Z23 NEED FOR SHINGLES VACCINE: Primary | ICD-10-CM

## 2022-10-04 PROCEDURE — 90471 IMMUNIZATION ADMIN: CPT

## 2022-10-04 PROCEDURE — 90750 HZV VACC RECOMBINANT IM: CPT

## 2022-10-05 ENCOUNTER — OFFICE VISIT (OUTPATIENT)
Dept: ENDOCRINOLOGY | Facility: CLINIC | Age: 52
End: 2022-10-05
Payer: COMMERCIAL

## 2022-10-05 VITALS
BODY MASS INDEX: 38.87 KG/M2 | SYSTOLIC BLOOD PRESSURE: 120 MMHG | HEIGHT: 60 IN | HEART RATE: 111 BPM | DIASTOLIC BLOOD PRESSURE: 82 MMHG | WEIGHT: 198 LBS

## 2022-10-05 DIAGNOSIS — Z96.41 INSULIN PUMP IN PLACE: ICD-10-CM

## 2022-10-05 DIAGNOSIS — E16.2 HYPOGLYCEMIA: ICD-10-CM

## 2022-10-05 DIAGNOSIS — E10.65 TYPE 1 DIABETES MELLITUS WITH HYPERGLYCEMIA, WITH LONG-TERM CURRENT USE OF INSULIN (HCC): Primary | ICD-10-CM

## 2022-10-05 DIAGNOSIS — E78.2 MIXED HYPERLIPIDEMIA: ICD-10-CM

## 2022-10-05 DIAGNOSIS — E03.9 ACQUIRED HYPOTHYROIDISM: ICD-10-CM

## 2022-10-05 DIAGNOSIS — E55.9 VITAMIN D DEFICIENCY: ICD-10-CM

## 2022-10-05 PROCEDURE — 99215 OFFICE O/P EST HI 40 MIN: CPT | Performed by: INTERNAL MEDICINE

## 2022-10-05 RX ORDER — INSULIN LISPRO 100 [IU]/ML
INJECTION, SOLUTION INTRAVENOUS; SUBCUTANEOUS
COMMUNITY
Start: 2022-08-18

## 2022-10-05 RX ORDER — PERPHENAZINE 16 MG/1
TABLET, FILM COATED ORAL
Qty: 500 STRIP | Refills: 3 | Status: SHIPPED | OUTPATIENT
Start: 2022-10-05

## 2022-10-05 NOTE — PATIENT INSTRUCTIONS
Pump settings as changed   Plan for review of BG in 2-3 weeks   Labs earliest  at the end of the month

## 2022-10-05 NOTE — PROGRESS NOTES
Dina Hurley 45 y o  female MRN: [id number]    Encounter: [id number]      Assessment/Plan     1  Type 1 diabetes mellitus long-term insulin therapy with hypo and hyperglycemia   2  Insulin pump   Well controlled based Last A1c 6 9%  Hyperglycemic most prominent post dinner  Usually needs lower basal settings when she is having her menstrual cycle    Recommend the following at this time  Changed pump Settings as follows  Plan to review blood sugars in 2-3 weeks     Time Rate   12 am  1 4    3 am 1 9    8 am 1 8    7 pm  1 8    8 pm 1 8      Time Insulin:Carb Ratio Insulin Sensitivity Factor   12A  1:5   1:20  -- > 130      3 am 1:3 -- > 1:5  1:20     8 am 1 :5      7 pm-->5pm 1:5 -- > 4 5      8 pm 1:5  1:20--> 1:30      Discussed with patient discordance between continues glucose monitor readings and fingerstick blood sugars ran sugars are trending either up or down and why it happens   Insulin pump algorithms take this into account if patient was to use control IQ/basal IQ   Patient will check more fingerstick blood sugars to compare   Repeat labs as ordered     3  Hyperlipidemia  - continue statin therapy    4  Hypothyroidism-TSH within normal limits, free T4 slightly high   Continue current dose of levothyroxine   Repeat thyroid function test in 3 months    5  History of vitamin-D deficiency-continue supplementation, will repeat with next set of labs     CC: Diabetes    History of Present Illness     HPI:  Dina Hurley is a 45 y o  female presents for a follow-up visit regarding diabetes management       Last seen in 5/2022  Last Eye exam:  08/2022-no diabetic retinopathy per history     Current regimen:   Insulin pump   Tandem, does not trust control/basal IQ, dexcom   uses manual mode   Insulin-Humalog   Active insulin time-5 hours           Time Rate   12 am  1 4    3 am 1 9    8 am 1 8    7 pm  1 8    8 pm 1 8      Time Insulin:Carb Ratio Insulin Sensitivity Factor   12A  1:5   1:20      3 am 1:3      8 am 1 :5      7 pm 1:5      8 pm 1:5      Met with CDE which she feels helped but ready for a refresher and has a follow up appointment  Noticed some weight loss and lower blood sugars     Feels her carb counting is more accurate   Exercise -  Walks  No numbness/tingling  No CP/SOB     Levothyroxine 125 mcg orally daily   Compliant, takes on an empty stomach   Vitamin-D3 1000 International Units orally daily   No symptoms of hypo or hyperthyroidism     Statin:  lipitor   ACE-I/ARB:  --     Home glucose monitoring: CGM interpretation    Time percentage CGM worn %   Time in range  (goal >65-70%)  High   Very high  Low  Very low     CV    % (goal <33%)  SD (goal <50)     Average glucose mg/dL  GMI %     Symptoms of hypoglycemia :  Shakiness/ jitteriness       All other systems were reviewed and are negative      Review of Systems      Historical Information   Past Medical History:   Diagnosis Date    COVID-19 virus infection 11/14/2020    Diabetes ([address] )     Disease of thyroid gland     Hyperlipidemia     last assessed 5/11/15     Past Surgical History:   Procedure Laterality Date    APPENDECTOMY      CARPAL TUNNEL RELEASE Bilateral     CYST REMOVAL      hand, tendon cyst    GANGLION CYST EXCISION      TONSILLECTOMY      WISDOM TOOTH EXTRACTION       Social History   Social History     Substance and Sexual Activity   Alcohol Use No     Social History     Substance and Sexual Activity   Drug Use No     Social History     Tobacco Use   Smoking Status Former Smoker    Start date: 3/29/2013   Smokeless Tobacco Never Used     Family History:   Family History   Problem Relation Age of Onset    No Known Problems Mother     Hypertension Father     Cancer Maternal Grandmother     Liver cancer Maternal Grandmother     No Known Problems Sister     No Known Problems Brother     No Known Problems Maternal Aunt     No Known Problems Maternal Uncle     No Known Problems Paternal Aunt     No Known Problems Paternal Uncle  No Known Problems Maternal Grandfather     No Known Problems Paternal Grandmother     Diabetes Paternal Grandfather     Mental illness Neg Hx     Substance Abuse Neg Hx        Meds/Allergies   Current Outpatient Medications   Medication Sig Dispense Refill    Accu-Chek FastClix Lancets MISC       Accu-Chek FastClix Lancets MISC Use to check blood sugar daily 100 each 3    atorvastatin (LIPITOR) 10 mg tablet TAKE 1 TABLET BY MOUTH  DAILY 90 tablet 3    Baqsimi Two Pack 3 MG/DOSE POWD As needed for hypoglycemia 1 each 3    Cholecalciferol (VITAMIN D3 PO) Take 1,000 Int'l Units by mouth      Continuous Blood Gluc  (Dexcom G6 ) SAIMA Use      Continuous Blood Gluc Sensor (Dexcom G6 Sensor) MISC Use      Continuous Blood Gluc Transmit (Dexcom G6 Transmitter) MISC Change every 3 months 1 each 3    EPINEPHrine (EPIPEN) 0 3 mg/0 3 mL SOAJ INJECT AS NEEDED FOR  ALLERGIC RESPONSE AS  DIRECTED BY YOUR PHYSICIAN 4 each 1    glucose blood (Contour Next Test) test strip Use to test blood sugar 4 times a day 400 strip 3    insulin lispro (HumaLOG) 100 units/mL injection Inject 80 Units under the skin        levothyroxine (Synthroid) 125 mcg tablet Take 1 tablet (125 mcg total) by mouth daily 90 tablet 3    Multiple Vitamins-Minerals (CENTRUM SILVER 50+WOMEN PO) Take by mouth      Nortrel 7/7/7 0 5/0 75/1-35 MG-MCG per tablet TAKE 1 TABLET BY MOUTH  DAILY 84 tablet 3    HumaLOG 100 UNIT/ML injection       montelukast (SINGULAIR) 10 mg tablet Take 1 tablet (10 mg total) by mouth daily at bedtime (Patient not taking: Reported on 10/5/2022) 90 tablet 0     No current facility-administered medications for this visit  Allergies   Allergen Reactions    Penicillins     Sulfites - Food Allergy        Objective   Vitals: Height 5' (1 524 m), weight 89 8 kg (198 lb)  Physical Exam  Constitutional:       Appearance: She is well-developed  HENT:      Head: Normocephalic and atraumatic     Eyes: Conjunctiva/sclera: Conjunctivae normal       Pupils: Pupils are equal, round, and reactive to light  Neck:      Thyroid: No thyromegaly  Cardiovascular:      Rate and Rhythm: Normal rate and regular rhythm  Heart sounds: Normal heart sounds  No murmur heard  Pulmonary:      Effort: Pulmonary effort is normal       Breath sounds: Normal breath sounds  No wheezing  Abdominal:      General: There is no distension  Palpations: Abdomen is soft  Tenderness: There is no abdominal tenderness  Musculoskeletal:         General: Normal range of motion  Cervical back: Normal range of motion and neck supple  Skin:     General: Skin is warm and dry  Neurological:      Mental Status: She is alert and oriented to person, place, and time  Deep Tendon Reflexes: Reflexes normal       Comments: No tremors on the outstretched arms      Psychiatric:         Behavior: Behavior normal          The history was obtained from the review of the chart, patient  Lab Results:   Lab Results   Component Value Date/Time    Hemoglobin A1C 6 9 (A) 07/05/2022 11:19 AM    Hemoglobin A1C 7 6 01/15/2022 12:00 AM    White Blood Cell Count 12 8 (H) 07/23/2022 07:30 AM    Hemoglobin 12 3 07/23/2022 07:30 AM    HCT 37 6 07/23/2022 07:30 AM    MCV 93 07/23/2022 07:30 AM    Platelet Count 846 72/68/0105 07:30 AM    BUN 12 07/23/2022 07:30 AM    Potassium 4 6 07/23/2022 07:30 AM    Chloride 100 07/23/2022 07:30 AM    CO2 20 07/23/2022 07:30 AM    Creatinine 0 73 07/23/2022 07:30 AM    AST 15 07/23/2022 07:30 AM    ALT 12 07/23/2022 07:30 AM    Albumin 4 4 07/23/2022 07:30 AM    Globulin, Total 2 4 07/23/2022 07:30 AM    HDL 53 07/23/2022 07:30 AM    Triglycerides 265 (H) 07/23/2022 07:30 AM         Imaging Studies: I have personally reviewed pertinent reports  Portions of the record may have been created with voice recognition software   Occasional wrong word or "sound a like" substitutions may have occurred due to the inherent limitations of voice recognition software  Read the chart carefully and recognize, using context, where substitutions have occurred

## 2022-10-19 ENCOUNTER — OFFICE VISIT (OUTPATIENT)
Dept: DIABETES SERVICES | Facility: CLINIC | Age: 52
End: 2022-10-19
Payer: COMMERCIAL

## 2022-10-19 VITALS — WEIGHT: 195 LBS | BODY MASS INDEX: 38.08 KG/M2

## 2022-10-19 DIAGNOSIS — E10.9 TYPE 1 DIABETES MELLITUS WITHOUT COMPLICATION (HCC): Primary | ICD-10-CM

## 2022-10-19 PROCEDURE — 97803 MED NUTRITION INDIV SUBSEQ: CPT

## 2022-10-19 NOTE — PROGRESS NOTES
Medical Nutrition Therapy        Assessment    Visit Type: Follow-up visit  Chief complaint/Medical Diagnosis/reason for visit E10 9 Type 1 Diabetes Mellitus without complication    HPI [place] came in today for her nutrition follow up appointment  J[place] brought in her [address] [place] [address] Avenue for evaluation  Tonys food logs were all complete with her 3 meals and 2 snacks/day  All food logs identified all the carbohydrates correctly along with the appropriate amount of carbohydrate/grams/serving  Positive reinforcement given  J[place] also stated she has to count some carbohydrate toward her non-starchy veggies also, as she does not like to go high or low  Reviewed appropriate amount of protein to accompany Layla's meals and snacks, in order to always have the correct balance of protein with carbohydrate on her 1400 calorie meal plan with 30 grams carbohydrate/meal and 15 grams carbohydrate/snack  Additional meal suggestions were given to help in more economical menu planning  Continue to encouraged the consumption of regular meals and snacks at appropriate times  Advised patient to keep carbohydrate intake to 30 grams per meal and 15 grams per snack to assist with glycemic control  Patient stated she will phone at a later date to schedule another nutrition follow up  RD will remain available for further dietary questions/concerns  Ht Readings from Last 1 Encounters:   10/05/22 5' (1 524 m)     Wt Readings from Last 3 Encounters:   10/19/22 88 5 kg (195 lb)   10/05/22 89 8 kg (198 lb)   08/26/22 89 4 kg (197 lb)     Weight Change: Yes Lost 2 lbs since last encounter  Barriers to Learning: no barriers    Do you follow any special diet presently?: Yes - 30 grams carbohydrate/meal and 15 grams carbohydrate/snack on her 1400 calorie meal plan    Who shops: patient  Who cooks: patient    Food Log: Please see Food Records      Calorie needs 1400kcals/day  Carbs: 30g/meal, 15g/snack         Nutrition Diagnosis:  Food and nutrition related knowledge deficit  related to Lack of prior exposure to accurate nutrition related information as evidenced by Verbalizes inaccurate or incomplete information    Intervention: label reading, carbohydrate counting, increased protein intake and meal planning     Treatment Goals: Patient will consume 3 meals a day, Patient will consume snacks and Patient will count carbohydrates    Monitoring and evaluation:    Term code indicator  FH 1 3 2 Food Intake Criteria: Consume 3 balanced meals/day at appropriate times  Term code indicator  FH 1 6 3 Carbohydrate Intake Criteria: 30 grams carbohydrate/meal and 15 grams carbohydrate/snack  Term code indicator  FH 1 3 2 Food Intake Criteria: Consume 2 balanced snacks/day at appropriate times  Materials Provided: CHO counting    Patient’s Response to Instruction:  Comprehensiongood  Motivationgood  Expected Compliancegood    Begin Time: 1:30PM  End Time: 2:00PM  Referring Provider: Inessa Morrison    Thank you for coming to the [address] [place] [address] for education today  Please feel free to call with any questions or concerns      Shauna Nix  [address] [facility] [id number] [phone]

## 2022-10-28 DIAGNOSIS — T78.40XD ALLERGY, SUBSEQUENT ENCOUNTER: ICD-10-CM

## 2022-10-28 DIAGNOSIS — E10.65 TYPE 1 DIABETES MELLITUS WITH HYPERGLYCEMIA (HCC): ICD-10-CM

## 2022-10-28 DIAGNOSIS — E78.01 FAMILIAL HYPERCHOLESTEROLEMIA: ICD-10-CM

## 2022-10-28 RX ORDER — ATORVASTATIN CALCIUM 10 MG/1
TABLET, FILM COATED ORAL DAILY
Qty: 90 TABLET | Refills: 3 | Status: SHIPPED | OUTPATIENT
Start: 2022-10-28

## 2022-10-28 RX ORDER — EPINEPHRINE 0.3 MG/.3ML
INJECTION SUBCUTANEOUS
Qty: 6 EACH | Refills: 0 | Status: SHIPPED | OUTPATIENT
Start: 2022-10-28

## 2022-10-28 RX ORDER — GLUCAGON 3 MG/1
POWDER NASAL
Qty: 2 EACH | Refills: 3 | Status: SHIPPED | OUTPATIENT
Start: 2022-10-28

## 2022-11-01 DIAGNOSIS — E10.65 TYPE 1 DIABETES MELLITUS WITH HYPERGLYCEMIA (HCC): Primary | ICD-10-CM

## 2022-11-01 RX ORDER — INSULIN LISPRO 100 [IU]/ML
INJECTION, SOLUTION INTRAVENOUS; SUBCUTANEOUS
Qty: 80 ML | Refills: 1 | Status: SHIPPED | OUTPATIENT
Start: 2022-11-01

## 2022-11-04 DIAGNOSIS — E10.65 TYPE 1 DIABETES MELLITUS WITH HYPERGLYCEMIA (HCC): ICD-10-CM

## 2022-11-04 DIAGNOSIS — E10.65 TYPE 1 DIABETES MELLITUS WITH HYPERGLYCEMIA (HCC): Primary | ICD-10-CM

## 2022-11-04 RX ORDER — BLOOD-GLUCOSE SENSOR
EACH MISCELLANEOUS
Qty: 6 EACH | Refills: 3 | Status: SHIPPED | OUTPATIENT
Start: 2022-11-04 | End: 2022-11-04 | Stop reason: SDUPTHER

## 2022-11-04 RX ORDER — BLOOD-GLUCOSE SENSOR
EACH MISCELLANEOUS
Qty: 6 EACH | Refills: 3 | Status: SHIPPED | OUTPATIENT
Start: 2022-11-04

## 2022-11-12 LAB — COLOGUARD RESULT REPORTABLE: NEGATIVE

## 2022-11-17 DIAGNOSIS — T78.40XD ALLERGY, SUBSEQUENT ENCOUNTER: ICD-10-CM

## 2022-11-17 RX ORDER — EPINEPHRINE 0.3 MG/.3ML
INJECTION SUBCUTANEOUS
Qty: 6 EACH | Refills: 0 | Status: SHIPPED | OUTPATIENT
Start: 2022-11-17

## 2023-02-14 ENCOUNTER — TELEPHONE (OUTPATIENT)
Dept: ENDOCRINOLOGY | Facility: CLINIC | Age: 53
End: 2023-02-14

## 2023-02-14 DIAGNOSIS — E55.9 VITAMIN D DEFICIENCY: ICD-10-CM

## 2023-02-14 DIAGNOSIS — E10.65 TYPE 1 DIABETES MELLITUS WITH HYPERGLYCEMIA (HCC): Primary | ICD-10-CM

## 2023-02-14 DIAGNOSIS — E03.9 ACQUIRED HYPOTHYROIDISM: ICD-10-CM

## 2023-03-08 NOTE — ADDENDUM NOTE
Addended by: Maria Tejada on: 9/28/2018 10:22 AM     Modules accepted: Level of Service Patient presented in clinic today for a Cystoscopy with stent removal, Ciprofloxacin 500mg was given. Patients blood pressure was 86/54 and had her head down on the desk, was barely opening her eyes, and slow speech. Patient stated she felt like she was going to pass out.  Patient was sent to the ER for further evaluation. Patients significant other was notified and will be meeting her in ER.

## 2023-03-13 DIAGNOSIS — E10.65 TYPE 1 DIABETES MELLITUS WITH HYPERGLYCEMIA (HCC): Primary | ICD-10-CM

## 2023-03-14 ENCOUNTER — OFFICE VISIT (OUTPATIENT)
Dept: FAMILY MEDICINE CLINIC | Facility: CLINIC | Age: 53
End: 2023-03-14

## 2023-03-14 VITALS
RESPIRATION RATE: 14 BRPM | HEART RATE: 105 BPM | SYSTOLIC BLOOD PRESSURE: 122 MMHG | BODY MASS INDEX: 38.87 KG/M2 | HEIGHT: 60 IN | WEIGHT: 198 LBS | DIASTOLIC BLOOD PRESSURE: 82 MMHG | OXYGEN SATURATION: 97 % | TEMPERATURE: 97.7 F

## 2023-03-14 DIAGNOSIS — T78.40XA ALLERGIC REACTION, INITIAL ENCOUNTER: Primary | ICD-10-CM

## 2023-03-14 DIAGNOSIS — E10.65 TYPE 1 DIABETES MELLITUS WITH HYPERGLYCEMIA, WITH LONG-TERM CURRENT USE OF INSULIN (HCC): ICD-10-CM

## 2023-03-14 DIAGNOSIS — R21 RASH: ICD-10-CM

## 2023-03-14 RX ORDER — PREDNISONE 20 MG/1
TABLET ORAL
Qty: 14 TABLET | Refills: 0 | Status: SHIPPED | OUTPATIENT
Start: 2023-03-14

## 2023-03-14 RX ORDER — DEXAMETHASONE SODIUM PHOSPHATE 4 MG/ML
4 INJECTION, SOLUTION INTRA-ARTICULAR; INTRALESIONAL; INTRAMUSCULAR; INTRAVENOUS; SOFT TISSUE ONCE
Status: COMPLETED | OUTPATIENT
Start: 2023-03-14 | End: 2023-03-14

## 2023-03-14 RX ORDER — DEXAMETHASONE SODIUM PHOSPHATE 4 MG/ML
4 INJECTION, SOLUTION INTRA-ARTICULAR; INTRALESIONAL; INTRAMUSCULAR; INTRAVENOUS; SOFT TISSUE EVERY 6 HOURS SCHEDULED
Status: DISCONTINUED | OUTPATIENT
Start: 2023-03-14 | End: 2023-03-14

## 2023-03-14 RX ORDER — BLOOD-GLUCOSE,RECEIVER,CONT
1 EACH MISCELLANEOUS DAILY
Qty: 1 EACH | Refills: 0 | Status: SHIPPED | OUTPATIENT
Start: 2023-03-14 | End: 2023-03-14

## 2023-03-14 RX ORDER — BLOOD-GLUCOSE SENSOR
1 EACH MISCELLANEOUS AS NEEDED
Qty: 9 EACH | Refills: 1 | Status: SHIPPED | OUTPATIENT
Start: 2023-03-14 | End: 2023-03-14

## 2023-03-14 RX ADMIN — DEXAMETHASONE SODIUM PHOSPHATE 4 MG: 4 INJECTION, SOLUTION INTRA-ARTICULAR; INTRALESIONAL; INTRAMUSCULAR; INTRAVENOUS; SOFT TISSUE at 15:50

## 2023-03-14 NOTE — PROGRESS NOTES
Name: Gabo Man      : 1970      MRN: [id number]  Encounter Provider: Hieu Gamez MD  Encounter Date: 3/14/2023   Encounter department: [address]     1  Allergic reaction, initial encounter  -     predniSONE 20 mg tablet; 2 PO QD X 4 DAYS, THEN 1 PO QD X 4 DAYS, THEN 1/2 PO QD X 4 DAYS  -     dexamethasone (DECADRON) injection 4 mg    2  Rash    3  Type 1 diabetes mellitus with hyperglycemia, with long-term current use of insulin (HCC)    BMI Counseling: Body mass index is 38 67 kg/m²  The BMI is above normal  Nutrition recommendations include encouraging healthy choices of fruits and vegetables and moderation in carbohydrate intake  Exercise recommendations include exercising 3-5 times per week  No pharmacotherapy was ordered  Rationale for BMI follow-up plan is due to patient being overweight or obese  Subjective      PATIENT COMPLAINS OF RASH AND ITCHING  USUALLY HAPPENS THIS TIME OF YEAR  SL TROUBLE BREATHING  NO FEVER OR CHILLS  DENIES ANY WHEEZING    Review of Systems   Constitutional: Negative for chills, fatigue and fever  HENT: Negative for congestion, ear discharge, ear pain, mouth sores, postnasal drip, sore throat and trouble swallowing  Eyes: Negative for pain, discharge and visual disturbance  Respiratory: Negative for cough, shortness of breath and wheezing  Cardiovascular: Negative for chest pain, palpitations and leg swelling  Gastrointestinal: Negative for abdominal distention, abdominal pain, blood in stool, diarrhea and nausea  Endocrine: Negative for polydipsia, polyphagia and polyuria  Genitourinary: Negative for dysuria, frequency, hematuria and urgency  Musculoskeletal: Negative for arthralgias, gait problem and joint swelling  Skin: Positive for rash  Negative for pallor  Neurological: Negative for dizziness, syncope, speech difficulty, weakness, light-headedness, numbness and headaches  Hematological: Negative for adenopathy  Psychiatric/Behavioral: Negative for behavioral problems, confusion and sleep disturbance  The patient is not nervous/anxious  Current Outpatient Medications on File Prior to Visit   Medication Sig   • Accu-Chek FastClix Lancets MISC Use to check blood sugar daily   • atorvastatin (LIPITOR) 10 mg tablet TAKE 1 TABLET BY MOUTH  DAILY   • Baqsimi Two Pack 3 MG/DOSE POWD INSERT DEVICE TIP INTO 1  NOSTRIL PUSH DEVICE PLUNGER UNTIL GREEN LINE  DISAPPEARS  AS NEEDED FOR  HYPOGLYCEMIA   • Cholecalciferol (VITAMIN D3 PO) Take 1,000 Int'l Units by mouth   • Continuous Blood Gluc Sensor (Dexcom G6 Sensor) MISC Use to check blood sugar daily   • Continuous Blood Gluc Transmit (Dexcom G6 Transmitter) MISC Change every 3 months   • EPINEPHrine (EPIPEN) 0 3 mg/0 3 mL SOAJ INJECT INTRAMUSCULARLY 1 PEN AS  NEEDED FOR ALLERGIC RESPONSE AS  DIRECTED BY MD Lei Roldan MEDICAL  HELP AFTER USE     • glucose blood (Contour Next Test) test strip Use to test blood sugar 5 times a day   • HumaLOG 100 UNIT/ML injection Use up to 80 units daily in pump   • levothyroxine (Synthroid) 125 mcg tablet Take 1 tablet (125 mcg total) by mouth daily   • Multiple Vitamins-Minerals (CENTRUM SILVER 50+WOMEN PO) Take by mouth   • Nortrel 7/7/7 0 5/0 75/1-35 MG-MCG per tablet TAKE 1 TABLET BY MOUTH  DAILY   • [DISCONTINUED] Accu-Chek FastClix Lancets MISC    • [DISCONTINUED] Continuous Blood Gluc  (Dexcom G6 ) SAIMA Use   • [DISCONTINUED] Continuous Blood Gluc  (Dexcom G7 ) SAIMA Use 1 each in the morning Use to monitor blood sugar on a continuous basis (Patient not taking: Reported on 3/14/2023)   • [DISCONTINUED] Continuous Blood Gluc Sensor (Dexcom G7 Sensor) MISC Use 1 actuation as needed (every 10 days) Change sensor every 10 days (Patient not taking: Reported on 3/14/2023)   • [DISCONTINUED] insulin lispro (HumaLOG) 100 units/mL injection Inject 80 Units under the skin     • [DISCONTINUED] montelukast (SINGULAIR) 10 mg tablet Take 1 tablet (10 mg total) by mouth daily at bedtime (Patient not taking: Reported on 10/5/2022)       Objective     /82 (BP Location: Right arm, Patient Position: Sitting, Cuff Size: Large)   Pulse 105   Temp 97 7 °F (36 5 °C) (Temporal)   Resp 14   Ht 5' (1 524 m)   Wt 89 8 kg (198 lb)   SpO2 97%   BMI 38 67 kg/m²     Physical Exam  Constitutional:       Appearance: Normal appearance  She is well-developed  She is obese  HENT:      Head: Normocephalic and atraumatic  Mouth/Throat:      Mouth: Mucous membranes are moist       Comments: NO SWELLING  Eyes:      General:         Right eye: No discharge  Left eye: No discharge  Conjunctiva/sclera: Conjunctivae normal       Pupils: Pupils are equal, round, and reactive to light  Neck:      Thyroid: No thyromegaly  Cardiovascular:      Rate and Rhythm: Normal rate and regular rhythm  Heart sounds: Normal heart sounds  No murmur heard  Pulmonary:      Effort: Pulmonary effort is normal  No respiratory distress  Breath sounds: No wheezing or rales  Comments: PROLONGED EXP PHASE, NO WHEEZE    Abdominal:      General: Bowel sounds are normal       Palpations: Abdomen is soft  Tenderness: There is no abdominal tenderness  Comments: OBESE   Musculoskeletal:         General: No tenderness  Normal range of motion  Cervical back: Normal range of motion and neck supple  Lymphadenopathy:      Cervical: No cervical adenopathy  Skin:     General: Skin is warm and dry  Findings: Rash present  No erythema  Comments: ERYTHEMATOUS RASH ON LIMBS AND TRUNK   Neurological:      General: No focal deficit present  Mental Status: She is alert and oriented to person, place, and time  Psychiatric:         Mood and Affect: Mood normal          Behavior: Behavior normal          Thought Content:  Thought content normal          Judgment: Judgment normal  Emily Adam MD

## 2023-03-14 NOTE — PATIENT INSTRUCTIONS
PLENTY OF FLUIDS - HYDRATION  COOL COMPRESSES  BENADRYL AS NEEDED    MEDICATION AS DIRECTED  MONITOR SYMPTOMS    CALL Thursday OR Friday WITH UPDATE, SOONER PRN

## 2023-03-16 ENCOUNTER — TELEPHONE (OUTPATIENT)
Dept: ADMINISTRATIVE | Facility: OTHER | Age: 53
End: 2023-03-16

## 2023-03-16 ENCOUNTER — TELEPHONE (OUTPATIENT)
Dept: FAMILY MEDICINE CLINIC | Facility: CLINIC | Age: 53
End: 2023-03-16

## 2023-03-16 NOTE — TELEPHONE ENCOUNTER
----- Message from Araceli Gongora sent at 3/14/2023  3:42 PM EDT -----  Regarding: CARE GAP REQUST - Immunization  03/14/23 3:42 PM    Hello, our patient attached above has had Flu Immunization(s) completed/performed  Please assist in updating the patient chart by making an External outreach to [facility] facility located in [place], [place]  The date of service is Fall 2022      Thank you,  Robin Larson  [address]

## 2023-03-16 NOTE — LETTER
Vaccination Request Form: Influenza      Date Requested: 23  Patient: Le Juan Miguel  Patient : 1970   Referring Provider: Shelby Campoverde MD       The above patient has informed us that they have had their   most recent Influenza administered at your facility  Please   complete this form and attach all corresponding documentation  Date of Vaccine(s) Given  ______________________________    Lot Number(s) _______________________________________    Manufacture(s) ______________________________________    Dose Amount (s) _____________________________________    Expiration Date(s) ____________________________________    Comments __________________________________________________________  ____________________________________________________________________  ____________________________________________________________________  ____________________________________________________________________    Administering Facility  ________________________________________________    Vaccine Administered By (print name) ___________________________________      Form Completed By (print name) _______________________________________      Signature ___________________________________________________________      These reports are needed for  compliance  Please fax this completed form and a copy of the Vaccine Document(s) to our office located at [place] [phone] as soon as possible to Fax [phone] attention Mirtha Wall: Phone [phone]    We thank you for your assistance in treating our mutual patient

## 2023-03-16 NOTE — TELEPHONE ENCOUNTER
Jairo Nelson states she woke up this morning with her face bright red & warm to touch  Her arms are also warm to the touch  She has been taking her zyrtec and prednisone  Please advise      [phone]

## 2023-03-16 NOTE — TELEPHONE ENCOUNTER
THE PREDNISONE MIGHT BE CAUSING THE WARMTH AND FLUSHING  THAT WILL IMPROVE AS SHE SAPPERS DOWN THE DOSE    IF IT IS TOO UNCOMFORTABLE SHE CAN JUST STOP IT

## 2023-03-17 NOTE — TELEPHONE ENCOUNTER
Upon review of the In Basket request and the patient's chart, initial outreach has been made via fax to facility  Please see Contacts section for details       Thank you  Gabrielle Cai

## 2023-03-20 ENCOUNTER — TELEPHONE (OUTPATIENT)
Dept: FAMILY MEDICINE CLINIC | Facility: CLINIC | Age: 53
End: 2023-03-20

## 2023-03-20 NOTE — TELEPHONE ENCOUNTER
Upon review of the In Basket request we were able to locate, review, and update the patient chart as requested for Immunization(s) Influenza vaccine  Any additional questions or concerns should be emailed to the Practice Liaisons via the appropriate education email address, please do not reply via In Basket      Thank you  Gabrielle Cai

## 2023-03-27 DIAGNOSIS — E10.65 TYPE 1 DIABETES MELLITUS WITH HYPERGLYCEMIA (HCC): Primary | ICD-10-CM

## 2023-03-27 RX ORDER — BLOOD-GLUCOSE SENSOR
1 EACH MISCELLANEOUS AS NEEDED
Qty: 9 EACH | Refills: 3 | Status: SHIPPED | OUTPATIENT
Start: 2023-03-27 | End: 2023-04-06

## 2023-03-27 RX ORDER — BLOOD-GLUCOSE,RECEIVER,CONT
1 EACH MISCELLANEOUS CONTINUOUS
Qty: 1 EACH | Refills: 0 | Status: SHIPPED | OUTPATIENT
Start: 2023-03-27

## 2023-03-31 DIAGNOSIS — E10.65 TYPE 1 DIABETES MELLITUS WITH HYPERGLYCEMIA (HCC): ICD-10-CM

## 2023-03-31 RX ORDER — INSULIN LISPRO 100 [IU]/ML
INJECTION, SOLUTION INTRAVENOUS; SUBCUTANEOUS
Qty: 80 ML | Refills: 3 | Status: SHIPPED | OUTPATIENT
Start: 2023-03-31

## 2023-04-02 LAB
25(OH)D3+25(OH)D2 SERPL-MCNC: 31.6 NG/ML (ref 30–100)
ALBUMIN SERPL-MCNC: 4.5 G/DL (ref 3.8–4.9)
ALBUMIN/GLOB SERPL: 2 {RATIO} (ref 1.2–2.2)
ALP SERPL-CCNC: 95 IU/L (ref 44–121)
ALT SERPL-CCNC: 11 IU/L (ref 0–32)
AST SERPL-CCNC: 19 IU/L (ref 0–40)
BILIRUB SERPL-MCNC: 0.3 MG/DL (ref 0–1.2)
BUN SERPL-MCNC: 11 MG/DL (ref 6–24)
BUN/CREAT SERPL: 15 (ref 9–23)
CALCIUM SERPL-MCNC: 9.4 MG/DL (ref 8.7–10.2)
CHLORIDE SERPL-SCNC: 101 MMOL/L (ref 96–106)
CHOLEST SERPL-MCNC: 204 MG/DL (ref 100–199)
CHOLEST/HDLC SERPL: 3.9 RATIO (ref 0–4.4)
CO2 SERPL-SCNC: 22 MMOL/L (ref 20–29)
CREAT SERPL-MCNC: 0.75 MG/DL (ref 0.57–1)
EGFR: 95 ML/MIN/1.73
EST. AVERAGE GLUCOSE BLD GHB EST-MCNC: 157 MG/DL
GLOBULIN SER-MCNC: 2.3 G/DL (ref 1.5–4.5)
GLUCOSE SERPL-MCNC: 122 MG/DL (ref 70–99)
HBA1C MFR BLD: 7.1 % (ref 4.8–5.6)
HDLC SERPL-MCNC: 52 MG/DL
LDLC SERPL CALC-MCNC: 105 MG/DL (ref 0–99)
LDLC SERPL DIRECT ASSAY-MCNC: 89 MG/DL (ref 0–99)
POTASSIUM SERPL-SCNC: 5.4 MMOL/L (ref 3.5–5.2)
PROT SERPL-MCNC: 6.8 G/DL (ref 6–8.5)
SL AMB VLDL CHOLESTEROL CALC: 47 MG/DL (ref 5–40)
SODIUM SERPL-SCNC: 140 MMOL/L (ref 134–144)
T4 FREE SERPL-MCNC: 1.4 NG/DL (ref 0.82–1.77)
TRIGL SERPL-MCNC: 278 MG/DL (ref 0–149)
TSH SERPL DL<=0.005 MIU/L-ACNC: 5.36 UIU/ML (ref 0.45–4.5)

## 2023-04-02 PROCEDURE — 3051F HG A1C>EQUAL 7.0%<8.0%: CPT | Performed by: INTERNAL MEDICINE

## 2023-05-26 DIAGNOSIS — E03.9 ACQUIRED HYPOTHYROIDISM: ICD-10-CM

## 2023-05-26 RX ORDER — LEVOTHYROXINE SODIUM 0.12 MG/1
TABLET ORAL
Qty: 90 TABLET | Refills: 3 | Status: SHIPPED | OUTPATIENT
Start: 2023-05-26

## 2023-06-19 ENCOUNTER — OFFICE VISIT (OUTPATIENT)
Dept: URGENT CARE | Facility: CLINIC | Age: 53
End: 2023-06-19
Payer: COMMERCIAL

## 2023-06-19 VITALS
SYSTOLIC BLOOD PRESSURE: 152 MMHG | WEIGHT: 195 LBS | HEART RATE: 90 BPM | HEIGHT: 60 IN | BODY MASS INDEX: 38.28 KG/M2 | OXYGEN SATURATION: 99 % | TEMPERATURE: 97.9 F | DIASTOLIC BLOOD PRESSURE: 72 MMHG | RESPIRATION RATE: 20 BRPM

## 2023-06-19 DIAGNOSIS — H92.01 RIGHT EAR PAIN: Primary | ICD-10-CM

## 2023-06-19 DIAGNOSIS — H69.91 DISORDER OF RIGHT EUSTACHIAN TUBE: ICD-10-CM

## 2023-06-19 PROCEDURE — 99213 OFFICE O/P EST LOW 20 MIN: CPT | Performed by: PHYSICIAN ASSISTANT

## 2023-06-19 RX ORDER — FLUTICASONE PROPIONATE 50 MCG
1 SPRAY, SUSPENSION (ML) NASAL DAILY
Qty: 1 ML | Refills: 0 | Status: SHIPPED | OUTPATIENT
Start: 2023-06-19

## 2023-06-19 NOTE — PATIENT INSTRUCTIONS
Eustachian Tube Dysfunction   AMBULATORY CARE:   Eustachian tube dysfunction (ETD)  is a condition that prevents your eustachian tubes from opening properly  It can also cause them to become blocked  Eustachian tubes connect your middle ear to the back of your nose and throat  These tubes open and allow air to flow in and out when you sneeze, swallow, or yawn  Common signs and symptoms include the following:   Fullness or pressure in your ears    Muffled hearing, or a feeling you are hearing under water or have clogged ears    Pain in one or both ears    Ringing in your ears    Popping, crackling, or clicking feeling in your ears    Trouble keeping your balance    Call your doctor or otolaryngologist if:   Your symptoms do not improve or get worse  You have a fever  You have any hearing loss  You have questions or concerns about your condition or care  Treatment:  ETD may get better on its own without any treatment  If it continues, you may need any of the following:  Swallow, yawn, or chew gum  to help open your eustachian tubes  Your healthcare provider may also recommend you blow with your mouth shut and your nostrils pinched closed  Air pressure devices  push air into your nose and eustachian tubes to help relieve air pressure in your ear  Treatment for allergies  such as decongestants, antihistamines, and nasal steroids may improve ETD  They may help decrease swelling of the eustachian tubes  A myringotomy  is surgery to make a hole in your eardrum  The hole relieves pressure and lets fluid drain from your ear  A pressure equalizing (PE) tube may be used to keep the hole open and to help drain fluid  Tuboplasty  is a procedure to widen your eustachian tubes  Follow up with your doctor or otolaryngologist as directed:  Write down your questions so you remember to ask them during your visits    © Lisa Neil 2022 Information is for End User's use only and may not be sold, redistributed or otherwise used for commercial purposes  The above information is an  only  It is not intended as medical advice for individual conditions or treatments  Talk to your doctor, nurse or pharmacist before following any medical regimen to see if it is safe and effective for you

## 2023-06-19 NOTE — PROGRESS NOTES
[facility]        NAME: Lexi Nurse is a 52 y o  female  : 1970    MRN: [id number]  DATE: 2023  TIME: 1:00 PM    Assessment and Plan   Right ear pain [H92 01]  1  Right ear pain        2  Disorder of right eustachian tube  fluticasone (FLONASE) 50 mcg/act nasal spray        Will start with Flonase versus oral steroids since the patient has a history of type 1 diabetes  Patient Instructions   Patient Instructions     Eustachian Tube Dysfunction   AMBULATORY CARE:   Eustachian tube dysfunction (ETD)  is a condition that prevents your eustachian tubes from opening properly  It can also cause them to become blocked  Eustachian tubes connect your middle ear to the back of your nose and throat  These tubes open and allow air to flow in and out when you sneeze, swallow, or yawn  Common signs and symptoms include the following:   • Fullness or pressure in your ears    • Muffled hearing, or a feeling you are hearing under water or have clogged ears    • Pain in one or both ears    • Ringing in your ears    • Popping, crackling, or clicking feeling in your ears    • Trouble keeping your balance    Call your doctor or otolaryngologist if:   • Your symptoms do not improve or get worse  • You have a fever  • You have any hearing loss  • You have questions or concerns about your condition or care  Treatment:  ETD may get better on its own without any treatment  If it continues, you may need any of the following:  • Swallow, yawn, or chew gum  to help open your eustachian tubes  Your healthcare provider may also recommend you blow with your mouth shut and your nostrils pinched closed  • Air pressure devices  push air into your nose and eustachian tubes to help relieve air pressure in your ear  • Treatment for allergies  such as decongestants, antihistamines, and nasal steroids may improve ETD  They may help decrease swelling of the eustachian tubes      • A myringotomy  is surgery to make a hole in your eardrum  The hole relieves pressure and lets fluid drain from your ear  A pressure equalizing (PE) tube may be used to keep the hole open and to help drain fluid  • Tuboplasty  is a procedure to widen your eustachian tubes  Follow up with your doctor or otolaryngologist as directed:  Write down your questions so you remember to ask them during your visits  © Copyright [facility] 2022 Information is for End User's use only and may not be sold, redistributed or otherwise used for commercial purposes  The above information is an  only  It is not intended as medical advice for individual conditions or treatments  Talk to your doctor, nurse or pharmacist before following any medical regimen to see if it is safe and effective for you  Follow up with PCP in 3-5 days  Proceed to  ER if symptoms worsen  Chief Complaint     Chief Complaint   Patient presents with   • Earache     Right ear pain started approx 6 days ago  History of Present Illness       The patient is a 52-year-old female presenting today with right ear pain  She reports pain for the past 6 days  Denies any history of ear infections  Review of Systems   Review of Systems   Constitutional: Negative for activity change, appetite change, chills, fatigue and fever  HENT: Positive for ear pain  Negative for congestion, rhinorrhea, sinus pressure, sinus pain and sore throat  Eyes: Negative for pain and visual disturbance  Respiratory: Negative for cough, chest tightness and shortness of breath  Cardiovascular: Negative for chest pain and palpitations  Gastrointestinal: Negative for abdominal pain, diarrhea, nausea and vomiting  Genitourinary: Negative for dysuria and hematuria  Musculoskeletal: Negative for arthralgias, back pain and myalgias  Skin: Negative for color change, pallor and rash  Neurological: Negative for seizures, syncope and headaches     All other systems reviewed and are negative  Current Medications       Current Outpatient Medications:   •  fluticasone (FLONASE) 50 mcg/act nasal spray, 1 spray into each nostril daily, Disp: 1 mL, Rfl: 0  •  Accu-Chek FastClix Lancets MISC, Use to check blood sugar daily, Disp: 100 each, Rfl: 3  •  atorvastatin (LIPITOR) 10 mg tablet, TAKE 1 TABLET BY MOUTH  DAILY, Disp: 90 tablet, Rfl: 3  •  Baqsimi Two Pack 3 MG/DOSE POWD, INSERT DEVICE TIP INTO 1  NOSTRIL PUSH DEVICE PLUNGER UNTIL GREEN LINE  DISAPPEARS   AS NEEDED FOR  HYPOGLYCEMIA, Disp: 2 each, Rfl: 3  •  Cholecalciferol (VITAMIN D3 PO), Take 1,000 Int'l Units by mouth, Disp: , Rfl:   •  Continuous Blood Gluc  (Dexcom G7 ) SAIMA, Use 1 Device continuous (Patient not taking: Reported on 4/17/2023), Disp: 1 each, Rfl: 0  •  Continuous Blood Gluc Sensor (Dexcom G6 Sensor) MISC, Use to check blood sugar daily, Disp: 6 each, Rfl: 3  •  Continuous Blood Gluc Transmit (Dexcom G6 Transmitter) MISC, Change every 3 months, Disp: 1 each, Rfl: 3  •  EPINEPHrine (EPIPEN) 0 3 mg/0 3 mL SOAJ, INJECT INTRAMUSCULARLY 1 PEN AS  NEEDED FOR ALLERGIC RESPONSE AS  DIRECTED BY MD  SEEK MEDICAL  HELP AFTER USE , Disp: 6 each, Rfl: 0  •  glucose blood (Contour Next Test) test strip, Use to test blood sugar 5 times a day, Disp: 500 strip, Rfl: 3  •  HumaLOG 100 UNIT/ML injection, INJECT SUBCUTANEOUSLY UP TO 80  UNITS DAILY VIA INSULIN PUMP, Disp: 80 mL, Rfl: 3  •  levothyroxine 125 mcg tablet, TAKE 1 TABLET BY MOUTH  DAILY, Disp: 90 tablet, Rfl: 3  •  Multiple Vitamins-Minerals (CENTRUM SILVER 50+WOMEN PO), Take by mouth, Disp: , Rfl:   •  Nortrel 7/7/7 0 5/0 75/1-35 MG-MCG per tablet, TAKE 1 TABLET BY MOUTH  DAILY, Disp: 84 tablet, Rfl: 3    Current Allergies     Allergies as of 06/19/2023 - Reviewed 06/19/2023   Allergen Reaction Noted   • Penicillins  02/16/2015   • Sulfites - food allergy  02/16/2015            The following portions of the patient's history were reviewed and updated as appropriate: allergies, current medications, past family history, past medical history, past social history, past surgical history and problem list      Past Medical History:   Diagnosis Date   • COVID-19 virus infection 11/14/2020   • Diabetes ([facility] [address] )    • Disease of thyroid gland    • Hyperlipidemia     last assessed 5/11/15       Past Surgical History:   Procedure Laterality Date   • APPENDECTOMY     • CARPAL TUNNEL RELEASE Bilateral    • CYST REMOVAL      hand, tendon cyst   • GANGLION CYST EXCISION     • TONSILLECTOMY     • WISDOM TOOTH EXTRACTION         Family History   Problem Relation Age of Onset   • No Known Problems Mother    • Hypertension Father    • Cancer Maternal Grandmother    • Liver cancer Maternal Grandmother    • No Known Problems Sister    • No Known Problems Brother    • No Known Problems Maternal Aunt    • No Known Problems Maternal Uncle    • No Known Problems Paternal Aunt    • No Known Problems Paternal Uncle    • No Known Problems Maternal Grandfather    • No Known Problems Paternal Grandmother    • Diabetes Paternal Grandfather    • Mental illness Neg Hx    • Substance Abuse Neg Hx          Medications have been verified  Objective   /72   Pulse 90   Temp 97 9 °F (36 6 °C)   Resp 20   Ht 5' (1 524 m)   Wt 88 5 kg (195 lb)   SpO2 99%   BMI 38 08 kg/m²        Physical Exam     Physical Exam  Vitals and nursing note reviewed  Constitutional:       General: She is not in acute distress  Appearance: Normal appearance  She is normal weight  She is not ill-appearing, toxic-appearing or diaphoretic  HENT:      Head: Normocephalic and atraumatic  Right Ear: Tympanic membrane, ear canal and external ear normal  There is no impacted cerumen  Left Ear: Tympanic membrane, ear canal and external ear normal  There is no impacted cerumen  Nose: Nose normal  No congestion or rhinorrhea        Mouth/Throat:      Mouth: Mucous membranes are moist       Pharynx: Oropharynx is clear  No oropharyngeal exudate or posterior oropharyngeal erythema  Cardiovascular:      Rate and Rhythm: Normal rate and regular rhythm  Pulmonary:      Effort: Pulmonary effort is normal       Breath sounds: Normal breath sounds  Skin:     General: Skin is warm and dry  Capillary Refill: Capillary refill takes less than 2 seconds  Neurological:      Mental Status: She is alert

## 2023-07-17 ENCOUNTER — OFFICE VISIT (OUTPATIENT)
Dept: ENDOCRINOLOGY | Facility: CLINIC | Age: 53
End: 2023-07-17
Payer: COMMERCIAL

## 2023-07-17 VITALS
WEIGHT: 196.6 LBS | OXYGEN SATURATION: 97 % | DIASTOLIC BLOOD PRESSURE: 78 MMHG | HEIGHT: 60 IN | HEART RATE: 81 BPM | BODY MASS INDEX: 38.6 KG/M2 | SYSTOLIC BLOOD PRESSURE: 144 MMHG

## 2023-07-17 DIAGNOSIS — E16.2 HYPOGLYCEMIA: ICD-10-CM

## 2023-07-17 DIAGNOSIS — E10.9 TYPE 1 DIABETES MELLITUS WITHOUT COMPLICATION (HCC): ICD-10-CM

## 2023-07-17 DIAGNOSIS — E10.65 TYPE 1 DIABETES MELLITUS WITH HYPERGLYCEMIA (HCC): ICD-10-CM

## 2023-07-17 DIAGNOSIS — R03.0 ELEVATED BLOOD PRESSURE READING: ICD-10-CM

## 2023-07-17 DIAGNOSIS — E55.9 VITAMIN D DEFICIENCY: Primary | ICD-10-CM

## 2023-07-17 DIAGNOSIS — E03.9 HYPOTHYROIDISM, UNSPECIFIED TYPE: ICD-10-CM

## 2023-07-17 DIAGNOSIS — E78.2 MIXED HYPERLIPIDEMIA: ICD-10-CM

## 2023-07-17 PROCEDURE — 99214 OFFICE O/P EST MOD 30 MIN: CPT | Performed by: NURSE PRACTITIONER

## 2023-07-17 PROCEDURE — 95251 CONT GLUC MNTR ANALYSIS I&R: CPT | Performed by: NURSE PRACTITIONER

## 2023-07-17 RX ORDER — ATORVASTATIN CALCIUM 20 MG/1
20 TABLET, FILM COATED ORAL DAILY
Qty: 90 TABLET | Refills: 2 | Status: SHIPPED | OUTPATIENT
Start: 2023-07-17

## 2023-07-17 NOTE — PROGRESS NOTES
Established Patient Progress Note      CC: Type 1 Diabetes Mellitus   Hypothyroidism    Impression & Plan:    Problem List Items Addressed This Visit        Endocrine    Type 1 diabetes mellitus without complication ([address])       Lab Results   Component Value Date    HGBA1C 7.3 (H) 07/08/2023     HGA1C close to goal, has been suffering with migraines and erratic glucose level when she had a trial off of OCP therapy. Follows with primary care for gynecologic care. She recently transitioned to [employer] which she feels has improved accuracy and would be comfortable with automation when available. BGL Reviewed: Continue use of CGM    Treatment regimen:   Basal rate: (MN) 1.3 (3A) 1.9 (8A) 1.8   Insulin to carb ratio: (MN) 30 (3A) 20 (8P) 30  Insulin sensitivity factor: (MN) 5 (3A) 3 (8A) 5 (5P) 4.5 (8P) 5  BG target: 120  Active Insulin time: 4 hr    Discussed risks/complications associated with uncontrolled diabetes including organ involvement, heart attack, stroke, death. Call for blood sugars less than 70 mg/dl or patterns over 250 mg/dl. Discussed symptoms and treatment of hypoglycemia. Routine follow up for diabetic eye and foot exams. Ordered blood work to complete prior to next visit. Follow up in 3 months. Relevant Orders    Comprehensive metabolic panel    Hemoglobin A1C    Albumin / creatinine urine ratio    Hypothyroidism     Clinically euthyroid. TSH mildly low. Will continue on levothyroxine 125 mcg daily. Repeat thyroid function tests in 3 months. Relevant Orders    TSH, 3rd generation    T4, free- Lab Collect    Type 1 diabetes mellitus with hyperglycemia (HCC)    Hypoglycemia     Has baqsimi incase of severe lows. Has medical ID tattoo. Other    Hyperlipidemia     Increased statin therapy to atorvastatin 20 mg daily. Will repeat lipid panel in 3 months. Reviewed lifestyle modification.          Relevant Medications    atorvastatin (LIPITOR) 20 mg tablet    Other Relevant Orders    Lipid panel- Lab Collect    Vitamin D deficiency - Primary     Continues supplementation         Elevated blood pressure reading     Patient has mildly elevated blood pressure, she feels this is secondary to migraine she has been experiencing. Would like to follow up with primary care and discuss after migraine improves. Reviewed risks of uncontrolled hypertension. Orders Placed This Encounter   Procedures   • Lipid panel- Lab Collect     This is a patient instruction: This test requires patient fasting for 10-12 hours or longer. Drinking of black coffee or black tea is acceptable. Standing Status:   Future     Number of Occurrences:   1     Standing Expiration Date:   7/17/2024   • TSH, 3rd generation     This is a patient instruction: This test is non-fasting. Please drink two glasses of water morning of bloodwork. Standing Status:   Future     Number of Occurrences:   1     Standing Expiration Date:   1/17/2024   • T4, free- Lab Collect     Standing Status:   Future     Number of Occurrences:   1     Standing Expiration Date:   7/17/2024   • Comprehensive metabolic panel     This is a patient instruction: Patient fasting for 8 hours or longer recommended. Standing Status:   Future     Number of Occurrences:   1     Standing Expiration Date:   7/17/2024   • Hemoglobin A1C     Standing Status:   Future     Number of Occurrences:   1     Standing Expiration Date:   7/17/2024   • Albumin / creatinine urine ratio     Standing Status:   Future     Number of Occurrences:   1     Standing Expiration Date:   1/17/2024       History of Present Illness:   Darrel Bustillo is a 52 y.o. female with a history of type 1 diabetes with long term use of insulin for approximately 10 years. Denies history of retinopathy, last seen by optho in August 2022, no history of neuropathy, kidney disease, claudication, heart attack, or stroke.   Denies recent illness or hospitalizations. Denies recent severe hypoglycemic or severe hyperglycemic episodes. Denies any issues with her current regimen. Home glucose monitoring: are performed regularly with CGM. Last office visit in April 2023 with Dr. Kaley Qureshi. CGM Interpretation  Tanja Parker   Device used Dexcom G7  Home use   Indication: Type 1 Diabetes   More than 72 hours of data was reviewed. Report to be scanned to chart. Date Range: July 4, 2023- July 17, 2023  Analysis of data:   Average Glucose: 167 mg/dL  GMI: 7.3%   SD : 59 mg/dL  Time in Target Range: 59%   Time Above Range: 31% high, 8% very high    Time Below Range: 1% low, <1% very low   Interpretation of data:   Some variability    Patient is on a Tandem pump prescribed by endocrinology. She has been on a pump for many years. She denies any malfunctioning of the pump. Current Problems with Pump: None    Current Insulin pump settings:  Basal rate: (MN) 1.3 (3A) 1.9 (8A) 1.8   Insulin to carb ratio: (MN) 30 (3A) 20 (8P) 30  Insulin sensitivity factor: (MN) 5 (3A) 3 (8A) 5 (5P) 4.5 (8P) 5  BG target: 120  Active Insulin time: 4 hr    Type of insulin:  Humalog    Backup Plan: Would take 40 units long acting insulin, will check expiration date on insulin pen. Aware that in case of malfunctioning of the pump or unexplained hyperglycemia to use basal and bolus therapy as backup which is prescribed to the patient. Also notified patient to call clinic and/or pump company if any issues or go to emergency department if signs/symptoms of DKA. Hypoglycemia: Has baqsimi in case of severe hypoglycemia. Has hyperlipidemia: Taking atorvastatin  Has vitamin D deficiency: Taking vitamin D3 1000 IU daily. Has hypothyroidism: Taking levothyroxine 125 mcg daily, regularly and properly. Feels tired overall. Weight is table. Denies changes in level of concentration or ability to fall or stay asleep. Denies anxiety, jitteriness, or tremors.   Denies tachycardia or palpitations. Denies constipation or hyperdefecation. Denies temperature intolerances.      Patient Active Problem List   Diagnosis   • Type 1 diabetes mellitus without complication ([address])   • Hypothyroidism   • Hyperlipidemia   • Acquired deformity of foot   • Pain in both feet   • Congenital pes planus of right foot   • Congenital pes planus of left foot   • Leukocytosis   • Class 2 severe obesity due to excess calories with serious comorbidity and body mass index (BMI) of 37.0 to 37.9 in adult [facility])   • Gastroesophageal reflux disease with esophagitis without hemorrhage   • Environmental allergies   • Intractable migraine without aura and without status migrainosus   • Type 1 diabetes mellitus with hyperglycemia (HCC)   • Vitamin D deficiency   • Insulin pump in place   • Hypoglycemia   • Elevated blood pressure reading      Past Medical History:   Diagnosis Date   • COVID-19 virus infection 11/14/2020   • Diabetes ([address])    • Disease of thyroid gland    • Hyperlipidemia     last assessed 5/11/15      Past Surgical History:   Procedure Laterality Date   • APPENDECTOMY     • CARPAL TUNNEL RELEASE Bilateral    • CYST REMOVAL      hand, tendon cyst   • GANGLION CYST EXCISION     • TONSILLECTOMY     • WISDOM TOOTH EXTRACTION        Family History   Problem Relation Age of Onset   • No Known Problems Mother    • Hypertension Father    • Cancer Maternal Grandmother    • Liver cancer Maternal Grandmother    • No Known Problems Sister    • No Known Problems Brother    • No Known Problems Maternal Aunt    • No Known Problems Maternal Uncle    • No Known Problems Paternal Aunt    • No Known Problems Paternal Uncle    • No Known Problems Maternal Grandfather    • No Known Problems Paternal Grandmother    • Diabetes Paternal Grandfather    • Mental illness Neg Hx    • Substance Abuse Neg Hx      Social History     Tobacco Use   • Smoking status: Former     Packs/day: 1.00     Years: 20.00     Total pack years: 20.00 Types: Cigarettes     Start date: 3/28/2013     Quit date: 3/29/2013     Years since quitting: 10.3   • Smokeless tobacco: Never   Substance Use Topics   • Alcohol use: No     Allergies   Allergen Reactions   • Penicillins    • Sulfites - Food Allergy          Current Outpatient Medications:   •  Accu-Chek FastClix Lancets MISC, Use to check blood sugar daily, Disp: 100 each, Rfl: 3  •  atorvastatin (LIPITOR) 20 mg tablet, Take 1 tablet (20 mg total) by mouth daily, Disp: 90 tablet, Rfl: 2  •  Baqsimi Two Pack 3 MG/DOSE POWD, INSERT DEVICE TIP INTO 1  NOSTRIL PUSH DEVICE PLUNGER UNTIL GREEN LINE  DISAPPEARS.  AS NEEDED FOR  HYPOGLYCEMIA, Disp: 2 each, Rfl: 3  •  Cholecalciferol (VITAMIN D3 PO), Take 1,000 Int'l Units by mouth, Disp: , Rfl:   •  Continuous Blood Gluc  (Dexcom G7 ) SAIMA, Use 1 Device continuous, Disp: 1 each, Rfl: 0  •  Continuous Blood Gluc Sensor (Dexcom G6 Sensor) MISC, Use to check blood sugar daily, Disp: 6 each, Rfl: 3  •  Continuous Blood Gluc Transmit (Dexcom G6 Transmitter) MISC, Change every 3 months, Disp: 1 each, Rfl: 3  •  EPINEPHrine (EPIPEN) 0.3 mg/0.3 mL SOAJ, INJECT INTRAMUSCULARLY 1 PEN AS  NEEDED FOR ALLERGIC RESPONSE AS  DIRECTED BY MD. SEEK MEDICAL  HELP AFTER USE., Disp: 6 each, Rfl: 0  •  glucose blood (Contour Next Test) test strip, Use to test blood sugar 5 times a day, Disp: 500 strip, Rfl: 3  •  HumaLOG 100 UNIT/ML injection, INJECT SUBCUTANEOUSLY UP TO 80  UNITS DAILY VIA INSULIN PUMP, Disp: 80 mL, Rfl: 3  •  levothyroxine 125 mcg tablet, TAKE 1 TABLET BY MOUTH  DAILY, Disp: 90 tablet, Rfl: 3  •  Multiple Vitamins-Minerals (CENTRUM SILVER 50+WOMEN PO), Take by mouth, Disp: , Rfl:   •  Nortrel 7/7/7 0.5/0.75/1-35 MG-MCG per tablet, TAKE 1 TABLET BY MOUTH  DAILY, Disp: 84 tablet, Rfl: 3  •  fluticasone (FLONASE) 50 mcg/act nasal spray, 1 spray into each nostril daily (Patient not taking: Reported on 7/17/2023), Disp: 1 mL, Rfl: 0    Review of Systems   See HPI.  All other systems reviewed and are negative. Physical Exam:  Body mass index is 38.4 kg/m². /78 (BP Location: Right arm, Patient Position: Sitting, Cuff Size: Large)   Pulse 81   Ht 5' (1.524 m)   Wt 89.2 kg (196 lb 9.6 oz)   SpO2 97%   BMI 38.40 kg/m²    Wt Readings from Last 3 Encounters:   07/17/23 89.2 kg (196 lb 9.6 oz)   06/19/23 88.5 kg (195 lb)   04/17/23 88.9 kg (196 lb)     Physical Exam  Vitals reviewed. Constitutional:       Appearance: Normal appearance. Cardiovascular:      Rate and Rhythm: Normal rate and regular rhythm. Pulses: Normal pulses. Heart sounds: Normal heart sounds. Pulmonary:      Effort: Pulmonary effort is normal.      Breath sounds: Normal breath sounds. Skin:     General: Skin is warm and dry. Capillary Refill: Capillary refill takes less than 2 seconds. Neurological:      General: No focal deficit present. Mental Status: She is alert and oriented to person, place, and time. Psychiatric:         Mood and Affect: Mood normal.         Behavior: Behavior normal.   Patient's shoes and socks removed. Right Foot/Ankle   Right Foot Inspection  Skin Exam: skin normal and skin intact. No dry skin, no warmth, no callus, no erythema, no maceration, no abnormal color, no pre-ulcer, no ulcer and no callus. Toe Exam: ROM and strength within normal limits. Sensory   Vibration: intact  Monofilament testing: intact    Vascular  Capillary refills: < 3 seconds  The right DP pulse is 2+. Left Foot/Ankle  Left Foot Inspection  Skin Exam: skin normal and skin intact. No dry skin, no warmth, no erythema, no maceration, normal color, no pre-ulcer, no ulcer and no callus. Toe Exam: ROM and strength within normal limits. Sensory   Vibration: intact  Monofilament testing: intact    Vascular  Capillary refills: < 3 seconds  The left DP pulse is 2+.      Assign Risk Category  No deformity present  No loss of protective sensation  No weak pulses  Risk: 0      Labs:   Lab Results   Component Value Date    HGBA1C 7.3 (H) 07/08/2023    HGBA1C 7.1 (H) 04/01/2023    HGBA1C 6.9 (A) 07/05/2022     Lab Results   Component Value Date    CREATININE 0.65 07/08/2023    CREATININE 0.76 05/13/2023    CREATININE 0.75 04/01/2023    BUN 10 07/08/2023     05/22/2016    K 4.9 07/08/2023     07/08/2023    CO2 21 07/08/2023     eGFR   Date Value Ref Range Status   07/08/2023 104 >59 mL/min/1.73 Final     Lab Results   Component Value Date    CHOL 178 04/01/2017    HDL 59 07/08/2023    TRIG 281 (H) 07/08/2023    CHOLHDL 3.5 07/08/2023     Lab Results   Component Value Date    ALT 11 04/01/2023    AST 19 04/01/2023    ALKPHOS 86 05/22/2016    BILITOT 0.2 05/22/2016     Lab Results   Component Value Date    [id number] 1.400 05/22/2016     Lab Results   Component Value Date    FREET4 1.61 07/08/2023       Discussed with the patient and all questioned fully answered. She will call me if any problems arise. Follow-up appointment in 3 months.      Counseled patient on diagnostic results, prognosis, risk and benefit of treatment options, instruction for management, importance of treatment compliance, Risk  factor reduction and impressions    FAIZA Shepard

## 2023-07-17 NOTE — ASSESSMENT & PLAN NOTE
Patient has mildly elevated blood pressure, she feels this is secondary to migraine she has been experiencing. Would like to follow up with primary care and discuss after migraine improves. Reviewed risks of uncontrolled hypertension.

## 2023-07-17 NOTE — ASSESSMENT & PLAN NOTE
Lab Results   Component Value Date    HGBA1C 7.3 (H) 07/08/2023     HGA1C close to goal, has been suffering with migraines and erratic glucose level when she had a trial off of OCP therapy. Follows with primary care for gynecologic care. She recently transitioned to [facility] which she feels has improved accuracy and would be comfortable with automation when available. BGL Reviewed: Continue use of CGM    Treatment regimen:   Basal rate: (MN) 1.3 (3A) 1.9 (8A) 1.8   Insulin to carb ratio: (MN) 30 (3A) 20 (8P) 30  Insulin sensitivity factor: (MN) 5 (3A) 3 (8A) 5 (5P) 4.5 (8P) 5  BG target: 120  Active Insulin time: 4 hr    Discussed risks/complications associated with uncontrolled diabetes including organ involvement, heart attack, stroke, death. Call for blood sugars less than 70 mg/dl or patterns over 250 mg/dl. Discussed symptoms and treatment of hypoglycemia. Routine follow up for diabetic eye and foot exams. Ordered blood work to complete prior to next visit. Follow up in 3 months.

## 2023-07-17 NOTE — ASSESSMENT & PLAN NOTE
Clinically euthyroid. TSH mildly low. Will continue on levothyroxine 125 mcg daily. Repeat thyroid function tests in 3 months.

## 2023-07-17 NOTE — ASSESSMENT & PLAN NOTE
Increased statin therapy to atorvastatin 20 mg daily. Will repeat lipid panel in 3 months. Reviewed lifestyle modification.

## 2023-08-01 DIAGNOSIS — E10.65 TYPE 1 DIABETES MELLITUS WITH HYPERGLYCEMIA (HCC): ICD-10-CM

## 2023-08-02 DIAGNOSIS — E10.65 TYPE 1 DIABETES MELLITUS WITH HYPERGLYCEMIA, WITH LONG-TERM CURRENT USE OF INSULIN (HCC): ICD-10-CM

## 2023-08-02 RX ORDER — LANCETS
EACH MISCELLANEOUS
Qty: 408 EACH | Refills: 3 | Status: SHIPPED | OUTPATIENT
Start: 2023-08-02

## 2023-08-02 RX ORDER — PERPHENAZINE 16 MG/1
TABLET, FILM COATED ORAL
Qty: 500 STRIP | Refills: 3 | Status: SHIPPED | OUTPATIENT
Start: 2023-08-02

## 2023-09-11 DIAGNOSIS — T78.40XD ALLERGY, SUBSEQUENT ENCOUNTER: ICD-10-CM

## 2023-09-12 RX ORDER — EPINEPHRINE 0.3 MG/.3ML
INJECTION SUBCUTANEOUS
Qty: 6 EACH | Refills: 0 | Status: SHIPPED | OUTPATIENT
Start: 2023-09-12

## 2023-10-22 LAB
ALBUMIN SERPL-MCNC: 4.4 G/DL (ref 3.8–4.9)
ALBUMIN/CREAT UR: <9 MG/G CREAT (ref 0–29)
ALBUMIN/GLOB SERPL: 1.7 {RATIO} (ref 1.2–2.2)
ALP SERPL-CCNC: 113 IU/L (ref 44–121)
ALT SERPL-CCNC: 25 IU/L (ref 0–32)
AST SERPL-CCNC: 23 IU/L (ref 0–40)
BILIRUB SERPL-MCNC: 0.3 MG/DL (ref 0–1.2)
BUN SERPL-MCNC: 11 MG/DL (ref 6–24)
BUN/CREAT SERPL: 15 (ref 9–23)
CALCIUM SERPL-MCNC: 9.6 MG/DL (ref 8.7–10.2)
CHLORIDE SERPL-SCNC: 99 MMOL/L (ref 96–106)
CHOLEST SERPL-MCNC: 198 MG/DL (ref 100–199)
CHOLEST/HDLC SERPL: 3.2 RATIO (ref 0–4.4)
CO2 SERPL-SCNC: 22 MMOL/L (ref 20–29)
CREAT SERPL-MCNC: 0.73 MG/DL (ref 0.57–1)
CREAT UR-MCNC: 31.7 MG/DL
EGFR: 97 ML/MIN/1.73
EST. AVERAGE GLUCOSE BLD GHB EST-MCNC: 163 MG/DL
GLOBULIN SER-MCNC: 2.6 G/DL (ref 1.5–4.5)
GLUCOSE SERPL-MCNC: 305 MG/DL (ref 70–99)
HBA1C MFR BLD: 7.3 % (ref 4.8–5.6)
HDLC SERPL-MCNC: 62 MG/DL
LDLC SERPL CALC-MCNC: 106 MG/DL (ref 0–99)
MICROALBUMIN UR-MCNC: <3 UG/ML
POTASSIUM SERPL-SCNC: 5.3 MMOL/L (ref 3.5–5.2)
PROT SERPL-MCNC: 7 G/DL (ref 6–8.5)
SL AMB VLDL CHOLESTEROL CALC: 30 MG/DL (ref 5–40)
SODIUM SERPL-SCNC: 137 MMOL/L (ref 134–144)
T4 FREE SERPL-MCNC: 1.83 NG/DL (ref 0.82–1.77)
TRIGL SERPL-MCNC: 176 MG/DL (ref 0–149)
TSH SERPL DL<=0.005 MIU/L-ACNC: 1.07 UIU/ML (ref 0.45–4.5)

## 2023-11-28 DIAGNOSIS — T78.40XD ALLERGY, SUBSEQUENT ENCOUNTER: ICD-10-CM

## 2023-11-28 DIAGNOSIS — E10.65 TYPE 1 DIABETES MELLITUS WITH HYPERGLYCEMIA (HCC): ICD-10-CM

## 2023-11-28 RX ORDER — GLUCAGON 3 MG/1
POWDER NASAL
Qty: 2 EACH | Refills: 3 | Status: SHIPPED | OUTPATIENT
Start: 2023-11-28

## 2023-11-28 RX ORDER — EPINEPHRINE 0.3 MG/.3ML
INJECTION SUBCUTANEOUS
Qty: 6 EACH | Refills: 0 | Status: SHIPPED | OUTPATIENT
Start: 2023-11-28

## 2023-11-29 ENCOUNTER — OFFICE VISIT (OUTPATIENT)
Dept: ENDOCRINOLOGY | Facility: CLINIC | Age: 53
End: 2023-11-29
Payer: COMMERCIAL

## 2023-11-29 VITALS
HEIGHT: 60 IN | SYSTOLIC BLOOD PRESSURE: 120 MMHG | OXYGEN SATURATION: 96 % | WEIGHT: 203.6 LBS | DIASTOLIC BLOOD PRESSURE: 70 MMHG | HEART RATE: 70 BPM | BODY MASS INDEX: 39.97 KG/M2

## 2023-11-29 DIAGNOSIS — E55.9 VITAMIN D DEFICIENCY: ICD-10-CM

## 2023-11-29 DIAGNOSIS — E03.9 HYPOTHYROIDISM, UNSPECIFIED TYPE: ICD-10-CM

## 2023-11-29 DIAGNOSIS — Z96.41 INSULIN PUMP IN PLACE: ICD-10-CM

## 2023-11-29 DIAGNOSIS — E78.2 MIXED HYPERLIPIDEMIA: ICD-10-CM

## 2023-11-29 DIAGNOSIS — E10.9 TYPE 1 DIABETES MELLITUS WITHOUT COMPLICATION (HCC): Primary | ICD-10-CM

## 2023-11-29 PROCEDURE — 99214 OFFICE O/P EST MOD 30 MIN: CPT | Performed by: NURSE PRACTITIONER

## 2023-11-29 NOTE — PATIENT INSTRUCTIONS
Current Insulin pump settings:  Basal rate: (MN) 1.3 (3A) 1.9 (8A) 1.85   Insulin sensitivity factor:  (MN) 30 (3A) 20 (8P) 30  Insulin to carb ratio:(MN) 5 (3A) 3 (8A) 4.5 (5P) 4.5 (8P) 5  BG target: 120  Active Insulin time: 4 hr

## 2023-11-29 NOTE — PROGRESS NOTES
Established Patient Progress Note      CC: Type 1 Diabetes Mellitus       Impression & Plan:    Problem List Items Addressed This Visit          Endocrine    Type 1 diabetes mellitus without complication ([address]) - Primary       Lab Results   Component Value Date    HGBA1C 7.3 (H) 10/21/2023     HGA1C mildly above range recommend integration with dexcom G7 and Tandem X2 insulin pump with control IQ technology, when available. Treatment regimen:   Slight increase in daytime basal rate and carb ratio at lunch   Current Insulin pump settings:  Basal rate: (MN) 1.3 (3A) 1.9 (8A) 1.85   Insulin sensitivity factor: (MN) 30 (3A) 20 (8P) 30  Insulin to carb ratio:(MN) 5 (3A) 3 (8A) 4.5 (5P) 4.5 (8P) 5  BG target: 120  Active Insulin time: 4 hr    Discussed risks/complications associated with uncontrolled diabetes including organ involvement, heart attack, stroke, death. Advised lifestyle modifications including attention to diet including the amount and types of carbohydrates consumed and regular activity. Call for blood sugars less than 70 mg/dl or patterns over 250 mg/dl. Discussed symptoms and treatment of hypoglycemia. Reviewed risks associated with hypoglycemia. Always carry rapid acting carbohydrates and a glucometer (a way to check your blood sugar). Recommendation for medical identification either bracelet, necklace. Recommendation for glucagon if on insulin. Has unexpired Baqsimi at home. Routine follow up for diabetic eye and foot exams. Ordered blood work to complete prior to next visit. Send glucose logs/CGM download in 1-2 weeks for review    Follow up in 3 months. Relevant Orders    Comprehensive metabolic panel    Hemoglobin A1C    Hypothyroidism     Clinically and biochemically euthyroid. Free T4 mildly above normal range. Will continue on levothyroxine 125 mcg daily will recheck labs in 3 months.          Relevant Orders    TSH, 3rd generation    T4, free- Lab Collect       Other    Hyperlipidemia     Continues on statin therapy. Relevant Orders    Lipid panel- Lab Collect    Vitamin D deficiency     Continues on daily vitamin D. Insulin pump in place     Recommend CGM and pump integration. Will reach out to pump company to see if she is eligible for integration with the Dexcom G7. Orders Placed This Encounter   Procedures    Lipid panel- Lab Collect     This is a patient instruction: This test requires patient fasting for 10-12 hours or longer. Drinking of black coffee or black tea is acceptable. Standing Status:   Future     Number of Occurrences:   1     Standing Expiration Date:   11/29/2024    TSH, 3rd generation     This is a patient instruction: This test is non-fasting. Please drink two glasses of water morning of bloodwork. Standing Status:   Future     Number of Occurrences:   1     Standing Expiration Date:   5/29/2024    T4, free- Lab Collect     Standing Status:   Future     Number of Occurrences:   1     Standing Expiration Date:   11/29/2024    Comprehensive metabolic panel     This is a patient instruction: Patient fasting for 8 hours or longer recommended. Standing Status:   Future     Number of Occurrences:   1     Standing Expiration Date:   11/29/2024    Hemoglobin A1C     Standing Status:   Future     Number of Occurrences:   1     Standing Expiration Date:   11/29/2024       History of Present Illness:   Kirill Bishop is a 52 y.o. femalewith a history of type 1 diabetes with long term use of insulin for approximately 10 years. Denies history of retinopathy, up-to-date with ophthalmology, no history of neuropathy, kidney disease, claudication, heart attack, or stroke. Denies recent illness or hospitalizations. Denies recent severe hypoglycemic or severe hyperglycemic episodes. Home glucose monitoring: are performed regularly with CGM.   Chencho Mancera is struggling with elevated glucose levels in response to dietary trends. Patient is eating primarily protein with nonstarchy vegetables and is experiencing delayed hyperglycemia approximately 2 to 3 hours after meals when she is not consuming carbohydrates other than nonstarchy vegetables. Has been seen by diabetes education 3 times this year and was counseled to start incorporating carbohydrates at all meals however an attempt to lose weight patient has been withholding carbohydrates at meals and has actually gained weight and is very frustrated. Patient may be eligible for integration with Dexcom G7 and tandem control IQ, will reach out to pump company to confirm. Patient has been uncomfortable with integration in the past that she did not trust her Dexcom G6 CGM. CGM Interpretation  Jerzy [employer]   Device used Dexcom G7  Home use   Indication: Type 1 Diabetes   More than 72 hours of data was reviewed. Report to be scanned to chart. Date Range: November 16, 2023- November 29, 2023  Analysis of data:   Average Glucose: 177 mg/dL  GMI: 7.5%    SD : 71 mg/dL   Time in Target Range: 59%   Time Above Range: 24% high, 16% very high   Time Below Range: 1% low, 0% very low   Interpretation of data:   Post-prandial hyperglycemia      Patient is on a Tandem pump prescribed by endocrinology. She has been on a pump for many years. She denies any malfunctioning of the pump. Current Problems with Pump: None     Current Insulin pump settings:  Basal rate: (MN) 1.3 (3A) 1.9 (8A) 1.8   Insulin sensitivity factor: (MN) 30 (3A) 20 (8P) 30  Insulin to carb ratio:(MN) 5 (3A) 3 (8A) 5 (5P) 4.5 (8P) 5  BG target: 120  Active Insulin time: 4 hr     Type of insulin:  Humalog     Backup Plan: Would take 40 units long acting insulin, will check expiration date on insulin pen. Aware that in case of malfunctioning of the pump or unexplained hyperglycemia to use basal and bolus therapy as backup which is prescribed to the patient.  Also notified patient to call clinic and/or pump company if any issues or go to emergency department if signs/symptoms of DKA. Hypoglycemia: Has baqsimi in case of severe hypoglycemia. Has hyperlipidemia: Taking atorvastatin  Has vitamin D deficiency: Taking vitamin D3 1000 IU daily. Has hypothyroidism: Taking levothyroxine 125 mcg daily, regularly and properly. Feels tired overall. Weight is table. Denies changes in level of concentration or ability to fall or stay asleep. Denies anxiety, jitteriness, or tremors. Denies tachycardia or palpitations. Denies constipation or hyperdefecation. Denies temperature intolerances.      Patient Active Problem List   Diagnosis    Type 1 diabetes mellitus without complication (HCC)    Hypothyroidism    Hyperlipidemia    Acquired deformity of foot    Pain in both feet    Congenital pes planus of right foot    Congenital pes planus of left foot    Leukocytosis    Class 2 severe obesity due to excess calories with serious comorbidity and body mass index (BMI) of 37.0 to 37.9 in adult     Gastroesophageal reflux disease with esophagitis without hemorrhage    Environmental allergies    Intractable migraine without aura and without status migrainosus    Type 1 diabetes mellitus with hyperglycemia (HCC)    Vitamin D deficiency    Insulin pump in place    Hypoglycemia    Elevated blood pressure reading      Past Medical History:   Diagnosis Date    COVID-19 virus infection 11/14/2020    Diabetes ([address])     Disease of thyroid gland     Hyperlipidemia     last assessed 5/11/15      Past Surgical History:   Procedure Laterality Date    APPENDECTOMY      CARPAL TUNNEL RELEASE Bilateral     CYST REMOVAL      hand, tendon cyst    GANGLION CYST EXCISION      TONSILLECTOMY      WISDOM TOOTH EXTRACTION        Family History   Problem Relation Age of Onset    No Known Problems Mother     Hypertension Father     No Known Problems Sister     No Known Problems Brother     Thyroid disease unspecified Maternal Aunt     No Known Problems Maternal Uncle     No Known Problems Paternal Aunt     No Known Problems Paternal Uncle     Liver cancer Maternal Grandmother     Frontotemporal dementia Maternal Grandfather     No Known Problems Paternal Grandmother     Diabetes type II Paternal Grandfather     Mental illness Neg Hx     Substance Abuse Neg Hx      Social History     Tobacco Use    Smoking status: Former     Packs/day: 1.00     Years: 20.00     Total pack years: 20.00     Types: Cigarettes     Start date: 3/28/2013     Quit date: 3/29/2013     Years since quitting: 10.6    Smokeless tobacco: Never   Substance Use Topics    Alcohol use: No     Allergies   Allergen Reactions    Penicillins     Sulfites - Food Allergy          Current Outpatient Medications:     Accu-Chek FastClix Lancets MISC, USE TO CHECK BLOOD SUGAR 4  TIMES DAILY, Disp: 408 each, Rfl: 3    atorvastatin (LIPITOR) 20 mg tablet, Take 1 tablet (20 mg total) by mouth daily, Disp: 90 tablet, Rfl: 2    Baqsimi Two Pack 3 MG/DOSE POWD, INSERT DEVICE TIP INTO 1 NOSTRIL PUSH DEVICE PLUNGER UNTIL GREEN  LINE DISAPPEARS AS NEEDED FOR   HYPOGLYCEMIA, Disp: 2 each, Rfl: 3    Cholecalciferol (VITAMIN D3 PO), Take 1,000 Int'l Units by mouth, Disp: , Rfl:     Continuous Blood Gluc  (Dexcom G7 ) SAIMA, Use 1 Device continuous, Disp: 1 each, Rfl: 0    glucose blood (Contour Next Test) test strip, USE TO TEST BLOOD SUGAR 5  TIMES DAILY, Disp: 500 strip, Rfl: 3    HumaLOG 100 UNIT/ML injection, INJECT SUBCUTANEOUSLY UP TO 80  UNITS DAILY VIA INSULIN PUMP, Disp: 80 mL, Rfl: 3    levothyroxine 125 mcg tablet, TAKE 1 TABLET BY MOUTH  DAILY, Disp: 90 tablet, Rfl: 3    Multiple Vitamins-Minerals (CENTRUM SILVER 50+WOMEN PO), Take by mouth, Disp: , Rfl:     Nortrel 7/7/7 0.5/0.75/1-35 MG-MCG per tablet, TAKE 1 TABLET BY MOUTH  DAILY, Disp: 84 tablet, Rfl: 3    Continuous Blood Gluc Sensor (Dexcom G6 Sensor) MISC, Use to check blood sugar daily, Disp: 6 each, Rfl: 3    Continuous Blood Gluc Transmit (Dexcom G6 Transmitter) MISC, Change every 3 months (Patient not taking: Reported on 11/29/2023), Disp: 1 each, Rfl: 3    EPINEPHrine (EPIPEN) 0.3 mg/0.3 mL SOAJ, INJECT INTRAMUSCULARLY 1 PEN AS  NEEDED FOR ALLERGIC RESPONSE AS  DIRECTED BY MD. Fabiola Gonzalez MEDICAL  HELP AFTER USE., Disp: 6 each, Rfl: 0    Review of Systems  See HPI. All other systems reviewed and are negative. Physical Exam:  Body mass index is 39.76 kg/m². /70 (BP Location: Left arm, Patient Position: Sitting, Cuff Size: Large)   Pulse 70   Ht 5' (1.524 m)   Wt 92.4 kg (203 lb 9.6 oz)   SpO2 96%   BMI 39.76 kg/m²    Wt Readings from Last 3 Encounters:   11/29/23 92.4 kg (203 lb 9.6 oz)   07/17/23 89.2 kg (196 lb 9.6 oz)   06/19/23 88.5 kg (195 lb)        Physical Exam  Vitals reviewed. Constitutional:       Appearance: Nontoxic-appearing. Has obesity. Cardiovascular:      Rate and Rhythm: Normal rate and regular rhythm. Pulses: Normal pulses. Heart sounds: Normal heart sounds. Pulmonary:      Effort: Pulmonary effort is normal.      Breath sounds: Normal breath sounds. Skin:     General: Skin is warm and dry. Capillary Refill: Capillary refill takes less than 2 seconds. Neurological:      General: No focal deficit present. Mental Status: She is alert and oriented to person, place, and time.    Psychiatric:         Mood and Affect: Mood normal.         Behavior: Behavior normal.       Labs:   Lab Results   Component Value Date    HGBA1C 7.3 (H) 10/21/2023    HGBA1C 7.3 (H) 07/08/2023    HGBA1C 7.1 (H) 04/01/2023     Lab Results   Component Value Date    CREATININE 0.73 10/21/2023    CREATININE 0.65 07/08/2023    CREATININE 0.76 05/13/2023    BUN 11 10/21/2023     05/22/2016    K 5.3 (H) 10/21/2023    CL 99 10/21/2023    CO2 22 10/21/2023     eGFR   Date Value Ref Range Status   10/21/2023 97 >59 mL/min/1.73 Final     Lab Results   Component Value Date    CHOL 178 04/01/2017    HDL 62 10/21/2023 TRIG 176 (H) 10/21/2023    CHOLHDL 3.2 10/21/2023     Lab Results   Component Value Date    ALT 25 10/21/2023    AST 23 10/21/2023    ALKPHOS 86 05/22/2016    BILITOT 0.2 05/22/2016     Lab Results   Component Value Date    RMJ6HIVMJRUY 1.400 05/22/2016     Lab Results   Component Value Date    FREET4 1.83 (H) 10/21/2023     Discussed with the patient and all questioned fully answered. She will call me if any problems arise. Follow-up appointment in 3 months. Counseled patient on diagnostic results, prognosis, risk and benefit of treatment options, instruction for management, importance of treatment compliance, Risk  factor reduction and impressions    Patient Instructions   Current Insulin pump settings:  Basal rate: (MN) 1.3 (3A) 1.9 (8A) 1.85   Insulin sensitivity factor:  (MN) 30 (3A) 20 (8P) 30  Insulin to carb ratio:(MN) 5 (3A) 3 (8A) 4.5 (5P) 4.5 (8P) 5  BG target: 120  Active Insulin time: 4 hr       FAIZA Foley

## 2023-11-29 NOTE — ASSESSMENT & PLAN NOTE
Lab Results   Component Value Date    HGBA1C 7.3 (H) 10/21/2023       HGA1C mildly above range recommend integration with dexcom G7 and Tandem X2 insulin pump with control [employer] technology, when available. Treatment regimen:   Slight increase in daytime basal rate and carb ratio at lunch   Current Insulin pump settings:  Basal rate: (MN) 1.3 (3A) 1.9 (8A) 1.85   Insulin sensitivity factor: (MN) 30 (3A) 20 (8P) 30  Insulin to carb ratio:(MN) 5 (3A) 3 (8A) 4.5 (5P) 4.5 (8P) 5  BG target: 120  Active Insulin time: 4 hr    Discussed risks/complications associated with uncontrolled diabetes including organ involvement, heart attack, stroke, death. Advised lifestyle modifications including attention to diet including the amount and types of carbohydrates consumed and regular activity. Call for blood sugars less than 70 mg/dl or patterns over 250 mg/dl. Discussed symptoms and treatment of hypoglycemia. Reviewed risks associated with hypoglycemia. Always carry rapid acting carbohydrates and a glucometer (a way to check your blood sugar). Recommendation for medical identification either bracelet, necklace. Recommendation for glucagon if on insulin. Has unexpired Baqsimi at home. Routine follow up for diabetic eye and foot exams. Ordered blood work to complete prior to next visit. Send glucose logs/CGM download in 1-2 weeks for review    Follow up in 3 months.

## 2023-11-29 NOTE — ASSESSMENT & PLAN NOTE
Recommend CGM and pump integration. Will reach out to pump company to see if she is eligible for integration with the Dexcom G7.

## 2023-11-29 NOTE — ASSESSMENT & PLAN NOTE
Clinically and biochemically euthyroid. Free T4 mildly above normal range. Will continue on levothyroxine 125 mcg daily will recheck labs in 3 months.

## 2024-01-03 ENCOUNTER — NURSE TRIAGE (OUTPATIENT)
Age: 54
End: 2024-01-03

## 2024-01-03 NOTE — TELEPHONE ENCOUNTER
"Patient with abdominal pain for the past two weeks. She states the pain is in the center of her stomach, denies pain radiating. Patient is Type 1 diabetic, her most recent sugar is 158. Denies vomiting and diarrhea. She c/o nausea.   Patient scheduled for an appointment tomorrow.     Reason for Disposition   [1] MODERATE pain (e.g., interferes with normal activities) AND [2] pain comes and goes (cramps) AND [3] present > 24 hours  (Exception: pain with Vomiting or Diarrhea - see that Guideline)    Answer Assessment - Initial Assessment Questions  1. LOCATION: \"Where does it hurt?\"       Middle of stomach    2. RADIATION: \"Does the pain shoot anywhere else?\" (e.g., chest, back)      Denies    3. ONSET: \"When did the pain begin?\" (e.g., minutes, hours or days ago)       Two weeks ago    4. SUDDEN: \"Gradual or sudden onset?\"      Gradual    5. PATTERN \"Does the pain come and go, or is it constant?\"     - If constant: \"Is it getting better, staying the same, or worsening?\"       (Note: Constant means the pain never goes away completely; most serious pain is constant and it progresses)      - If intermittent: \"How long does it last?\" \"Do you have pain now?\"      (Note: Intermittent means the pain goes away completely between bouts)        6. SEVERITY: \"How bad is the pain?\"  (e.g., Scale 1-10; mild, moderate, or severe)    - MODERATE (4-7): interferes with normal activities or awakens from sleep, tender to touch           7. RECURRENT SYMPTOM: \"Have you ever had this type of stomach pain before?\" If Yes, ask: \"When was the last time?\" and \"What happened that time?\"         8. CAUSE: \"What do you think is causing the stomach pain?\"      Unknown    9. RELIEVING/AGGRAVATING FACTORS: \"What makes it better or worse?\" (e.g., movement, antacids, bowel movement)      Pepto bismol    10. OTHER SYMPTOMS: \"Has there been any vomiting, diarrhea, constipation, or urine problems?\"        Nausea    11. PREGNANCY: \"Is there any chance " "you are pregnant?\" \"When was your last menstrual period?\"        N/A    Protocols used: Abdominal Pain - Female-ADULT-AH    "

## 2024-01-04 ENCOUNTER — OFFICE VISIT (OUTPATIENT)
Dept: FAMILY MEDICINE CLINIC | Facility: CLINIC | Age: 54
End: 2024-01-04
Payer: COMMERCIAL

## 2024-01-04 VITALS
TEMPERATURE: 96.6 F | WEIGHT: 204 LBS | BODY MASS INDEX: 40.05 KG/M2 | HEIGHT: 60 IN | HEART RATE: 90 BPM | RESPIRATION RATE: 16 BRPM | OXYGEN SATURATION: 98 % | SYSTOLIC BLOOD PRESSURE: 130 MMHG | DIASTOLIC BLOOD PRESSURE: 90 MMHG

## 2024-01-04 DIAGNOSIS — K29.00 ACUTE GASTRITIS, PRESENCE OF BLEEDING UNSPECIFIED, UNSPECIFIED GASTRITIS TYPE: Primary | ICD-10-CM

## 2024-01-04 DIAGNOSIS — E10.65 TYPE 1 DIABETES MELLITUS WITH HYPERGLYCEMIA (HCC): ICD-10-CM

## 2024-01-04 PROCEDURE — 99214 OFFICE O/P EST MOD 30 MIN: CPT | Performed by: STUDENT IN AN ORGANIZED HEALTH CARE EDUCATION/TRAINING PROGRAM

## 2024-01-04 RX ORDER — FAMOTIDINE 20 MG/1
20 TABLET, FILM COATED ORAL DAILY
Qty: 30 TABLET | Refills: 1 | Status: SHIPPED | OUTPATIENT
Start: 2024-01-04 | End: 2024-03-04

## 2024-01-04 NOTE — PROGRESS NOTES
Name: Layla Chaves      : 1970      MRN: [id number]  Encounter Provider: Jackie Fernandez MD  Encounter Date: 2024   Encounter department: [facility]    Assessment & Plan     1. Acute gastritis, presence of bleeding unspecified, unspecified gastritis type  -Most likely secondary to excess consumption of fatty and sugary foods over the holidays  -     famotidine (PEPCID) 20 mg tablet; Take 1 tablet (20 mg total) by mouth daily  --8 week trial pepcid, patient notes she took omeprazole last time and this did not agree with her, notes abdominal pain is overall improving  -Avoid foods that can worsen gastritis such as caffeine, chocolate, tomatoes, processed foods, and spicy foods  -Follow up in 2 weeks      2. Type 1 diabetes mellitus with hyperglycemia (HCC)  Assessment & Plan:    Lab Results   Component Value Date    HGBA1C 7.3 (H) 10/21/2023     -Stable, continue current medical regimen           Subjective      HPI    Patient presents with abdominal pain. She notes she has pain in her stomach after she eats. She is a Type 1 Diabetic. She notes any food intake causes her to have a dull pain. Bowel movements are formed and normal. Notes when her blood sugar is high she does have reflux and she takes pepto bismol. No alcohol intake. Notes some minimal sweet intake. Gallbladder is intact. Denies nausea and vomiting. Pain has improved. Blood sugars are well controlled.     Review of Systems   Constitutional:  Positive for activity change and appetite change. Negative for chills, fatigue and fever.   HENT:  Negative for congestion, postnasal drip, rhinorrhea, sinus pressure and sinus pain.    Respiratory:  Negative for cough, shortness of breath and wheezing.    Cardiovascular:  Negative for chest pain, palpitations and leg swelling.   Gastrointestinal:  Positive for abdominal pain. Negative for constipation, diarrhea, nausea and vomiting.   Musculoskeletal:  Negative for myalgias.    Neurological:  Negative for weakness, light-headedness and headaches.   Psychiatric/Behavioral:  The patient is not nervous/anxious.        Current Outpatient Medications on File Prior to Visit   Medication Sig   • Accu-Chek FastClix Lancets MISC USE TO CHECK BLOOD SUGAR 4  TIMES DAILY   • atorvastatin (LIPITOR) 20 mg tablet Take 1 tablet (20 mg total) by mouth daily   • Baqsimi Two Pack 3 MG/DOSE POWD INSERT DEVICE TIP INTO 1 NOSTRIL PUSH DEVICE PLUNGER UNTIL GREEN  LINE DISAPPEARS AS NEEDED FOR   HYPOGLYCEMIA   • Cholecalciferol (VITAMIN D3 PO) Take 1,000 Int'l Units by mouth   • Continuous Blood Gluc  (Dexcom G7 ) SAIMA Use 1 Device continuous   • Continuous Blood Gluc Sensor (Dexcom G6 Sensor) MISC Use to check blood sugar daily   • EPINEPHrine (EPIPEN) 0.3 mg/0.3 mL SOAJ INJECT INTRAMUSCULARLY 1 PEN AS  NEEDED FOR ALLERGIC RESPONSE AS  DIRECTED BY MD. SEEK MEDICAL  HELP AFTER USE.   • glucose blood (Contour Next Test) test strip USE TO TEST BLOOD SUGAR 5  TIMES DAILY   • HumaLOG 100 UNIT/ML injection INJECT SUBCUTANEOUSLY UP TO 80  UNITS DAILY VIA INSULIN PUMP   • levothyroxine 125 mcg tablet TAKE 1 TABLET BY MOUTH  DAILY   • Multiple Vitamins-Minerals (CENTRUM SILVER 50+WOMEN PO) Take by mouth   • Nortrel 7/7/7 0.5/0.75/1-35 MG-MCG per tablet TAKE 1 TABLET BY MOUTH  DAILY   • Continuous Blood Gluc Transmit (Dexcom G6 Transmitter) MISC Change every 3 months (Patient not taking: Reported on 11/29/2023)       Objective     /90 (BP Location: Right arm, Patient Position: Sitting, Cuff Size: Large)   Pulse 90   Temp (!) 96.6 °F (35.9 °C) (Temporal)   Resp 16   Ht 5' (1.524 m)   Wt 92.5 kg (204 lb)   SpO2 98%   BMI 39.84 kg/m²     Physical Exam  Constitutional:       Appearance: Normal appearance.   HENT:      Head: Normocephalic and atraumatic.   Abdominal:      General: Abdomen is flat. Bowel sounds are normal. There is no distension.      Palpations: Abdomen is soft.      Tenderness:  There is abdominal tenderness in the epigastric area.   Neurological:      General: No focal deficit present.      Mental Status: She is alert and oriented to person, place, and time.   Psychiatric:         Mood and Affect: Mood normal.         Behavior: Behavior normal.         Thought Content: Thought content normal.         Judgment: Judgment normal.       Jackie Fernandez MD

## 2024-01-04 NOTE — ASSESSMENT & PLAN NOTE
Lab Results   Component Value Date    HGBA1C 7.3 (H) 10/21/2023     -Stable, continue current medical regimen

## 2024-01-15 ENCOUNTER — TELEPHONE (OUTPATIENT)
Dept: NEPHROLOGY | Facility: CLINIC | Age: 54
End: 2024-01-15

## 2024-01-15 NOTE — TELEPHONE ENCOUNTER
Patient left voicemail for endocrinology to leave phone number for [facility]. Phone number is [phone].

## 2024-01-16 ENCOUNTER — NURSE TRIAGE (OUTPATIENT)
Age: 54
End: 2024-01-16

## 2024-01-16 ENCOUNTER — TELEPHONE (OUTPATIENT)
Age: 54
End: 2024-01-16

## 2024-01-16 NOTE — TELEPHONE ENCOUNTER
Pt was calling to inform Dr. Fernandez that she is feeling a lot better! And just wanted to keep the doctor informed.

## 2024-01-20 DIAGNOSIS — Z30.41 ORAL CONTRACEPTIVE PILL SURVEILLANCE: ICD-10-CM

## 2024-01-22 RX ORDER — NORETHINDRONE AND ETHINYL ESTRADIOL 7 DAYS X 3
KIT ORAL
Qty: 84 TABLET | Refills: 0 | Status: SHIPPED | OUTPATIENT
Start: 2024-01-22

## 2024-02-03 DIAGNOSIS — E78.2 MIXED HYPERLIPIDEMIA: ICD-10-CM

## 2024-02-03 DIAGNOSIS — E10.65 TYPE 1 DIABETES MELLITUS WITH HYPERGLYCEMIA (HCC): ICD-10-CM

## 2024-02-05 RX ORDER — ATORVASTATIN CALCIUM 20 MG/1
20 TABLET, FILM COATED ORAL DAILY
Qty: 90 TABLET | Refills: 3 | Status: SHIPPED | OUTPATIENT
Start: 2024-02-05

## 2024-02-05 RX ORDER — INSULIN LISPRO 100 [IU]/ML
INJECTION, SOLUTION INTRAVENOUS; SUBCUTANEOUS
Qty: 80 ML | Refills: 3 | Status: SHIPPED | OUTPATIENT
Start: 2024-02-05

## 2024-02-07 ENCOUNTER — APPOINTMENT (OUTPATIENT)
Dept: RADIOLOGY | Facility: CLINIC | Age: 54
End: 2024-02-07
Payer: COMMERCIAL

## 2024-02-07 ENCOUNTER — OFFICE VISIT (OUTPATIENT)
Age: 54
End: 2024-02-07
Payer: COMMERCIAL

## 2024-02-07 VITALS
HEART RATE: 88 BPM | DIASTOLIC BLOOD PRESSURE: 80 MMHG | HEIGHT: 60 IN | SYSTOLIC BLOOD PRESSURE: 134 MMHG | WEIGHT: 204 LBS | BODY MASS INDEX: 40.05 KG/M2

## 2024-02-07 DIAGNOSIS — G89.29 CHRONIC PAIN OF BOTH ANKLES: ICD-10-CM

## 2024-02-07 DIAGNOSIS — M79.671 BILATERAL FOOT PAIN: Primary | ICD-10-CM

## 2024-02-07 DIAGNOSIS — M79.671 BILATERAL FOOT PAIN: ICD-10-CM

## 2024-02-07 DIAGNOSIS — M79.672 BILATERAL FOOT PAIN: ICD-10-CM

## 2024-02-07 DIAGNOSIS — M25.572 CHRONIC PAIN OF BOTH ANKLES: ICD-10-CM

## 2024-02-07 DIAGNOSIS — M19.079 ARTHRITIS OF FOOT: ICD-10-CM

## 2024-02-07 DIAGNOSIS — M25.571 SINUS TARSITIS OF RIGHT FOOT: ICD-10-CM

## 2024-02-07 DIAGNOSIS — M76.821 POSTERIOR TIBIALIS TENDINITIS OF BOTH LOWER EXTREMITIES: ICD-10-CM

## 2024-02-07 DIAGNOSIS — M76.822 POSTERIOR TIBIALIS TENDINITIS OF BOTH LOWER EXTREMITIES: ICD-10-CM

## 2024-02-07 DIAGNOSIS — M79.672 BILATERAL FOOT PAIN: Primary | ICD-10-CM

## 2024-02-07 DIAGNOSIS — M25.571 CHRONIC PAIN OF BOTH ANKLES: ICD-10-CM

## 2024-02-07 PROCEDURE — 99204 OFFICE O/P NEW MOD 45 MIN: CPT | Performed by: STUDENT IN AN ORGANIZED HEALTH CARE EDUCATION/TRAINING PROGRAM

## 2024-02-07 PROCEDURE — 73610 X-RAY EXAM OF ANKLE: CPT

## 2024-02-07 PROCEDURE — 73630 X-RAY EXAM OF FOOT: CPT

## 2024-02-07 RX ORDER — MELOXICAM 15 MG/1
15 TABLET ORAL DAILY
Qty: 30 TABLET | Refills: 0 | Status: SHIPPED | OUTPATIENT
Start: 2024-02-07

## 2024-02-07 NOTE — PATIENT INSTRUCTIONS
Purchase a supportive pair of sneakers such as Jorge, Ramiro, Sapatony, New Balance.  Some qualities to look for is that the shoe should bend only where the toes bend, the sole should not be too flimsy, it should have a wide toe box and a somewhat stiff heel support. Purchase a supportive over-the-counter pair of inserts such as Superfeet, powersteps, [employer].

## 2024-02-07 NOTE — PROGRESS NOTES
This patient was seen on 2/7/2024.    My role is Foot , Ankle, and Wound Specialist    ASSESSMENT     Diagnoses and all orders for this visit:    Bilateral foot pain  -     X-ray ankle left 3+ views  -     X-ray ankle right 3+ views; Future  -     X-ray foot left 3+ views; Future  -     X-ray foot right 3+ views; Future    Chronic pain of both ankles  -     X-ray ankle left 3+ views  -     X-ray ankle right 3+ views; Future  -     X-ray foot left 3+ views; Future  -     X-ray foot right 3+ views; Future    Posterior tibialis tendinitis of both lower extremities  -     meloxicam (Mobic) 15 mg tablet; Take 1 tablet (15 mg total) by mouth daily  -     Ankle Cude ankle/Ankle Brace    Arthritis of foot  -     meloxicam (Mobic) 15 mg tablet; Take 1 tablet (15 mg total) by mouth daily  -     Ankle Cude ankle/Ankle Brace    Sinus tarsitis of right foot         Problem List Items Addressed This Visit          Musculoskeletal and Integument    Arthritis of foot    Relevant Medications    meloxicam (Mobic) 15 mg tablet    Other Relevant Orders    Ankle Cude ankle/Ankle Brace    Posterior tibialis tendinitis of both lower extremities    Relevant Medications    meloxicam (Mobic) 15 mg tablet    Other Relevant Orders    Ankle Cude ankle/Ankle Brace     Other Visit Diagnoses       Bilateral foot pain    -  Primary    Relevant Orders    X-ray ankle left 3+ views    X-ray ankle right 3+ views    X-ray foot left 3+ views    X-ray foot right 3+ views    Chronic pain of both ankles        Relevant Orders    X-ray ankle left 3+ views    X-ray ankle right 3+ views    X-ray foot left 3+ views    X-ray foot right 3+ views    Sinus tarsitis of right foot              PLAN  -Patient was educated regarding their condition.  -Recommend ASO brace to right foot  -Educated patient on proper footwear, OTC orthotics  -Rx meloxicam  -Last hemoglobin A1c from 10/21/2023 reviewed: 7.3%  -Comprehensive metabolic panel from 10/21/2023 reviewed: Random  glucose 305 mg/dL which is greatly above normal limits, BUN, creatinine, EGFR within normal limits.  -RTC 4-weeks    X-ray from 2/7/2024 of right foot interpreted independently: No acute osseous abnormality noted.  No abnormalities noted within the soft tissues.  I do note slightly decreased joint space at the talonavicular and subtalar joints.  Ankle joint space appears to be within normal limits except for the area of the lateral gutter which might be slightly decreased.  Dorsal osteophyte formation noted at the talonavicular joint on lateral view    X-ray from 2/7/2024 of the right ankle interpreted independently: No acute osseous abnormality noted.  No abnormalities noted within the soft tissues.Ankle joint space appears to be within normal limits except for the area of the lateral gutter which might be slightly decreased.    X-ray from 2/7/2024 of the left foot interpreted independently:No acute osseous abnormality noted.  No abnormalities noted within the soft tissues.  I do note slightly decreased joint space at the talonavicular joint.    X-ray from 2/7/2024 of the left ankle interpreted independently: No acute osseous abnormality noted.  No abnormalities noted within the soft tissues.    SUBJECTIVE    Chief Complaint:  Bilateral foot pain     Patient ID: Layla Chaves     2/7/2024: Layla is a pleasant 53-year-old female who presents today with bilateral ankle pain, she states that the right is worse than the left.  She states that this has been going on for years.  She states that she uses Voltaren gel at home which does help slightly.  She states that the pain is present all the time with both activity and rest, however it is worse at the end of the day compared to the beginning.  Other than her Voltaren she has not attempted any other treatment.  Also of note the patient is a type I diabetic, she was diagnosed with this about 10 years ago.        The following portions of the patient's history were  reviewed and updated as appropriate: allergies, current medications, past family history, past medical history, past social history, past surgical history and problem list.    Review of Systems   Constitutional: Negative.    HENT: Negative.     Respiratory: Negative.     Cardiovascular: Negative.    Gastrointestinal: Negative.    Musculoskeletal:  Positive for arthralgias and myalgias.   Skin: Negative.    Neurological: Negative.          OBJECTIVE      /80   Pulse 88   Ht 5' (1.524 m)   Wt 92.5 kg (204 lb)   BMI 39.84 kg/m²        Physical Exam  Constitutional:       Appearance: Normal appearance.   HENT:      Head: Normocephalic and atraumatic.   Eyes:      General:         Right eye: No discharge.         Left eye: No discharge.   Cardiovascular:      Rate and Rhythm: Normal rate and regular rhythm.      Pulses:           Dorsalis pedis pulses are 2+ on the right side and 2+ on the left side.        Posterior tibial pulses are 2+ on the right side and 2+ on the left side.   Pulmonary:      Effort: Pulmonary effort is normal.      Breath sounds: Normal breath sounds.   Skin:     General: Skin is warm.      Capillary Refill: Capillary refill takes less than 2 seconds.   Neurological:      Sensory: Sensation is intact. No sensory deficit.         MSK:  -Pain on palpation to bilateral posterior tibial tendon at the infra malleoli area.  Pain dorsally along the course of the talonavicular joint bilaterally.  Pain with sagittal plane motion of the talonavicular joint of the right foot.  Pain to the sinus tarsi and lateral ankle gutter bilaterally  -Active range of motion lesser digits intact  -First MTPJ range of motion within normal limits bilaterally  -Ankle dorsiflexion less than 10 degrees with knee extended, this is improved with knee flexion  -Single-leg heel rise: Able to perform with minimal pain bilaterally  -MMT:    -Tibialis anterior 5/5   -Tibialis posterior 5/5   -Peroneals 5/5   -Triceps surae  5/5    Biomechanical Exam of LE:    Standing exam: 1st MTPJ ROM WNL. Toes purchase floor adequately with no digital deformity noted. Medial arch height WNL. Resting calcaneal stance position about 2deg valgus. Heels invert with heel rise. Forefoot is in proper alignment compared to hindfoot in the transverse and sagittal planes. Tibiotalar joint is in rectus position with no lateral bowing of the Achilles tendon noted.     STJ ROM WNL b/l, heel inversion is approximately 20° on right and 20° on left; heel eversion is approximately 10° on right and 10° on left; neutral calcaneal stance position is 0°   1st ray ROM WNL b/l, able to dorsiflex/plantarflex b/l    Gait analysis: WNL with normal angle and base. At midstance the foot is pronating. Early heel off is present during ambulation    Gross deformity noted:  None, rectus foot    Derm:  -No lesions, abrasions, or open wounds noted  -No noted interdigital maceration, peeling, malodor  -No callus formation noted on exam

## 2024-02-25 LAB
ALBUMIN SERPL-MCNC: 4.5 G/DL (ref 3.8–4.9)
ALBUMIN/GLOB SERPL: 1.8 {RATIO} (ref 1.2–2.2)
ALP SERPL-CCNC: 112 IU/L (ref 44–121)
ALT SERPL-CCNC: 15 IU/L (ref 0–32)
AST SERPL-CCNC: 16 IU/L (ref 0–40)
BILIRUB SERPL-MCNC: <0.2 MG/DL (ref 0–1.2)
BUN SERPL-MCNC: 11 MG/DL (ref 6–24)
BUN/CREAT SERPL: 15 (ref 9–23)
CALCIUM SERPL-MCNC: 9.5 MG/DL (ref 8.7–10.2)
CHLORIDE SERPL-SCNC: 100 MMOL/L (ref 96–106)
CHOLEST SERPL-MCNC: 176 MG/DL (ref 100–199)
CHOLEST/HDLC SERPL: 3.2 RATIO (ref 0–4.4)
CO2 SERPL-SCNC: 19 MMOL/L (ref 20–29)
CREAT SERPL-MCNC: 0.71 MG/DL (ref 0.57–1)
EGFR: 100 ML/MIN/1.73
EST. AVERAGE GLUCOSE BLD GHB EST-MCNC: 163 MG/DL
GLOBULIN SER-MCNC: 2.5 G/DL (ref 1.5–4.5)
GLUCOSE SERPL-MCNC: 165 MG/DL (ref 70–99)
HBA1C MFR BLD: 7.3 % (ref 4.8–5.6)
HDLC SERPL-MCNC: 55 MG/DL
LDLC SERPL CALC-MCNC: 77 MG/DL (ref 0–99)
LDLC SERPL DIRECT ASSAY-MCNC: 74 MG/DL (ref 0–99)
POTASSIUM SERPL-SCNC: 4.9 MMOL/L (ref 3.5–5.2)
PROT SERPL-MCNC: 7 G/DL (ref 6–8.5)
SL AMB VLDL CHOLESTEROL CALC: 44 MG/DL (ref 5–40)
SODIUM SERPL-SCNC: 136 MMOL/L (ref 134–144)
T4 FREE SERPL-MCNC: 1.5 NG/DL (ref 0.82–1.77)
TRIGL SERPL-MCNC: 271 MG/DL (ref 0–149)
TSH SERPL DL<=0.005 MIU/L-ACNC: 3.42 UIU/ML (ref 0.45–4.5)

## 2024-03-07 ENCOUNTER — OFFICE VISIT (OUTPATIENT)
Age: 54
End: 2024-03-07
Payer: COMMERCIAL

## 2024-03-07 ENCOUNTER — OFFICE VISIT (OUTPATIENT)
Dept: ENDOCRINOLOGY | Facility: CLINIC | Age: 54
End: 2024-03-07
Payer: COMMERCIAL

## 2024-03-07 VITALS
DIASTOLIC BLOOD PRESSURE: 93 MMHG | WEIGHT: 206 LBS | BODY MASS INDEX: 40.44 KG/M2 | SYSTOLIC BLOOD PRESSURE: 167 MMHG | HEART RATE: 89 BPM | HEIGHT: 60 IN

## 2024-03-07 VITALS
HEIGHT: 60 IN | BODY MASS INDEX: 40.44 KG/M2 | OXYGEN SATURATION: 99 % | DIASTOLIC BLOOD PRESSURE: 80 MMHG | HEART RATE: 82 BPM | WEIGHT: 206 LBS | SYSTOLIC BLOOD PRESSURE: 122 MMHG

## 2024-03-07 DIAGNOSIS — E78.2 MIXED HYPERLIPIDEMIA: ICD-10-CM

## 2024-03-07 DIAGNOSIS — E55.9 VITAMIN D DEFICIENCY: ICD-10-CM

## 2024-03-07 DIAGNOSIS — E03.9 HYPOTHYROIDISM, UNSPECIFIED TYPE: ICD-10-CM

## 2024-03-07 DIAGNOSIS — M79.672 BILATERAL FOOT PAIN: Primary | ICD-10-CM

## 2024-03-07 DIAGNOSIS — Z96.41 INSULIN PUMP IN PLACE: Primary | ICD-10-CM

## 2024-03-07 DIAGNOSIS — E10.65 TYPE 1 DIABETES MELLITUS WITH HYPERGLYCEMIA (HCC): ICD-10-CM

## 2024-03-07 DIAGNOSIS — M79.671 BILATERAL FOOT PAIN: Primary | ICD-10-CM

## 2024-03-07 DIAGNOSIS — E16.2 HYPOGLYCEMIA: ICD-10-CM

## 2024-03-07 DIAGNOSIS — M76.822 POSTERIOR TIBIALIS TENDINITIS OF BOTH LOWER EXTREMITIES: ICD-10-CM

## 2024-03-07 DIAGNOSIS — M76.821 POSTERIOR TIBIALIS TENDINITIS OF BOTH LOWER EXTREMITIES: ICD-10-CM

## 2024-03-07 PROBLEM — R03.0 ELEVATED BLOOD PRESSURE READING: Status: RESOLVED | Noted: 2023-07-17 | Resolved: 2024-03-07

## 2024-03-07 PROCEDURE — 99214 OFFICE O/P EST MOD 30 MIN: CPT | Performed by: NURSE PRACTITIONER

## 2024-03-07 PROCEDURE — 95251 CONT GLUC MNTR ANALYSIS I&R: CPT | Performed by: NURSE PRACTITIONER

## 2024-03-07 PROCEDURE — 99213 OFFICE O/P EST LOW 20 MIN: CPT | Performed by: STUDENT IN AN ORGANIZED HEALTH CARE EDUCATION/TRAINING PROGRAM

## 2024-03-07 NOTE — ASSESSMENT & PLAN NOTE
Lab Results   Component Value Date    HGBA1C 7.3 (H) 02/24/2024     HGA1C close to goal. Adjustment made to carb ratio.     BGL Reviewed:Call Dexcom for G7 that integrates with CGM.     Treatment regimen:   Current Insulin pump settings:  Basal rate: (MN) 1.3 (3A) 1.9 (8A) 1.85   Insulin sensitivity factor: (MN) 30 (3A) 20 (8P) 30  Insulin to carb ratio:(MN) 5 (3A) 3 (8A) 4 (8P) 5  BG target: 120  Active Insulin time: 4 hr       Discussed risks/complications associated with uncontrolled diabetes including organ involvement, heart attack, stroke, death.    Advised lifestyle modifications including attention to diet including the amount and types of carbohydrates consumed and regular activity.     Call for blood sugars less than 70 mg/dl or patterns over 250 mg/dl.     Discussed symptoms and treatment of hypoglycemia.  Reviewed risks associated with hypoglycemia. Always carry rapid acting carbohydrates and a glucometer (a way to check your blood sugar).    Recommendation for medical identification either bracelet, necklace.    Recommendation for glucagon if on insulin.     Routine follow up for diabetic eye and foot exams.     Ordered blood work to complete prior to next visit.    Send glucose logs/CGM download in 1-2 weeks for review    Follow up in 3 months.

## 2024-03-07 NOTE — ASSESSMENT & PLAN NOTE
Clinically and biochemically euthyroid. Repeat TFTs in 3 months. Continue current levothyroxine dose for now.

## 2024-03-07 NOTE — PROGRESS NOTES
Established Patient Progress Note    Chief Complaint:  Diabetes follow up visit    Impression & Plan:    Problem List Items Addressed This Visit          Endocrine    Hypothyroidism     Clinically and biochemically euthyroid. Repeat TFTs in 3 months. Continue current levothyroxine dose for now.          Relevant Orders    TSH, 3rd generation    T4, free    Type 1 diabetes mellitus with hyperglycemia (HCC)       Lab Results   Component Value Date    HGBA1C 7.3 (H) 02/24/2024     HGA1C close to goal. Adjustment made to carb ratio.     BGL Reviewed:Call Dexcom for G7 that integrates with CGM.     Treatment regimen:   Current Insulin pump settings:  Basal rate: (MN) 1.3 (3A) 1.9 (8A) 1.85   Insulin sensitivity factor: (MN) 30 (3A) 20 (8P) 30  Insulin to carb ratio:(MN) 5 (3A) 3 (8A) 4 (8P) 5  BG target: 120  Active Insulin time: 4 hr       Discussed risks/complications associated with uncontrolled diabetes including organ involvement, heart attack, stroke, death.    Advised lifestyle modifications including attention to diet including the amount and types of carbohydrates consumed and regular activity.     Call for blood sugars less than 70 mg/dl or patterns over 250 mg/dl.     Discussed symptoms and treatment of hypoglycemia.  Reviewed risks associated with hypoglycemia. Always carry rapid acting carbohydrates and a glucometer (a way to check your blood sugar).    Recommendation for medical identification either bracelet, necklace.    Recommendation for glucagon if on insulin.     Routine follow up for diabetic eye and foot exams.     Ordered blood work to complete prior to next visit.    Send glucose logs/CGM download in 1-2 weeks for review    Follow up in 3 months.            Relevant Orders    Hemoglobin A1C    Comprehensive metabolic panel    Hypoglycemia     Has glucagon, unexpired.  Reviewed identification and treatment of hypoglycemia.  Has CGM.             Other    Hyperlipidemia     Continues on statin.  LDL improved to 74.          Vitamin D deficiency     Continues vitamin D supplementation.          Insulin pump in place - Primary     Tandem with G6.             History of Present Illness:   Layla Chaves is a 53 y.o. female with a history of type 1 diabetes with long term use of insulin for approximately 10 years.  Denies history of retinopathy, up-to-date with ophthalmology, no history of neuropathy follows regularly with podiatry, kidney disease, claudication, heart attack, or stroke.  Denies recent illness or hospitalizations. Denies recent severe hypoglycemic or severe hyperglycemic episodes. Home glucose monitoring: are performed regularly with CGM.      CGM Interpretation  Layla Chaves   Device used Dexcom G7  Home use   Indication: Type 1 Diabetes   More than 72 hours of data was reviewed. Report to be scanned to chart.   Date Range: February 23,2024- Mar 7, 2024  Analysis of data:   Average Glucose: 176 mg/dL  GMI: 7.5%          SD : 61 mg/dL   Time in Target Range: 56%   Time Above Range: 31% high, 12% very high   Time Below Range: 1% low, 0% very low   Interpretation of data:   Post-prandial hyperglycemia      Patient is on a Tandem pump prescribed by endocrinology. She has been on a pump for many years.   She denies any malfunctioning of the pump.  Current Problems with Pump: Unable to integrate as she got the wrong G7 sensor from supplier.     Current Insulin pump settings:  Basal rate: (MN) 1.3 (3A) 1.9 (8A) 1.8   Insulin sensitivity factor: (MN) 30 (3A) 20 (8P) 30  Insulin to carb ratio:(MN) 5 (3A) 3 (8A) 4.5 (5P) 4.5 (8P) 5  BG target: 120  Active Insulin time: 4 hr     Type of insulin:  Humalog     Backup Plan: Would take 40 units long acting insulin, will check expiration date on insulin pen.   Aware that in case of malfunctioning of the pump or unexplained hyperglycemia to use basal and bolus therapy as backup which is prescribed to the patient. Also notified patient to call clinic and/or pump  company if any issues or go to emergency department if signs/symptoms of DKA.      Hypoglycemia: Has baqsimi in case of severe hypoglycemia.      Has hyperlipidemia: Taking atorvastatin, compliant  Has vitamin D deficiency: Taking vitamin D3 1000 IU daily.     Has hypothyroidism: Taking levothyroxine 125 mcg daily, regularly and properly. Feels tired overall. Weight is stable  Denies anxiety, jitteriness, or tremors.  Denies tachycardia or palpitations.  Denies constipation or hyperdefecation.  Denies temperature intolerances.     Patient Active Problem List   Diagnosis    Hypothyroidism    Hyperlipidemia    Acquired deformity of foot    Pain in both feet    Congenital pes planus of right foot    Congenital pes planus of left foot    Leukocytosis    Class 2 severe obesity due to excess calories with serious comorbidity and body mass index (BMI) of 37.0 to 37.9 in adult     Gastroesophageal reflux disease with esophagitis without hemorrhage    Environmental allergies    Intractable migraine without aura and without status migrainosus    Type 1 diabetes mellitus with hyperglycemia (HCC)    Vitamin D deficiency    Insulin pump in place    Hypoglycemia    Arthritis of foot    Posterior tibialis tendinitis of both lower extremities      Past Medical History:   Diagnosis Date    COVID-19 virus infection 11/14/2020    Diabetes (HCC)     Diabetes mellitus (HCC) type 1    Disease of thyroid gland     Hyperlipidemia     last assessed 5/11/15      Past Surgical History:   Procedure Laterality Date    APPENDECTOMY      CARPAL TUNNEL RELEASE Bilateral     CYST REMOVAL      hand, tendon cyst    GANGLION CYST EXCISION      TONSILLECTOMY      WISDOM TOOTH EXTRACTION        Family History   Problem Relation Age of Onset    No Known Problems Mother     Hypertension Father     No Known Problems Sister     No Known Problems Brother     Thyroid disease unspecified Maternal Aunt     No Known Problems Maternal Uncle     No Known Problems  Paternal Aunt     No Known Problems Paternal Uncle     Liver cancer Maternal Grandmother     Cancer Maternal Grandmother     Frontotemporal dementia Maternal Grandfather     No Known Problems Paternal Grandmother     Diabetes type II Paternal Grandfather     Diabetes Paternal Grandfather     Mental illness Neg Hx     Substance Abuse Neg Hx      Social History     Tobacco Use    Smoking status: Former     Current packs/day: 0.00     Average packs/day: 1 pack/day for 20.0 years (20.0 ttl pk-yrs)     Types: Cigarettes     Start date: 3/28/2013     Quit date: 3/29/2013     Years since quitting: 10.9    Smokeless tobacco: Never   Substance Use Topics    Alcohol use: No     Allergies   Allergen Reactions    Penicillins     Sulfites - Food Allergy          Current Outpatient Medications:     atorvastatin (LIPITOR) 20 mg tablet, TAKE 1 TABLET BY MOUTH DAILY, Disp: 90 tablet, Rfl: 3    Cholecalciferol (VITAMIN D3 PO), Take 1,000 Int'l Units by mouth, Disp: , Rfl:     Continuous Blood Gluc  (Dexcom G7 ) SAIMA, Use 1 Device continuous, Disp: 1 each, Rfl: 0    Continuous Blood Gluc Sensor (Dexcom G6 Sensor) MISC, Use to check blood sugar daily, Disp: 6 each, Rfl: 3    glucose blood (Contour Next Test) test strip, USE TO TEST BLOOD SUGAR 5  TIMES DAILY, Disp: 500 strip, Rfl: 3    HumaLOG 100 UNIT/ML injection, INJECT SUBCUTANEOUSLY UP TO 80  UNITS DAILY VIA INSULIN PUMP, Disp: 80 mL, Rfl: 3    levothyroxine 125 mcg tablet, TAKE 1 TABLET BY MOUTH  DAILY, Disp: 90 tablet, Rfl: 3    Multiple Vitamins-Minerals (CENTRUM SILVER 50+WOMEN PO), Take by mouth, Disp: , Rfl:     norethindrone-ethinyl estradiol (Nortrel 7/7/7) 0.5/0.75/1-35 MG-MCG per tablet, TAKE 1 TABLET BY MOUTH DAILY, Disp: 84 tablet, Rfl: 0    Accu-Chek FastClix Lancets MISC, USE TO CHECK BLOOD SUGAR 4  TIMES DAILY, Disp: 408 each, Rfl: 3    Baqsimi Two Pack 3 MG/DOSE POWD, INSERT DEVICE TIP INTO 1 NOSTRIL PUSH DEVICE PLUNGER UNTIL GREEN  LINE DISAPPEARS  AS NEEDED FOR   HYPOGLYCEMIA, Disp: 2 each, Rfl: 3    Continuous Blood Gluc Transmit (Dexcom G6 Transmitter) MISC, Change every 3 months (Patient not taking: Reported on 11/29/2023), Disp: 1 each, Rfl: 3    EPINEPHrine (EPIPEN) 0.3 mg/0.3 mL SOAJ, INJECT INTRAMUSCULARLY 1 PEN AS  NEEDED FOR ALLERGIC RESPONSE AS  DIRECTED BY MD. SEEK MEDICAL  HELP AFTER USE., Disp: 6 each, Rfl: 0    famotidine (PEPCID) 20 mg tablet, Take 1 tablet (20 mg total) by mouth daily, Disp: 30 tablet, Rfl: 1    meloxicam (Mobic) 15 mg tablet, Take 1 tablet (15 mg total) by mouth daily (Patient not taking: Reported on 3/7/2024), Disp: 30 tablet, Rfl: 0    Review of Systems  See HPI   All other systems reviewed and are negative.      Physical Exam:  Body mass index is 40.23 kg/m².  /80 (BP Location: Left arm, Patient Position: Sitting, Cuff Size: Large)   Pulse 82   Ht 5' (1.524 m)   Wt 93.4 kg (206 lb)   SpO2 99%   BMI 40.23 kg/m²    Wt Readings from Last 3 Encounters:   03/07/24 93.4 kg (206 lb)   02/07/24 92.5 kg (204 lb)   01/04/24 92.5 kg (204 lb)        Physical Exam  Vitals reviewed.   Constitutional:       Appearance: Normal appearance.   Cardiovascular:      Rate and Rhythm: Normal rate and regular rhythm.      Pulses: Normal pulses.      Heart sounds: Normal heart sounds.   Pulmonary:      Effort: Pulmonary effort is normal.      Breath sounds: Normal breath sounds.   Skin:     General: Skin is warm and dry.      Capillary Refill: Capillary refill takes less than 2 seconds.   Neurological:      General: No focal deficit present.      Mental Status: She is alert and oriented to person, place, and time.   Psychiatric:         Mood and Affect: Mood normal.         Behavior: Behavior normal.     Labs:   Lab Results   Component Value Date    HGBA1C 7.3 (H) 02/24/2024    HGBA1C 7.3 (H) 10/21/2023    HGBA1C 7.3 (H) 07/08/2023     Lab Results   Component Value Date    CREATININE 0.71 02/24/2024    CREATININE 0.73 10/21/2023     CREATININE 0.65 07/08/2023    BUN 11 02/24/2024     05/22/2016    K 4.9 02/24/2024     02/24/2024    CO2 19 (L) 02/24/2024     eGFR   Date Value Ref Range Status   02/24/2024 100 >59 mL/min/1.73 Final     Lab Results   Component Value Date    CHOL 178 04/01/2017    HDL 55 02/24/2024    TRIG 271 (H) 02/24/2024    CHOLHDL 3.2 02/24/2024     Lab Results   Component Value Date    ALT 15 02/24/2024    AST 16 02/24/2024    ALKPHOS 86 05/22/2016    BILITOT 0.2 05/22/2016     Lab Results   Component Value Date    LKE1IUAENRFU 1.400 05/22/2016     Lab Results   Component Value Date    FREET4 1.50 02/24/2024       Discussed with the patient and all questioned fully answered. She will call me if any problems arise.    Follow-up appointment in 3 months.     Counseled patient on diagnostic results, prognosis, risk and benefit of treatment options, instruction for management, importance of treatment compliance, Risk  factor reduction and impressions    FAIZA Andrade

## 2024-03-07 NOTE — PATIENT INSTRUCTIONS
Current Insulin pump settings:  Basal rate: (MN) 1.3 (3A) 1.9 (8A) 1.85   Insulin sensitivity factor: (MN) 30 (3A) 20 (8P) 30  Insulin to carb ratio:(MN) 5 (3A) 3 (8A) 4 (8P) 5  BG target: 120  Active Insulin time: 4 hr

## 2024-03-08 NOTE — PROGRESS NOTES
This patient was seen on 3/7/2024.    My role is Foot , Ankle, and Wound Specialist    ASSESSMENT     Diagnoses and all orders for this visit:    Bilateral foot pain    Posterior tibialis tendinitis of both lower extremities         Problem List Items Addressed This Visit          Musculoskeletal and Integument    Posterior tibialis tendinitis of both lower extremities     Other Visit Diagnoses       Bilateral foot pain    -  Primary          PLAN  -Layla and I discussed her bilateral feet  -She is doing much better today and therefore we will not escalate her level of care  -Continue supportive sneakers with over-the-counter inserts  -Continue ASO brace as needed  -Continue meloxicam as needed  -Continue Voltaren gel as needed  -Most recent hemoglobin A1c from 2/24/2024 reviewed: 7.3%  -Most recent comprehensive metabolic panel from 2/20/2024 reviewed: Random glucose elevated at 165 mg/dL, all other pertinent values within normal limits.  -return to clinic in 3 months, call our office for an earlier appointment should any new or worsening podiatric concerns arise    SUBJECTIVE    Chief Complaint:  Bilateral foot Pain now resolving     Patient ID: Layla Chaves     2/7/2024: Layla is a pleasant 53-year-old female who presents today with bilateral ankle pain, she states that the right is worse than the left.  She states that this has been going on for years.  She states that she uses Voltaren gel at home which does help slightly.  She states that the pain is present all the time with both activity and rest, however it is worse at the end of the day compared to the beginning.  Other than her Voltaren she has not attempted any other treatment.  Also of note the patient is a type I diabetic, she was diagnosed with this about 10 years ago.    3/7/2024: Layla states that she is doing about 40 to 50% better since her last visit.  She states that she has been wearing her brace as needed.  She does not believe the Voltaren  gel has helped her at all.  She states that she has gotten a pair of new balance which she enjoys.        The following portions of the patient's history were reviewed and updated as appropriate: allergies, current medications, past family history, past medical history, past social history, past surgical history and problem list.    Review of Systems   Constitutional: Negative.    HENT: Negative.     Respiratory: Negative.     Cardiovascular: Negative.    Gastrointestinal: Negative.    Musculoskeletal:  Negative for arthralgias and myalgias.   Skin: Negative.    Neurological: Negative.          OBJECTIVE      /93   Pulse 89   Ht 5' (1.524 m)   Wt 93.4 kg (206 lb)   BMI 40.23 kg/m²        Physical Exam  Constitutional:       Appearance: Normal appearance.   HENT:      Head: Normocephalic and atraumatic.   Eyes:      General:         Right eye: No discharge.         Left eye: No discharge.   Cardiovascular:      Rate and Rhythm: Normal rate and regular rhythm.      Pulses:           Dorsalis pedis pulses are 2+ on the right side and 2+ on the left side.        Posterior tibial pulses are 2+ on the right side and 2+ on the left side.   Pulmonary:      Effort: Pulmonary effort is normal.      Breath sounds: Normal breath sounds.   Skin:     General: Skin is warm.      Capillary Refill: Capillary refill takes less than 2 seconds.   Neurological:      Sensory: Sensation is intact. No sensory deficit.         MSK:  -Very mild pain on palpation noted to the left posterior tibial tendon at the insertion site on the navicular.  Other than this no pain on palpation today.  -Active range of motion lesser digits intact  -First MTPJ range of motion within normal limits bilaterally  -Ankle dorsiflexion less than 10 degrees with knee extended, this is improved with knee flexion  -MMT:               -Tibialis anterior 5/5              -Tibialis posterior 5/5              -Peroneals 5/5              -Triceps surae 5/5      Gross deformity noted:  None, rectus foot     Derm:  -No lesions, abrasions, or open wounds noted  -No noted interdigital maceration, peeling, malodor  -No callus formation noted on exam

## 2024-03-17 ENCOUNTER — HOSPITAL ENCOUNTER (EMERGENCY)
Facility: HOSPITAL | Age: 54
Discharge: HOME | End: 2024-03-17
Attending: EMERGENCY MEDICINE
Payer: COMMERCIAL

## 2024-03-17 VITALS
DIASTOLIC BLOOD PRESSURE: 80 MMHG | OXYGEN SATURATION: 98 % | TEMPERATURE: 98.4 F | BODY MASS INDEX: 40.08 KG/M2 | SYSTOLIC BLOOD PRESSURE: 178 MMHG | WEIGHT: 205.25 LBS | HEART RATE: 95 BPM | RESPIRATION RATE: 20 BRPM

## 2024-03-17 DIAGNOSIS — T30.0 BURN: Primary | ICD-10-CM

## 2024-03-17 PROCEDURE — 99283 EMERGENCY DEPT VISIT LOW MDM: CPT

## 2024-03-17 PROCEDURE — 99284 EMERGENCY DEPT VISIT MOD MDM: CPT | Performed by: EMERGENCY MEDICINE

## 2024-03-17 PROCEDURE — 90715 TDAP VACCINE 7 YRS/> IM: CPT | Performed by: EMERGENCY MEDICINE

## 2024-03-17 PROCEDURE — 90471 IMMUNIZATION ADMIN: CPT

## 2024-03-17 RX ORDER — GINSENG 100 MG
1 CAPSULE ORAL ONCE
Status: COMPLETED | OUTPATIENT
Start: 2024-03-17 | End: 2024-03-17

## 2024-03-17 RX ADMIN — TETANUS TOXOID, REDUCED DIPHTHERIA TOXOID AND ACELLULAR PERTUSSIS VACCINE, ADSORBED 0.5 ML: 5; 2.5; 8; 8; 2.5 SUSPENSION INTRAMUSCULAR at 18:45

## 2024-03-17 RX ADMIN — BACITRACIN 1 SMALL APPLICATION: 500 OINTMENT TOPICAL at 18:45

## 2024-03-17 NOTE — ED PROVIDER NOTES
History  Chief Complaint   Patient presents with    Burn     States did not know there was a bag of potatoe chips in the microwave. She smelled plastic burning, grabbed the bag of chips. Burned her fingers and put fingers up to lips to blow on them and she burned her lips     Patient presents for evaluation of burn to her fingers on left hand as well as her upper lip.  Patient states she went the microwave for dinner and did not realize there is a bag of potato chips in it.  She turned on the microwave and smelled the plastic burning.  She opened up the microwave and grabbed the plastic bag of potato chips burning her index finger and thumb on her left hand she then went to blow on her fingers because of the pain and burned her left upper lip.  Patient states she store food and microwave to keep it fresh and did not see the bag when she put her during tonight.  Patient unsure of her last tetanus shot.      History provided by:  Patient   used: No    Burn      Prior to Admission Medications   Prescriptions Last Dose Informant Patient Reported? Taking?   Accu-Chek FastClix Lancets MISC   No No   Sig: USE TO CHECK BLOOD SUGAR 4  TIMES DAILY   Baqsimi Two Pack 3 MG/DOSE POWD   No No   Sig: INSERT DEVICE TIP INTO 1 NOSTRIL PUSH DEVICE PLUNGER UNTIL GREEN  LINE DISAPPEARS AS NEEDED FOR   HYPOGLYCEMIA   Cholecalciferol (VITAMIN D3 PO)  Self Yes No   Sig: Take 1,000 Int'l Units by mouth   Continuous Blood Gluc  (Dexcom G7 ) SAIMA  Self No No   Sig: Use 1 Device continuous   Continuous Blood Gluc Sensor (Dexcom G6 Sensor) MISC  Self No No   Sig: Use to check blood sugar daily   Continuous Blood Gluc Transmit (Dexcom G6 Transmitter) MISC  Self No No   Sig: Change every 3 months   Patient not taking: Reported on 11/29/2023   EPINEPHrine (EPIPEN) 0.3 mg/0.3 mL SOAJ   No No   Sig: INJECT INTRAMUSCULARLY 1 PEN AS  NEEDED FOR ALLERGIC RESPONSE AS  DIRECTED BY MD. SEEK MEDICAL  HELP AFTER USE.    HumaLOG 100 UNIT/ML injection   No No   Sig: INJECT SUBCUTANEOUSLY UP TO 80  UNITS DAILY VIA INSULIN PUMP   Multiple Vitamins-Minerals (CENTRUM SILVER 50+WOMEN PO)  Self Yes No   Sig: Take by mouth   atorvastatin (LIPITOR) 20 mg tablet   No No   Sig: TAKE 1 TABLET BY MOUTH DAILY   famotidine (PEPCID) 20 mg tablet   No No   Sig: Take 1 tablet (20 mg total) by mouth daily   glucose blood (Contour Next Test) test strip   No No   Sig: USE TO TEST BLOOD SUGAR 5  TIMES DAILY   levothyroxine 125 mcg tablet  Self No No   Sig: TAKE 1 TABLET BY MOUTH  DAILY   meloxicam (Mobic) 15 mg tablet   No No   Sig: Take 1 tablet (15 mg total) by mouth daily   Patient not taking: Reported on 3/7/2024   norethindrone-ethinyl estradiol (Nortrel 7/7/7) 0.5/0.75/1-35 MG-MCG per tablet   No No   Sig: TAKE 1 TABLET BY MOUTH DAILY      Facility-Administered Medications: None       Past Medical History:   Diagnosis Date    COVID-19 virus infection 11/14/2020    Diabetes (HCC)     Diabetes mellitus (HCC) type 1    Disease of thyroid gland     Hyperlipidemia     last assessed 5/11/15       Past Surgical History:   Procedure Laterality Date    APPENDECTOMY      CARPAL TUNNEL RELEASE Bilateral     CYST REMOVAL      hand, tendon cyst    GANGLION CYST EXCISION      TONSILLECTOMY      WISDOM TOOTH EXTRACTION         Family History   Problem Relation Age of Onset    No Known Problems Mother     Hypertension Father     No Known Problems Sister     No Known Problems Brother     Thyroid disease unspecified Maternal Aunt     No Known Problems Maternal Uncle     No Known Problems Paternal Aunt     No Known Problems Paternal Uncle     Liver cancer Maternal Grandmother     Cancer Maternal Grandmother     Frontotemporal dementia Maternal Grandfather     No Known Problems Paternal Grandmother     Diabetes type II Paternal Grandfather     Diabetes Paternal Grandfather     Mental illness Neg Hx     Substance Abuse Neg Hx      I have reviewed and agree with the  history as documented.    E-Cigarette/Vaping    E-Cigarette Use Never User      E-Cigarette/Vaping Substances    Nicotine No     THC No     CBD No     Flavoring No     Other No     Unknown No      Social History     Tobacco Use    Smoking status: Former     Current packs/day: 0.00     Average packs/day: 1 pack/day for 20.0 years (20.0 ttl pk-yrs)     Types: Cigarettes     Start date: 3/28/2013     Quit date: 3/29/2013     Years since quitting: 10.9    Smokeless tobacco: Never   Vaping Use    Vaping status: Never Used   Substance Use Topics    Alcohol use: No    Drug use: No       Review of Systems   All other systems reviewed and are negative.      Physical Exam  Physical Exam  Vitals and nursing note reviewed.   Constitutional:       General: She is not in acute distress.  HENT:      Mouth/Throat:      Mouth: Mucous membranes are moist.      Pharynx: Oropharynx is clear.   Cardiovascular:      Rate and Rhythm: Normal rate.   Pulmonary:      Effort: Pulmonary effort is normal. No respiratory distress.   Skin:         Neurological:      General: No focal deficit present.      Mental Status: She is alert and oriented to person, place, and time.         Vital Signs  ED Triage Vitals [03/17/24 1832]   Temperature Pulse Respirations Blood Pressure SpO2   98.4 °F (36.9 °C) 95 20 (!) 178/80 98 %      Temp Source Heart Rate Source Patient Position - Orthostatic VS BP Location FiO2 (%)   Tympanic Monitor Sitting Right arm --      Pain Score       4           Vitals:    03/17/24 1832   BP: (!) 178/80   Pulse: 95   Patient Position - Orthostatic VS: Sitting         Visual Acuity      ED Medications  Medications   tetanus-diphtheria-acellular pertussis (BOOSTRIX) IM injection 0.5 mL (0.5 mL Intramuscular Given 3/17/24 1845)   bacitracin topical ointment 1 small application (1 small application Topical Given 3/17/24 1845)       Diagnostic Studies  Results Reviewed       None                   No orders to display               Procedures  Procedures         ED Course                                             Medical Decision Making  Pulse ox 98% on room air indicating adequate oxygenation.      Discussed local wound care follow-up and when to return to the ER for reevaluation.    Risk  OTC drugs.  Prescription drug management.             Disposition  Final diagnoses:   Burn - 1st and second digit left hand and upper lip     Time reflects when diagnosis was documented in both MDM as applicable and the Disposition within this note       Time User Action Codes Description Comment    3/17/2024  6:43 PM George Hawley Add [T30.0] Burn     3/17/2024  6:43 PM George Hawley Modify [T30.0] Burn 1st and second digit left hand and upper lip          ED Disposition       ED Disposition   Discharge    Condition   Stable    Date/Time   Sun Mar 17, 2024  6:43 PM    Comment   Layla Chaves discharge to home/self care.                   Follow-up Information       Follow up With Specialties Details Why Contact Info Additional Information    Nash Cantu MD Family Medicine In 4 days For wound re-check [address] [facility] [address]  [phone]       [facility] Emergency Medicine  If symptoms worsen [address] [facility] [address]  [phone] [facility], [address], [place], [address]            Discharge Medication List as of 3/17/2024  6:50 PM        CONTINUE these medications which have NOT CHANGED    Details   Accu-Chek FastClix Lancets MISC USE TO CHECK BLOOD SUGAR 4  TIMES DAILY, Normal      atorvastatin (LIPITOR) 20 mg tablet TAKE 1 TABLET BY MOUTH DAILY, Starting Mon 2/5/2024, Normal      Baqsimi Two Pack 3 MG/DOSE POWD INSERT DEVICE TIP INTO 1 NOSTRIL PUSH DEVICE PLUNGER UNTIL GREEN  LINE DISAPPEARS AS NEEDED FOR   HYPOGLYCEMIA, Normal      Cholecalciferol (VITAMIN D3 PO) Take 1,000 Int'l Units by mouth, Historical Med       Continuous Blood Gluc  (Dexcom G7 ) SAIMA Use 1 Device continuous, Starting Mon 3/27/2023, Normal      Continuous Blood Gluc Sensor (Dexcom G6 Sensor) MISC Use to check blood sugar daily, Normal      Continuous Blood Gluc Transmit (Dexcom G6 Transmitter) MISC Change every 3 months, Normal      EPINEPHrine (EPIPEN) 0.3 mg/0.3 mL SOAJ INJECT INTRAMUSCULARLY 1 PEN AS  NEEDED FOR ALLERGIC RESPONSE AS  DIRECTED BY MD. SEEK MEDICAL  HELP AFTER USE., Normal      famotidine (PEPCID) 20 mg tablet Take 1 tablet (20 mg total) by mouth daily, Starting Thu 1/4/2024, Until Mon 3/4/2024, Normal      glucose blood (Contour Next Test) test strip USE TO TEST BLOOD SUGAR 5  TIMES DAILY, Normal      HumaLOG 100 UNIT/ML injection INJECT SUBCUTANEOUSLY UP TO 80  UNITS DAILY VIA INSULIN PUMP, Normal      levothyroxine 125 mcg tablet TAKE 1 TABLET BY MOUTH  DAILY, Normal      meloxicam (Mobic) 15 mg tablet Take 1 tablet (15 mg total) by mouth daily, Starting Wed 2/7/2024, Normal      Multiple Vitamins-Minerals (CENTRUM SILVER 50+WOMEN PO) Take by mouth, Historical Med      norethindrone-ethinyl estradiol (Nortrel 7/7/7) 0.5/0.75/1-35 MG-MCG per tablet TAKE 1 TABLET BY MOUTH DAILY, Normal             No discharge procedures on file.    PDMP Review       None            ED Provider  Electronically Signed by             George Hawley DO  03/17/24 1910

## 2024-03-20 ENCOUNTER — OFFICE VISIT (OUTPATIENT)
Dept: FAMILY MEDICINE CLINIC | Facility: CLINIC | Age: 54
End: 2024-03-20
Payer: COMMERCIAL

## 2024-03-20 VITALS
HEIGHT: 60 IN | HEART RATE: 84 BPM | RESPIRATION RATE: 16 BRPM | TEMPERATURE: 98.2 F | WEIGHT: 203.6 LBS | DIASTOLIC BLOOD PRESSURE: 80 MMHG | OXYGEN SATURATION: 99 % | BODY MASS INDEX: 39.97 KG/M2 | SYSTOLIC BLOOD PRESSURE: 130 MMHG

## 2024-03-20 DIAGNOSIS — E10.65 TYPE 1 DIABETES MELLITUS WITH HYPERGLYCEMIA (HCC): ICD-10-CM

## 2024-03-20 DIAGNOSIS — T30.0 BURN: Primary | ICD-10-CM

## 2024-03-20 PROCEDURE — 99213 OFFICE O/P EST LOW 20 MIN: CPT | Performed by: NURSE PRACTITIONER

## 2024-03-20 NOTE — PROGRESS NOTES
Assessment/Plan:    1. Burn    2. Type 1 diabetes mellitus with hyperglycemia (HCC)  Assessment & Plan:  Stable, no issues at this time.   Discussed importance of wound care and infection prevention.   Lab Results   Component Value Date    HGBA1C 7.3 (H) 02/24/2024         Reviewed hospital notes from the ED. Advised pt to allow area to heal. Avoid picking at any scabbing. Verbalized understanding. Advise that she can continue triple antibiotic ointment as tolerated. May use maderma on the upper lip area once completed healed.           Return if symptoms worsen or fail to improve.    Subjective:      Patient ID: Layla Chaves is a 53 y.o. female.    Chief Complaint   Patient presents with    ER follow up     Burn on lip, L.Kate/YUE       Layla is a 53 year old female who presents to the office for an ER follow up s/p burn. Reports that she was microwaving her dinner and noted a bag of chips in the back of the microwave that she grabbed and burned her hand on the melted plastic. States the plastic was stuck to her hand she accidentally placed her hand on her mouth due to being in pain and she received further burns on her upper lip. Reports that she has been using antibiotic ointment. Denies fever, chills, discharge or signs of infection at this time.         The following portions of the patient's history were reviewed and updated as appropriate: allergies, current medications, past family history, past medical history, past social history, past surgical history and problem list.    Review of Systems   Constitutional:  Negative for chills and fatigue.   Skin:  Positive for wound.        Burn on left hand 2 finger tips  Burn on upper lip         Current Outpatient Medications   Medication Sig Dispense Refill    Accu-Chek FastClix Lancets MISC USE TO CHECK BLOOD SUGAR 4  TIMES DAILY 408 each 3    atorvastatin (LIPITOR) 20 mg tablet TAKE 1 TABLET BY MOUTH DAILY 90 tablet 3    Baqsimi Two Pack 3 MG/DOSE POWD INSERT  DEVICE TIP INTO 1 NOSTRIL PUSH DEVICE PLUNGER UNTIL GREEN  LINE DISAPPEARS AS NEEDED FOR   HYPOGLYCEMIA 2 each 3    Cholecalciferol (VITAMIN D3 PO) Take 1,000 Int'l Units by mouth      Continuous Blood Gluc  (Dexcom G7 ) SAIMA Use 1 Device continuous 1 each 0    EPINEPHrine (EPIPEN) 0.3 mg/0.3 mL SOAJ INJECT INTRAMUSCULARLY 1 PEN AS  NEEDED FOR ALLERGIC RESPONSE AS  DIRECTED BY MD. SEEK MEDICAL  HELP AFTER USE. 6 each 0    glucose blood (Contour Next Test) test strip USE TO TEST BLOOD SUGAR 5  TIMES DAILY 500 strip 3    HumaLOG 100 UNIT/ML injection INJECT SUBCUTANEOUSLY UP TO 80  UNITS DAILY VIA INSULIN PUMP 80 mL 3    levothyroxine 125 mcg tablet TAKE 1 TABLET BY MOUTH  DAILY 90 tablet 3    Multiple Vitamins-Minerals (CENTRUM SILVER 50+WOMEN PO) Take by mouth      norethindrone-ethinyl estradiol (Nortrel 7/7/7) 0.5/0.75/1-35 MG-MCG per tablet TAKE 1 TABLET BY MOUTH DAILY 84 tablet 0    Continuous Blood Gluc Sensor (Dexcom G6 Sensor) MISC Use to check blood sugar daily (Patient not taking: Reported on 3/20/2024) 6 each 3    Continuous Blood Gluc Transmit (Dexcom G6 Transmitter) MISC Change every 3 months (Patient not taking: Reported on 11/29/2023) 1 each 3     No current facility-administered medications for this visit.       Objective:    /80   Pulse 84   Temp 98.2 °F (36.8 °C) (Temporal)   Resp 16   Ht 5' (1.524 m)   Wt 92.4 kg (203 lb 9.6 oz)   LMP  (LMP Unknown)   SpO2 99%   BMI 39.76 kg/m²        Physical Exam  Vitals reviewed.   Constitutional:       Appearance: Normal appearance.   HENT:      Head: Normocephalic and atraumatic.     Cardiovascular:      Rate and Rhythm: Regular rhythm.      Heart sounds: Normal heart sounds.   Pulmonary:      Breath sounds: Normal breath sounds.   Skin:     Findings: Burn present.      Comments: 2 burn blisters noted on 2 fingertips of left hand - dry and intact without significant fluid retained   Neurological:      Mental Status: She is alert  and oriented to person, place, and time.   Psychiatric:         Mood and Affect: Mood normal.                FAIZA Adkins

## 2024-03-21 ENCOUNTER — TELEPHONE (OUTPATIENT)
Dept: OTHER | Facility: OTHER | Age: 54
End: 2024-03-21

## 2024-03-21 NOTE — TELEPHONE ENCOUNTER
Patient called saying that she had a office visit and she had forgotten what the doctor had told her she can use on the burn on her lip. Please advise.

## 2024-03-22 NOTE — ASSESSMENT & PLAN NOTE
Stable, no issues at this time.   Discussed importance of wound care and infection prevention.   Lab Results   Component Value Date    HGBA1C 7.3 (H) 02/24/2024

## 2024-03-23 DIAGNOSIS — Z30.41 ORAL CONTRACEPTIVE PILL SURVEILLANCE: ICD-10-CM

## 2024-03-25 RX ORDER — NORETHINDRONE AND ETHINYL ESTRADIOL 7 DAYS X 3
KIT ORAL
Qty: 84 TABLET | Refills: 3 | Status: SHIPPED | OUTPATIENT
Start: 2024-03-25

## 2024-04-23 ENCOUNTER — TELEPHONE (OUTPATIENT)
Dept: ENDOCRINOLOGY | Facility: CLINIC | Age: 54
End: 2024-04-23

## 2024-04-28 DIAGNOSIS — E03.9 ACQUIRED HYPOTHYROIDISM: ICD-10-CM

## 2024-04-29 RX ORDER — LEVOTHYROXINE SODIUM 0.12 MG/1
TABLET ORAL
Qty: 90 TABLET | Refills: 1 | Status: SHIPPED | OUTPATIENT
Start: 2024-04-29

## 2024-05-20 ENCOUNTER — TELEPHONE (OUTPATIENT)
Age: 54
End: 2024-05-20

## 2024-05-20 NOTE — TELEPHONE ENCOUNTER
Pt called in stating she would like to start getting her mammograms done at [facility] but she needs an order placed in her chart.

## 2024-05-21 DIAGNOSIS — Z12.31 ENCOUNTER FOR SCREENING MAMMOGRAM FOR BREAST CANCER: Primary | ICD-10-CM

## 2024-05-26 LAB
ALBUMIN SERPL-MCNC: 4.4 G/DL (ref 3.8–4.9)
ALBUMIN/GLOB SERPL: 1.7 {RATIO} (ref 1.2–2.2)
ALP SERPL-CCNC: 116 IU/L (ref 44–121)
ALT SERPL-CCNC: 14 IU/L (ref 0–32)
AST SERPL-CCNC: 18 IU/L (ref 0–40)
BILIRUB SERPL-MCNC: <0.2 MG/DL (ref 0–1.2)
BUN SERPL-MCNC: 11 MG/DL (ref 6–24)
BUN/CREAT SERPL: 14 (ref 9–23)
CALCIUM SERPL-MCNC: 9.7 MG/DL (ref 8.7–10.2)
CHLORIDE SERPL-SCNC: 101 MMOL/L (ref 96–106)
CO2 SERPL-SCNC: 19 MMOL/L (ref 20–29)
CREAT SERPL-MCNC: 0.77 MG/DL (ref 0.57–1)
EGFR: 90 ML/MIN/1.73
EST. AVERAGE GLUCOSE BLD GHB EST-MCNC: 160 MG/DL
GLOBULIN SER-MCNC: 2.6 G/DL (ref 1.5–4.5)
GLUCOSE SERPL-MCNC: 108 MG/DL (ref 70–99)
HBA1C MFR BLD: 7.2 % (ref 4.8–5.6)
POTASSIUM SERPL-SCNC: 5.1 MMOL/L (ref 3.5–5.2)
PROT SERPL-MCNC: 7 G/DL (ref 6–8.5)
SODIUM SERPL-SCNC: 137 MMOL/L (ref 134–144)
T4 FREE SERPL-MCNC: 1.56 NG/DL (ref 0.82–1.77)
TSH SERPL DL<=0.005 MIU/L-ACNC: 3.68 UIU/ML (ref 0.45–4.5)

## 2024-06-06 ENCOUNTER — OFFICE VISIT (OUTPATIENT)
Age: 54
End: 2024-06-06
Payer: COMMERCIAL

## 2024-06-06 VITALS
DIASTOLIC BLOOD PRESSURE: 77 MMHG | HEIGHT: 60 IN | SYSTOLIC BLOOD PRESSURE: 122 MMHG | BODY MASS INDEX: 39.85 KG/M2 | HEART RATE: 84 BPM | WEIGHT: 203 LBS

## 2024-06-06 DIAGNOSIS — M25.572 CHRONIC PAIN OF BOTH ANKLES: ICD-10-CM

## 2024-06-06 DIAGNOSIS — G89.29 CHRONIC PAIN OF BOTH ANKLES: ICD-10-CM

## 2024-06-06 DIAGNOSIS — M25.571 CHRONIC PAIN OF BOTH ANKLES: ICD-10-CM

## 2024-06-06 DIAGNOSIS — E10.65 TYPE 1 DIABETES MELLITUS WITH HYPERGLYCEMIA (HCC): ICD-10-CM

## 2024-06-06 DIAGNOSIS — M76.821 POSTERIOR TIBIALIS TENDINITIS OF BOTH LOWER EXTREMITIES: Primary | ICD-10-CM

## 2024-06-06 DIAGNOSIS — M76.822 POSTERIOR TIBIALIS TENDINITIS OF BOTH LOWER EXTREMITIES: Primary | ICD-10-CM

## 2024-06-06 PROCEDURE — 99213 OFFICE O/P EST LOW 20 MIN: CPT | Performed by: STUDENT IN AN ORGANIZED HEALTH CARE EDUCATION/TRAINING PROGRAM

## 2024-06-06 NOTE — PROGRESS NOTES
This patient was seen on  6/6/24 .    My role is Foot , Ankle, and Wound Specialist    ASSESSMENT     Diagnoses and all orders for this visit:    Posterior tibialis tendinitis of both lower extremities    Chronic pain of both ankles    Type 1 diabetes mellitus with hyperglycemia (HCC)         Problem List Items Addressed This Visit          Endocrine    Type 1 diabetes mellitus with hyperglycemia (HCC)       Musculoskeletal and Integument    Posterior tibialis tendinitis of both lower extremities - Primary     Other Visit Diagnoses       Chronic pain of both ankles                PLAN  -At-risk diabetic foot examination performed.  -I educated patient on proper foot care including wearing white socks, refraining from walking barefoot, and self-examining feet every day.   -Continue supportive footwear  -Most recent hemoglobin A1c from 5/25/2024 reviewed: 7.2%  -Most recent comprehensive metabolic panel from 5/25/2024 reviewed: 108 mg/dL, other than this all values within normal limits  -Diabetic Risk category 0  -RTC in 1-year    SUBJECTIVE    Chief Complaint:  Here for at-risk diabetic foot care     Patient ID: Layla Chaves is a 54 y.o. female.    2/7/2024: Layla is a pleasant 53-year-old female who presents today with bilateral ankle pain, she states that the right is worse than the left.  She states that this has been going on for years.  She states that she uses Voltaren gel at home which does help slightly.  She states that the pain is present all the time with both activity and rest, however it is worse at the end of the day compared to the beginning.  Other than her Voltaren she has not attempted any other treatment.  Also of note the patient is a type I diabetic, she was diagnosed with this about 10 years ago.    6/6/2024: Layla states that she is doing much better today.  She states that her right foot is good but her left foot does hurt a little bit.  She states that this is not unbearable and that she has  even been able to get back to exercising.        The following portions of the patient's history were reviewed and updated as appropriate: allergies, current medications, past family history, past medical history, past social history, past surgical history and problem list.    Review of Systems   Constitutional: Negative.    HENT: Negative.     Respiratory: Negative.     Cardiovascular: Negative.    Gastrointestinal: Negative.    Musculoskeletal:  Negative for arthralgias and myalgias.   Skin: Negative.    Neurological: Negative.          OBJECTIVE      /77   Pulse 84   Ht 5' (1.524 m)   Wt 92.1 kg (203 lb)   BMI 39.65 kg/m²        Physical Exam  Constitutional:       Appearance: Normal appearance.   HENT:      Head: Normocephalic and atraumatic.   Eyes:      General:         Right eye: No discharge.         Left eye: No discharge.   Cardiovascular:      Rate and Rhythm: Normal rate and regular rhythm.      Pulses: no weak pulses.           Dorsalis pedis pulses are 2+ on the right side and 2+ on the left side.        Posterior tibial pulses are 2+ on the right side and 2+ on the left side.   Pulmonary:      Effort: Pulmonary effort is normal.      Breath sounds: Normal breath sounds.   Feet:      Right foot:      Skin integrity: No ulcer, skin breakdown, erythema, warmth, callus or dry skin.      Left foot:      Skin integrity: No ulcer, skin breakdown, erythema, warmth, callus or dry skin.   Skin:     General: Skin is warm.      Capillary Refill: Capillary refill takes less than 2 seconds.   Neurological:      Sensory: Sensation is intact. No sensory deficit.           Patient's shoes and socks removed.    Right Foot/Ankle   Right Foot Inspection  Skin Exam: skin normal and skin intact. No dry skin, no warmth, no callus, no erythema, no maceration, no abnormal color, no pre-ulcer, no ulcer and no callus.     Toe Exam: ROM and strength within normal limits. No swelling, no tenderness, erythema and  no  right toe deformity    Sensory   Monofilament testing: intact    Vascular  Capillary refills: < 3 seconds  The right DP pulse is 2+. The right PT pulse is 2+.     Right Toe  - Comprehensive Exam  Ecchymosis: none  Arch: normal  Hammertoes: absent  Claw Toes: absent  Swelling: none   Tenderness: none       Left Foot/Ankle  Left Foot Inspection  Skin Exam: skin normal and skin intact. No dry skin, no warmth, no erythema, no maceration, normal color, no pre-ulcer, no ulcer and no callus.     Toe Exam: ROM and strength within normal limits. No swelling, no tenderness, no erythema and no left toe deformity.     Sensory   Monofilament testing: intact    Vascular  Capillary refills: < 3 seconds  The left DP pulse is 2+. The left PT pulse is 2+.     Left Toe  - Comprehensive Exam  Ecchymosis: none  Arch: normal  Hammertoes: absent  Claw toes: absent  Swelling: none   Tenderness: none       Assign Risk Category  No deformity present  No loss of protective sensation  No weak pulses  Risk: 0

## 2024-06-12 ENCOUNTER — TELEPHONE (OUTPATIENT)
Dept: ENDOCRINOLOGY | Facility: CLINIC | Age: 54
End: 2024-06-12

## 2024-06-12 ENCOUNTER — OFFICE VISIT (OUTPATIENT)
Dept: ENDOCRINOLOGY | Facility: CLINIC | Age: 54
End: 2024-06-12
Payer: COMMERCIAL

## 2024-06-12 VITALS
WEIGHT: 204 LBS | SYSTOLIC BLOOD PRESSURE: 122 MMHG | OXYGEN SATURATION: 99 % | HEIGHT: 60 IN | BODY MASS INDEX: 40.05 KG/M2 | HEART RATE: 89 BPM | DIASTOLIC BLOOD PRESSURE: 76 MMHG

## 2024-06-12 DIAGNOSIS — E78.2 MIXED HYPERLIPIDEMIA: ICD-10-CM

## 2024-06-12 DIAGNOSIS — E16.2 HYPOGLYCEMIA: ICD-10-CM

## 2024-06-12 DIAGNOSIS — E55.9 VITAMIN D DEFICIENCY: ICD-10-CM

## 2024-06-12 DIAGNOSIS — E03.9 HYPOTHYROIDISM, UNSPECIFIED TYPE: Primary | ICD-10-CM

## 2024-06-12 DIAGNOSIS — E10.65 TYPE 1 DIABETES MELLITUS WITH HYPERGLYCEMIA (HCC): ICD-10-CM

## 2024-06-12 PROCEDURE — 95251 CONT GLUC MNTR ANALYSIS I&R: CPT | Performed by: NURSE PRACTITIONER

## 2024-06-12 PROCEDURE — 99214 OFFICE O/P EST MOD 30 MIN: CPT | Performed by: NURSE PRACTITIONER

## 2024-06-12 NOTE — TELEPHONE ENCOUNTER
Patient requesting a letter to my chart for clearance to receive a tattoo. Please advise thru my chart.

## 2024-06-12 NOTE — ASSESSMENT & PLAN NOTE
Lab Results   Component Value Date    HGBA1C 7.2 (H) 05/25/2024     HGA1C close to goal.    Treatment regimen: Continue current regimen. Consider control IQ    Discussed risks/complications associated with uncontrolled diabetes including organ involvement, heart attack, stroke, death.    Advised lifestyle modifications including attention to diet including the amount and types of carbohydrates consumed and regular activity.     Call for blood sugars less than 70 mg/dl or patterns over 250 mg/dl.     Discussed symptoms and treatment of hypoglycemia.  Reviewed risks associated with hypoglycemia. Always carry rapid acting carbohydrates and a glucometer (a way to check your blood sugar).    Recommendation for medical identification either bracelet, necklace.    Recommendation for glucagon if on insulin.     Routine follow up for diabetic eye and foot exams.     Ordered blood work to complete prior to next visit.    Send glucose logs/CGM download in 1-2 weeks for review    Follow up in 3 months.     
Clinically and euthyroid on levothyroxine 125 mcg daily.  Recommend recheck tsh and free t4 in 3 months.   
Continue on statin therapy.   
Continue vitamin D supplementation.   
Reviewed identification and treatment of hypoglycemia.    Has glucagon in case of severe lows.    Continues on CGM.   
Admission

## 2024-06-12 NOTE — PROGRESS NOTES
Established Patient Progress Note    Chief Complaint:  Diabetes follow up visit    Impression & Plan:    Problem List Items Addressed This Visit          Endocrine    Hypothyroidism - Primary     Clinically and euthyroid on levothyroxine 125 mcg daily.  Recommend recheck tsh and free t4 in 3 months.          Relevant Orders    TSH, 3rd generation    T4, free    Type 1 diabetes mellitus with hyperglycemia (HCC)       Lab Results   Component Value Date    HGBA1C 7.2 (H) 05/25/2024     HGA1C close to goal.    Treatment regimen: Continue current regimen. Consider control IQ    Discussed risks/complications associated with uncontrolled diabetes including organ involvement, heart attack, stroke, death.    Advised lifestyle modifications including attention to diet including the amount and types of carbohydrates consumed and regular activity.     Call for blood sugars less than 70 mg/dl or patterns over 250 mg/dl.     Discussed symptoms and treatment of hypoglycemia.  Reviewed risks associated with hypoglycemia. Always carry rapid acting carbohydrates and a glucometer (a way to check your blood sugar).    Recommendation for medical identification either bracelet, necklace.    Recommendation for glucagon if on insulin.     Routine follow up for diabetic eye and foot exams.     Ordered blood work to complete prior to next visit.    Send glucose logs/CGM download in 1-2 weeks for review    Follow up in 3 months.            Relevant Orders    Comprehensive metabolic panel    Hemoglobin A1C    Albumin / creatinine urine ratio    Hypoglycemia     Reviewed identification and treatment of hypoglycemia.    Has glucagon in case of severe lows.    Continues on CGM.             Other    Hyperlipidemia     Continue on statin therapy.          Vitamin D deficiency     Continue vitamin D supplementation.             History of Present Illness:   Layla Chaves is a 54 y.o. female with a history of type 1 diabetes with long term use of  insulin for approximately 10 years.  Denies history of retinopathy, up-to-date with ophthalmology, no history of neuropathy follows regularly with podiatry, kidney disease, claudication, heart attack, or stroke.  Denies recent illness or hospitalizations. Denies recent severe hypoglycemic or severe hyperglycemic episodes requiring medical attention. Home glucose monitoring: are performed regularly with CGM.      CGM Interpretation  Layla Silveriol   Device used Dexcom G7  Home use   Indication: Type 1 Diabetes   More than 72 hours of data was reviewed. Report to be scanned to chart.   Date Range: May 30, 2024- June 12, 2024  Analysis of data:   Average Glucose: 165 mg/dL  GMI: 7.3%          SD : 56 mg/dL   Time in Target Range: 67%   Time Above Range: 23% high, 9% very high   Time Below Range: <1% low, <1% very low   Interpretation of data:   Post-prandial hyperglycemia      Patient is on a Tandem pump prescribed by endocrinology. She has been on a pump for many years.   She denies any malfunctioning of the pump.  Current Problems with Pump: Unable to integrate due to internet connection.     Current Insulin pump settings:  Basal rate: (MN) 1.3 (3A) 1.9 (8A) 1.85 (5P) 1.8   Insulin sensitivity factor: (MN) 30 (3A) 20 (8P) 30  Insulin to carb ratio:(MN) 5 (3A) 3 (8A) 4.0 (5P) 4.5 (8P) 5  BG target: 120  Active Insulin time: 4 hr     Type of insulin:  Humalog     Backup Plan: Would take 40 units long acting insulin, will check expiration date on insulin pen.   Aware that in case of malfunctioning of the pump or unexplained hyperglycemia to use basal and bolus therapy as backup which is prescribed to the patient. Also notified patient to call clinic and/or pump company if any issues or go to emergency department if signs/symptoms of DKA.      Hypoglycemia: Has baqsimi in case of severe hypoglycemia.      Has hyperlipidemia: Taking atorvastatin, compliant    Has vitamin D deficiency: Taking vitamin D3 1000 IU daily.      Has hypothyroidism: Taking levothyroxine 125 mcg daily, regularly and properly. Denies symptoms consistent with hypo/hyperglycemia.      Patient Active Problem List   Diagnosis    Hypothyroidism    Hyperlipidemia    Acquired deformity of foot    Pain in both feet    Congenital pes planus of right foot    Congenital pes planus of left foot    Leukocytosis    Class 2 severe obesity due to excess calories with serious comorbidity and body mass index (BMI) of 37.0 to 37.9 in adult ([facility])    Gastroesophageal reflux disease with esophagitis without hemorrhage    Environmental allergies    Intractable migraine without aura and without status migrainosus    Type 1 diabetes mellitus with hyperglycemia ([facility])    Vitamin D deficiency    Insulin pump in place    Hypoglycemia    Arthritis of foot    Posterior tibialis tendinitis of both lower extremities      Past Medical History:   Diagnosis Date    COVID-19 virus infection 11/14/2020    Diabetes ([facility])     Diabetes mellitus ([facility]) type 1    Disease of thyroid gland     Hyperlipidemia     last assessed 5/11/15      Past Surgical History:   Procedure Laterality Date    APPENDECTOMY      CARPAL TUNNEL RELEASE Bilateral     CYST REMOVAL      hand, tendon cyst    GANGLION CYST EXCISION      TONSILLECTOMY      WISDOM TOOTH EXTRACTION        Family History   Problem Relation Age of Onset    No Known Problems Mother     Hypertension Father     No Known Problems Sister     No Known Problems Brother     Thyroid disease unspecified Maternal Aunt     No Known Problems Maternal Uncle     No Known Problems Paternal Aunt     No Known Problems Paternal Uncle     Liver cancer Maternal Grandmother     Cancer Maternal Grandmother     Frontotemporal dementia Maternal Grandfather     No Known Problems Paternal Grandmother     Diabetes type II Paternal Grandfather     Diabetes Paternal Grandfather     Mental illness Neg Hx     Substance Abuse Neg Hx      Social History     Tobacco Use    Smoking  status: Former     Current packs/day: 0.00     Average packs/day: 1 pack/day for 20.0 years (20.0 ttl pk-yrs)     Types: Cigarettes     Start date: 3/28/2013     Quit date: 3/29/2013     Years since quittin.2     Passive exposure: Never    Smokeless tobacco: Never   Substance Use Topics    Alcohol use: No     Allergies   Allergen Reactions    Penicillins     Sulfites - Food Allergy          Current Outpatient Medications:     Accu-Chek FastClix Lancets MISC, USE TO CHECK BLOOD SUGAR 4  TIMES DAILY, Disp: 408 each, Rfl: 3    atorvastatin (LIPITOR) 20 mg tablet, TAKE 1 TABLET BY MOUTH DAILY, Disp: 90 tablet, Rfl: 3    Cholecalciferol (VITAMIN D3 PO), Take 1,000 Int'l Units by mouth, Disp: , Rfl:     Continuous Blood Gluc  (Dexcom G7 ) SAIMA, Use 1 Device continuous, Disp: 1 each, Rfl: 0    HumaLOG 100 UNIT/ML injection, INJECT SUBCUTANEOUSLY UP TO 80  UNITS DAILY VIA INSULIN PUMP, Disp: 80 mL, Rfl: 3    Multiple Vitamins-Minerals (CENTRUM SILVER 50+WOMEN PO), Take by mouth, Disp: , Rfl:     Nortrel 7/7/7 0.5/0.75/1-35 MG-MCG per tablet, TAKE 1 TABLET BY MOUTH DAILY, Disp: 84 tablet, Rfl: 3    Baqsimi Two Pack 3 MG/DOSE POWD, INSERT DEVICE TIP INTO 1 NOSTRIL PUSH DEVICE PLUNGER UNTIL GREEN  LINE DISAPPEARS AS NEEDED FOR   HYPOGLYCEMIA, Disp: 2 each, Rfl: 3    EPINEPHrine (EPIPEN) 0.3 mg/0.3 mL SOAJ, INJECT INTRAMUSCULARLY 1 PEN AS  NEEDED FOR ALLERGIC RESPONSE AS  DIRECTED BY MD. SEEK MEDICAL  HELP AFTER USE., Disp: 6 each, Rfl: 0    glucose blood (Contour Next Test) test strip, USE TO TEST BLOOD SUGAR 5  TIMES DAILY, Disp: 500 strip, Rfl: 3    levothyroxine 125 mcg tablet, TAKE 1 TABLET BY MOUTH DAILY, Disp: 90 tablet, Rfl: 1    Review of Systems  See HPI.   All other systems reviewed and are negative.    Physical Exam:  Body mass index is 39.84 kg/m².  /76 (BP Location: Left arm, Patient Position: Sitting, Cuff Size: Large)   Pulse 89   Ht 5' (1.524 m)   Wt 92.5 kg (204 lb)   SpO2 99%    BMI 39.84 kg/m²    Wt Readings from Last 3 Encounters:   06/12/24 92.5 kg (204 lb)   06/06/24 92.1 kg (203 lb)   03/20/24 92.4 kg (203 lb 9.6 oz)        Physical Exam  Vitals reviewed.   Constitutional:       Appearance: Normal appearance.   Cardiovascular:      Rate and Rhythm: Normal rate and regular rhythm.      Pulses: Normal pulses.      Heart sounds: Normal heart sounds.   Pulmonary:      Effort: Pulmonary effort is normal.      Breath sounds: Normal breath sounds.   Skin:     General: Skin is warm and dry.      Capillary Refill: Capillary refill takes less than 2 seconds.   Neurological:      General: No focal deficit present.      Mental Status: She is alert and oriented to person, place, and time.   Psychiatric:         Mood and Affect: Mood normal.         Behavior: Behavior normal.     Labs:   Lab Results   Component Value Date    HGBA1C 7.2 (H) 05/25/2024    HGBA1C 7.3 (H) 02/24/2024    HGBA1C 7.3 (H) 10/21/2023     Lab Results   Component Value Date    CREATININE 0.77 05/25/2024    CREATININE 0.71 02/24/2024    CREATININE 0.73 10/21/2023    BUN 11 05/25/2024     05/22/2016    K 5.1 05/25/2024     05/25/2024    CO2 19 (L) 05/25/2024     eGFR   Date Value Ref Range Status   05/25/2024 90 >59 mL/min/1.73 Final     Lab Results   Component Value Date    CHOL 178 04/01/2017    HDL 55 02/24/2024    TRIG 271 (H) 02/24/2024    CHOLHDL 3.2 02/24/2024     Lab Results   Component Value Date    ALT 14 05/25/2024    AST 18 05/25/2024    ALKPHOS 86 05/22/2016    BILITOT 0.2 05/22/2016     Lab Results   Component Value Date    [id number] 1.400 05/22/2016     Lab Results   Component Value Date    FREET4 1.56 05/25/2024       Discussed with the patient and all questioned fully answered. She will call me if any problems arise.    Follow-up appointment in 3 months.     Counseled patient on diagnostic results, prognosis, risk and benefit of treatment options, instruction for management, importance of treatment  compliance, Risk  factor reduction and impressions    DON AndradeNP

## 2024-06-12 NOTE — TELEPHONE ENCOUNTER
Is this a clearance due to diabetes mellitus? I cannot provide a full medical clearance but only that her glucose levels are reasonable and she needs to be aware of risk for infection, poor wound healing with hyperglycemia. Thanks!

## 2024-06-13 ENCOUNTER — RA CDI HCC (OUTPATIENT)
Dept: OTHER | Facility: HOSPITAL | Age: 54
End: 2024-06-13

## 2024-06-13 NOTE — PROGRESS NOTES
HCC coding opportunities       Chart reviewed, no opportunity found: CHART REVIEWED, NO OPPORTUNITY FOUND        Patients Insurance        Commercial Insurance: [employer]

## 2024-06-20 ENCOUNTER — OFFICE VISIT (OUTPATIENT)
Dept: FAMILY MEDICINE CLINIC | Facility: CLINIC | Age: 54
End: 2024-06-20
Payer: COMMERCIAL

## 2024-06-20 VITALS
HEIGHT: 60 IN | HEART RATE: 88 BPM | RESPIRATION RATE: 16 BRPM | SYSTOLIC BLOOD PRESSURE: 138 MMHG | TEMPERATURE: 97.4 F | BODY MASS INDEX: 40.48 KG/M2 | WEIGHT: 206.2 LBS | DIASTOLIC BLOOD PRESSURE: 70 MMHG | OXYGEN SATURATION: 96 %

## 2024-06-20 DIAGNOSIS — E66.01 CLASS 2 SEVERE OBESITY DUE TO EXCESS CALORIES WITH SERIOUS COMORBIDITY AND BODY MASS INDEX (BMI) OF 37.0 TO 37.9 IN ADULT (HCC): ICD-10-CM

## 2024-06-20 DIAGNOSIS — E03.9 HYPOTHYROIDISM, UNSPECIFIED TYPE: ICD-10-CM

## 2024-06-20 DIAGNOSIS — Z12.31 ENCOUNTER FOR SCREENING MAMMOGRAM FOR BREAST CANCER: ICD-10-CM

## 2024-06-20 DIAGNOSIS — Z23 ENCOUNTER FOR IMMUNIZATION: ICD-10-CM

## 2024-06-20 DIAGNOSIS — E78.2 MIXED HYPERLIPIDEMIA: ICD-10-CM

## 2024-06-20 DIAGNOSIS — Z00.00 ROUTINE GENERAL MEDICAL EXAMINATION AT A HEALTH CARE FACILITY: Primary | ICD-10-CM

## 2024-06-20 DIAGNOSIS — E10.65 TYPE 1 DIABETES MELLITUS WITH HYPERGLYCEMIA (HCC): ICD-10-CM

## 2024-06-20 DIAGNOSIS — G43.009 MIGRAINE WITHOUT AURA AND WITHOUT STATUS MIGRAINOSUS, NOT INTRACTABLE: ICD-10-CM

## 2024-06-20 DIAGNOSIS — K21.00 GASTROESOPHAGEAL REFLUX DISEASE WITH ESOPHAGITIS WITHOUT HEMORRHAGE: ICD-10-CM

## 2024-06-20 PROCEDURE — 99396 PREV VISIT EST AGE 40-64: CPT | Performed by: FAMILY MEDICINE

## 2024-06-20 PROCEDURE — 90471 IMMUNIZATION ADMIN: CPT

## 2024-06-20 PROCEDURE — 90677 PCV20 VACCINE IM: CPT

## 2024-06-20 PROCEDURE — 93000 ELECTROCARDIOGRAM COMPLETE: CPT | Performed by: FAMILY MEDICINE

## 2024-06-20 RX ORDER — NAPROXEN 500 MG/1
500 TABLET ORAL 2 TIMES DAILY WITH MEALS
Qty: 60 TABLET | Refills: 1 | Status: SHIPPED | OUTPATIENT
Start: 2024-06-20

## 2024-06-20 NOTE — PROGRESS NOTES
Depression Screening and Follow-up Plan: Patient was screened for depression during today's encounter. They screened negative with a PHQ-2 score of 0.    FAMILY PRACTICE HEALTH MAINTENANCE OFFICE VISIT  [facility]    NAME: Layla Chaves  AGE: 54 y.o. SEX: female  : 1970     DATE: 2024    Assessment and Plan     Problem List Items Addressed This Visit        Cardiovascular and Mediastinum    Migraine without aura and without status migrainosus, not intractable    Relevant Medications    naproxen (EC NAPROSYN) 500 MG EC tablet       Digestive    Gastroesophageal reflux disease with esophagitis without hemorrhage       Endocrine    Hypothyroidism    Type 1 diabetes mellitus with hyperglycemia (HCC)    Relevant Orders    POCT ECG (Completed)       Other    Hyperlipidemia    Relevant Orders    POCT ECG (Completed)    Class 2 severe obesity due to excess calories with serious comorbidity and body mass index (BMI) of 37.0 to 37.9 in adult (HCC)   Other Visit Diagnoses     Routine general medical examination at a health care facility    -  Primary    Encounter for screening mammogram for breast cancer        Relevant Orders    Mammo screening bilateral w 3d & cad    Encounter for immunization        Relevant Orders    Pneumococcal Conjugate Vaccine 20-valent (Pcv20) (Completed)               Return in about 6 months (around 2024) for Recheck.        Chief Complaint     Chief Complaint   Patient presents with   • Physical Exam     Keeley/YUE       History of Present Illness     DISCUSSED HEALTH ISSUES  REVIEWED MEDICAL RECORD  NO CONCERNS AT THIS TIME            Well Adult Physical   Patient here for a comprehensive physical exam.      Diet and Physical Activity  Diet: well balanced diet  Weight concerns: Patient has class 2 obesity (BMI 35.0-39.9)  Exercise: infrequently      Depression Screen  PHQ-2/9 Depression Screening    Little interest or pleasure  in doing things: 0 - not at all  Feeling down, depressed, or hopeless: 0 - not at all  PHQ-2 Score: 0  PHQ-2 Interpretation: Negative depression screen          General Health  Hearing: Normal:  bilateral  Vision: no vision problems  Dental: regular dental visits    Reproductive Health          The following portions of the patient's history were reviewed and updated as appropriate: allergies, current medications, past family history, past medical history, past social history, past surgical history and problem list.    Review of Systems     Review of Systems   Constitutional:  Negative for chills, fatigue and fever.   HENT:  Negative for congestion, ear discharge, ear pain, mouth sores, postnasal drip, sore throat and trouble swallowing.    Eyes:  Negative for pain, discharge and visual disturbance.   Respiratory:  Negative for cough, shortness of breath and wheezing.    Cardiovascular:  Negative for chest pain, palpitations and leg swelling.   Gastrointestinal:  Negative for abdominal distention, abdominal pain, blood in stool, diarrhea and nausea.   Endocrine: Negative for polydipsia, polyphagia and polyuria.   Genitourinary:  Negative for dysuria, frequency, hematuria and urgency.   Musculoskeletal:  Negative for arthralgias, gait problem and joint swelling.   Skin:  Negative for pallor and rash.   Neurological:  Negative for dizziness, syncope, speech difficulty, weakness, light-headedness, numbness and headaches.   Hematological:  Negative for adenopathy.   Psychiatric/Behavioral:  Negative for behavioral problems, confusion and sleep disturbance. The patient is not nervous/anxious.        Past Medical History     Past Medical History:   Diagnosis Date   • COVID-19 virus infection 11/14/2020   • Diabetes (HCC)    • Diabetes mellitus (HCC) type 1   • Disease of thyroid gland    • Hyperlipidemia     last assessed 5/11/15       Past Surgical History     Past Surgical History:   Procedure Laterality Date   •  APPENDECTOMY     • CARPAL TUNNEL RELEASE Bilateral    • CYST REMOVAL      hand, tendon cyst   • GANGLION CYST EXCISION     • TONSILLECTOMY     • WISDOM TOOTH EXTRACTION         Social History     Social History     Socioeconomic History   • Marital status: Single     Spouse name: None   • Number of children: 0   • Years of education: None   • Highest education level: None   Occupational History   • Occupation: staff     Employer: [employer]   Tobacco Use   • Smoking status: Former     Current packs/day: 0.00     Average packs/day: 1 pack/day for 20.0 years (20.0 ttl pk-yrs)     Types: Cigarettes     Start date: 3/28/2013     Quit date: 3/29/2013     Years since quittin.2     Passive exposure: Never   • Smokeless tobacco: Never   Vaping Use   • Vaping status: Never Used   Substance and Sexual Activity   • Alcohol use: No   • Drug use: No   • Sexual activity: Not Currently     Partners: Male     Birth control/protection: Pill     Comment: oral contraception   Other Topics Concern   • None   Social History Narrative    Daily caffeine consumption;2-3 serving a day    Dental care occasionally     Social Determinants of Health     Financial Resource Strain: Not on file   Food Insecurity: Not on file   Transportation Needs: Not on file   Physical Activity: Not on file   Stress: Not on file   Social Connections: Not on file   Intimate Partner Violence: Not on file   Housing Stability: Not on file       Family History     Family History   Problem Relation Age of Onset   • No Known Problems Mother    • Hypertension Father    • No Known Problems Sister    • No Known Problems Brother    • Thyroid disease unspecified Maternal Aunt    • No Known Problems Maternal Uncle    • No Known Problems Paternal Aunt    • No Known Problems Paternal Uncle    • Liver cancer Maternal Grandmother    • Cancer Maternal Grandmother    • Frontotemporal dementia Maternal Grandfather    • No Known Problems Paternal Grandmother    •  Diabetes type II Paternal Grandfather    • Diabetes Paternal Grandfather    • Mental illness Neg Hx    • Substance Abuse Neg Hx        Current Medications       Current Outpatient Medications:   •  Accu-Chek FastClix Lancets MISC, USE TO CHECK BLOOD SUGAR 4  TIMES DAILY, Disp: 408 each, Rfl: 3  •  atorvastatin (LIPITOR) 20 mg tablet, TAKE 1 TABLET BY MOUTH DAILY, Disp: 90 tablet, Rfl: 3  •  Baqsimi Two Pack 3 MG/DOSE POWD, INSERT DEVICE TIP INTO 1 NOSTRIL PUSH DEVICE PLUNGER UNTIL GREEN  LINE DISAPPEARS AS NEEDED FOR   HYPOGLYCEMIA, Disp: 2 each, Rfl: 3  •  Cholecalciferol (VITAMIN D3 PO), Take 1,000 Int'l Units by mouth, Disp: , Rfl:   •  Continuous Blood Gluc  (Dexcom G7 ) SAIMA, Use 1 Device continuous, Disp: 1 each, Rfl: 0  •  EPINEPHrine (EPIPEN) 0.3 mg/0.3 mL SOAJ, INJECT INTRAMUSCULARLY 1 PEN AS  NEEDED FOR ALLERGIC RESPONSE AS  DIRECTED BY MD. SEEK MEDICAL  HELP AFTER USE., Disp: 6 each, Rfl: 0  •  glucose blood (Contour Next Test) test strip, USE TO TEST BLOOD SUGAR 5  TIMES DAILY, Disp: 500 strip, Rfl: 3  •  HumaLOG 100 UNIT/ML injection, INJECT SUBCUTANEOUSLY UP TO 80  UNITS DAILY VIA INSULIN PUMP, Disp: 80 mL, Rfl: 3  •  levothyroxine 125 mcg tablet, TAKE 1 TABLET BY MOUTH DAILY, Disp: 90 tablet, Rfl: 1  •  Multiple Vitamins-Minerals (CENTRUM SILVER 50+WOMEN PO), Take by mouth, Disp: , Rfl:   •  naproxen (EC NAPROSYN) 500 MG EC tablet, Take 1 tablet (500 mg total) by mouth 2 (two) times a day with meals AS DIRECTED 2 DAYS PRIOR TO HER PERIOD DISCONTINUE WITH START OF HER NEW PILL PACK, Disp: 60 tablet, Rfl: 1  •  Nortrel 7/7/7 0.5/0.75/1-35 MG-MCG per tablet, TAKE 1 TABLET BY MOUTH DAILY, Disp: 84 tablet, Rfl: 3     Allergies     Allergies   Allergen Reactions   • Penicillins    • Sulfites - Food Allergy        Objective     /70   Pulse 88   Temp (!) 97.4 °F (36.3 °C) (Temporal)   Resp 16   Ht 5' (1.524 m)   Wt 93.5 kg (206 lb 3.2 oz)   LMP 05/26/2024 (Exact Date)   SpO2 96%    BMI 40.27 kg/m²      Physical Exam  Vitals reviewed.   Constitutional:       General: She is not in acute distress.     Appearance: Normal appearance. She is well-developed. She is obese. She is not ill-appearing.   HENT:      Head: Normocephalic and atraumatic.      Right Ear: Tympanic membrane and external ear normal.      Left Ear: Tympanic membrane and external ear normal.      Nose: Nose normal.      Mouth/Throat:      Mouth: Mucous membranes are moist.   Eyes:      General:         Right eye: No discharge.         Left eye: No discharge.      Conjunctiva/sclera: Conjunctivae normal.      Pupils: Pupils are equal, round, and reactive to light.   Neck:      Thyroid: No thyromegaly.   Cardiovascular:      Rate and Rhythm: Normal rate and regular rhythm.      Heart sounds: Normal heart sounds. No murmur heard.  Pulmonary:      Effort: Pulmonary effort is normal.      Breath sounds: Normal breath sounds. No wheezing or rales.   Abdominal:      General: Bowel sounds are normal. There is no distension.      Palpations: Abdomen is soft. There is no mass.      Tenderness: There is no abdominal tenderness. There is no guarding or rebound.   Musculoskeletal:         General: No tenderness or deformity. Normal range of motion.      Cervical back: Normal range of motion and neck supple.   Lymphadenopathy:      Cervical: No cervical adenopathy.   Skin:     General: Skin is warm and dry.      Findings: No erythema or rash.   Neurological:      General: No focal deficit present.      Mental Status: She is alert and oriented to person, place, and time.      Cranial Nerves: No cranial nerve deficit.      Sensory: No sensory deficit.      Motor: No weakness or abnormal muscle tone.      Coordination: Coordination normal.      Gait: Gait normal.      Deep Tendon Reflexes: Reflexes are normal and symmetric. Reflexes normal.   Psychiatric:         Mood and Affect: Mood normal.         Behavior: Behavior normal.         Thought  Content: Thought content normal.         Judgment: Judgment normal.           No results found.    Health Maintenance     Health Maintenance   Topic Date Due   • Lung Cancer Screening  Never done   • COVID-19 Vaccine (4 - 2023-24 season) 09/01/2023   • Cervical Cancer Screening  03/05/2024   • Breast Cancer Screening: Mammogram  05/13/2024   • HEMOGLOBIN A1C  08/25/2024   • Influenza Vaccine (Season Ended) 09/01/2024   • DM Eye Exam  11/29/2024 (Originally 8/6/2022)   • Kidney Health Evaluation: Albumin/Creatinine Ratio  10/21/2024   • Kidney Health Evaluation: GFR  05/25/2025   • Diabetic Foot Exam  06/06/2025   • Depression Screening  06/20/2025   • Annual Physical  06/20/2025   • Colorectal Cancer Screening  11/07/2025   • RSV Vaccine Age 60+ Years (1 - 1-dose 60+ series) 04/03/2028   • DTaP,Tdap,and Td Vaccines (4 - Td or Tdap) 03/17/2034   • HIV Screening  Completed   • Hepatitis C Screening  Completed   • Zoster Vaccine  Completed   • Pneumococcal Vaccine: Pediatrics (0 to 5 Years) and At-Risk Patients (6 to 64 Years)  Completed   • RSV Vaccine age 0-20 Months  Aged Out   • HIB Vaccine  Aged Out   • IPV Vaccine  Aged Out   • Hepatitis A Vaccine  Aged Out   • Meningococcal ACWY Vaccine  Aged Out   • HPV Vaccine  Aged Out     Immunization History   Administered Date(s) Administered   • COVID-19 MODERNA VACC 0.25 ML IM BOOSTER 11/23/2021   • COVID-19 MODERNA VACC 0.5 ML IM 03/17/2021, 04/16/2021   • INFLUENZA 10/23/2018, 10/23/2018, 10/23/2022   • Influenza Injectable, MDCK, Preservative Free, Quadrivalent, 0.5 mL 11/10/2020   • Influenza Quadrivalent 6 mos and older IM 03/30/2022   • Influenza Quadrivalent Preservative Free 3 years and older IM 10/01/2016   • Influenza, seasonal, injectable 09/17/2014   • Influenza, seasonal, injectable, preservative free 09/15/2015   • Pneumococcal Conjugate 13-Valent 11/21/2018   • Pneumococcal Conjugate Vaccine 20-valent (Pcv20), Polysace 06/20/2024   • Pneumococcal  Polysaccharide PPV23 12/03/2001, 04/03/2013   • Tdap 05/23/2007, 11/13/2018, 03/17/2024   • Zoster Vaccine Recombinant 07/05/2022, 10/04/2022       Nash Cantu MD  [facility]

## 2024-06-20 NOTE — PATIENT INSTRUCTIONS
DISCUSSED HEALTH ISSUES  HEALTHY DIET AND EXERCISE  BW WILL BE REVIEWED  MAMMOGRAPHY   RECOMMEND CALCIUM 9177-5641 MG DAILY  VITAMIN D3  1000 IU DAILY  RV IN 1 YEAR FOR ANNUAL EXAM, SOONER IF NEEDED    TRIAL OF NAPROSYN FOR MIGRAINE HA      RV 6M    Recent Results (from the past 672 hour(s))   Hemoglobin A1C    Collection Time: 05/25/24  8:07 AM   Result Value Ref Range    Hemoglobin A1C 7.2 (H) 4.8 - 5.6 %    Estimated Average Glucose 160 mg/dL   TSH, 3rd generation    Collection Time: 05/25/24  8:07 AM   Result Value Ref Range    TSH 3.680 0.450 - 4.500 uIU/mL   T4, free    Collection Time: 05/25/24  8:07 AM   Result Value Ref Range    Free t4 1.56 0.82 - 1.77 ng/dL   Comprehensive metabolic panel    Collection Time: 05/25/24  8:07 AM   Result Value Ref Range    Glucose, Random 108 (H) 70 - 99 mg/dL    BUN 11 6 - 24 mg/dL    Creatinine 0.77 0.57 - 1.00 mg/dL    eGFR 90 >59 mL/min/1.73    SL AMB BUN/CREATININE RATIO 14 9 - 23    Sodium 137 134 - 144 mmol/L    Potassium 5.1 3.5 - 5.2 mmol/L    Chloride 101 96 - 106 mmol/L    CO2 19 (L) 20 - 29 mmol/L    CALCIUM 9.7 8.7 - 10.2 mg/dL    Protein, Total 7.0 6.0 - 8.5 g/dL    Albumin 4.4 3.8 - 4.9 g/dL    Globulin, Total 2.6 1.5 - 4.5 g/dL    Albumin/Globulin Ratio 1.7 1.2 - 2.2    TOTAL BILIRUBIN <0.2 0.0 - 1.2 mg/dL    Alk Phos Isoenzymes 116 44 - 121 IU/L    AST 18 0 - 40 IU/L    ALT 14 0 - 32 IU/L

## 2024-06-20 NOTE — LETTER
JULIA EVANS      Current Outpatient Medications:     Accu-Chek FastClix Lancets MISC, USE TO CHECK BLOOD SUGAR 4  TIMES DAILY, Disp: 408 each, Rfl: 3    atorvastatin (LIPITOR) 20 mg tablet, TAKE 1 TABLET BY MOUTH DAILY, Disp: 90 tablet, Rfl: 3    Baqsimi Two Pack 3 MG/DOSE POWD, INSERT DEVICE TIP INTO 1 NOSTRIL PUSH DEVICE PLUNGER UNTIL GREEN  LINE DISAPPEARS AS NEEDED FOR   HYPOGLYCEMIA, Disp: 2 each, Rfl: 3    Cholecalciferol (VITAMIN D3 PO), Take 1,000 Int'l Units by mouth, Disp: , Rfl:     Continuous Blood Gluc  (Dexcom G7 ) SAIMA, Use 1 Device continuous, Disp: 1 each, Rfl: 0    EPINEPHrine (EPIPEN) 0.3 mg/0.3 mL SOAJ, INJECT INTRAMUSCULARLY 1 PEN AS  NEEDED FOR ALLERGIC RESPONSE AS  DIRECTED BY MD. SEEK MEDICAL  HELP AFTER USE., Disp: 6 each, Rfl: 0    glucose blood (Contour Next Test) test strip, USE TO TEST BLOOD SUGAR 5  TIMES DAILY, Disp: 500 strip, Rfl: 3    HumaLOG 100 UNIT/ML injection, INJECT SUBCUTANEOUSLY UP TO 80  UNITS DAILY VIA INSULIN PUMP, Disp: 80 mL, Rfl: 3    levothyroxine 125 mcg tablet, TAKE 1 TABLET BY MOUTH DAILY, Disp: 90 tablet, Rfl: 1    Multiple Vitamins-Minerals (CENTRUM SILVER 50+WOMEN PO), Take by mouth, Disp: , Rfl:     Nortrel 7/7/7 0.5/0.75/1-35 MG-MCG per tablet, TAKE 1 TABLET BY MOUTH DAILY, Disp: 84 tablet, Rfl: 3      Recent Results (from the past 2688 hour(s))   Hemoglobin A1C    Collection Time: 05/25/24  8:07 AM   Result Value Ref Range    Hemoglobin A1C 7.2 (H) 4.8 - 5.6 %    Estimated Average Glucose 160 mg/dL   TSH, 3rd generation    Collection Time: 05/25/24  8:07 AM   Result Value Ref Range    TSH 3.680 0.450 - 4.500 uIU/mL   T4, free    Collection Time: 05/25/24  8:07 AM   Result Value Ref Range    Free t4 1.56 0.82 - 1.77 ng/dL   Comprehensive metabolic panel    Collection Time: 05/25/24  8:07 AM   Result Value Ref Range    Glucose, Random 108 (H) 70 - 99 mg/dL    BUN 11 6 - 24 mg/dL    Creatinine 0.77 0.57 - 1.00 mg/dL    eGFR 90 >59 mL/min/1.73     SL AMB BUN/CREATININE RATIO 14 9 - 23    Sodium 137 134 - 144 mmol/L    Potassium 5.1 3.5 - 5.2 mmol/L    Chloride 101 96 - 106 mmol/L    CO2 19 (L) 20 - 29 mmol/L    CALCIUM 9.7 8.7 - 10.2 mg/dL    Protein, Total 7.0 6.0 - 8.5 g/dL    Albumin 4.4 3.8 - 4.9 g/dL    Globulin, Total 2.6 1.5 - 4.5 g/dL    Albumin/Globulin Ratio 1.7 1.2 - 2.2    TOTAL BILIRUBIN <0.2 0.0 - 1.2 mg/dL    Alk Phos Isoenzymes 116 44 - 121 IU/L    AST 18 0 - 40 IU/L    ALT 14 0 - 32 IU/L

## 2024-06-21 ENCOUNTER — NURSE TRIAGE (OUTPATIENT)
Dept: OTHER | Facility: OTHER | Age: 54
End: 2024-06-21

## 2024-06-21 NOTE — TELEPHONE ENCOUNTER
"Regarding: Prescription problem  ----- Message from Sugar NIELSON sent at 6/21/2024  4:57 PM EDT -----  \"I went to my PCP yesterday and he said he was going to send two prescriptions to the pharmacy and he only sent one. He did not send the one to help me breathe better.\"    "

## 2024-06-21 NOTE — TELEPHONE ENCOUNTER
"Reason for Disposition  • [1] Caller requesting NON-URGENT health information AND [2] PCP's office is the best resource    Answer Assessment - Initial Assessment Questions  1. REASON FOR CALL or QUESTION: \"What is your reason for calling today?\" or \"How can I best help you?\" or \"What question do you have that I can help answer?\"      Patient states she had an appointment yesterday with her PCP and she was told she would be getting an inhaler or something to help her, before she started her exercise regimen. Patient unsure of what it was called and also stated she will not be starting this regimen this weekend anyway due to the high temperatures outside.    Protocols used: Information Only Call - No Triage-ADULT-    "

## 2024-06-21 NOTE — TELEPHONE ENCOUNTER
Patient denies any symptoms now and reiterated that due to heat outside, will not be starting exercise regimen this weekend. Patient just wanted office/PCP to follow up with her on Monday, regarding possible inhaler. Care advice given. Verbalized understanding.

## 2024-06-25 DIAGNOSIS — J45.990 EXERCISE-INDUCED ASTHMA: Primary | ICD-10-CM

## 2024-06-25 RX ORDER — ALBUTEROL SULFATE 90 UG/1
2 AEROSOL, METERED RESPIRATORY (INHALATION) EVERY 6 HOURS PRN
Qty: 6.7 G | Refills: 5 | Status: SHIPPED | OUTPATIENT
Start: 2024-06-25

## 2024-07-05 DIAGNOSIS — E10.65 TYPE 1 DIABETES MELLITUS WITH HYPERGLYCEMIA (HCC): ICD-10-CM

## 2024-07-05 DIAGNOSIS — E10.65 TYPE 1 DIABETES MELLITUS WITH HYPERGLYCEMIA, WITH LONG-TERM CURRENT USE OF INSULIN (HCC): ICD-10-CM

## 2024-07-06 RX ORDER — LANCETS
EACH MISCELLANEOUS
Qty: 400 EACH | Refills: 1 | Status: SHIPPED | OUTPATIENT
Start: 2024-07-06

## 2024-07-11 ENCOUNTER — TELEPHONE (OUTPATIENT)
Age: 54
End: 2024-07-11

## 2024-07-11 ENCOUNTER — TELEPHONE (OUTPATIENT)
Dept: ENDOCRINOLOGY | Facility: CLINIC | Age: 54
End: 2024-07-11

## 2024-07-11 NOTE — TELEPHONE ENCOUNTER
Pt is asking if you can do a clearance letter for her to get a tattoo. I guess she needed one last year.  Call pt when done.  She was hoping to pick it up Friday.

## 2024-07-18 ENCOUNTER — TELEPHONE (OUTPATIENT)
Dept: ENDOCRINOLOGY | Facility: CLINIC | Age: 54
End: 2024-07-18

## 2024-07-18 NOTE — TELEPHONE ENCOUNTER
Encounter for forms        Please refer to the following information:       Type of Form: [employer]    Date of Visit (if applicable): 6/12/2024    Doctor: Monica Starkey    Fax number: [phone]    Patient phone number: [phone]      Original On Monica Starkey desk to be completed.

## 2024-08-16 DIAGNOSIS — T78.40XD ALLERGY, SUBSEQUENT ENCOUNTER: ICD-10-CM

## 2024-08-16 DIAGNOSIS — J45.990 EXERCISE-INDUCED ASTHMA: ICD-10-CM

## 2024-08-16 RX ORDER — EPINEPHRINE 0.3 MG/.3ML
INJECTION SUBCUTANEOUS
Qty: 6 EACH | Refills: 0 | Status: SHIPPED | OUTPATIENT
Start: 2024-08-16

## 2024-08-18 RX ORDER — ALBUTEROL SULFATE 90 UG/1
2 AEROSOL, METERED RESPIRATORY (INHALATION) EVERY 6 HOURS PRN
Qty: 6.7 G | Refills: 5 | Status: SHIPPED | OUTPATIENT
Start: 2024-08-18

## 2024-08-22 DIAGNOSIS — G43.009 MIGRAINE WITHOUT AURA AND WITHOUT STATUS MIGRAINOSUS, NOT INTRACTABLE: Primary | ICD-10-CM

## 2024-08-22 RX ORDER — SUMATRIPTAN 100 MG/1
TABLET, FILM COATED ORAL
Qty: 10 TABLET | Refills: 5 | Status: SHIPPED | OUTPATIENT
Start: 2024-08-22

## 2024-08-23 DIAGNOSIS — G43.009 MIGRAINE WITHOUT AURA AND WITHOUT STATUS MIGRAINOSUS, NOT INTRACTABLE: ICD-10-CM

## 2024-08-23 RX ORDER — NAPROXEN 500 MG/1
500 TABLET ORAL 2 TIMES DAILY WITH MEALS
Qty: 180 TABLET | Refills: 1 | Status: SHIPPED | OUTPATIENT
Start: 2024-08-23

## 2024-08-23 NOTE — TELEPHONE ENCOUNTER
Patient called the RX Refill Line. Message is being forwarded to the office.     Patient is requesting an alternative for the naprosyn. Patient stated [employer] called and advised that the insurance will not cover this medication.     Please contact patient at [phone]

## 2024-08-29 LAB
LEFT EYE DIABETIC RETINOPATHY: NORMAL
RIGHT EYE DIABETIC RETINOPATHY: NORMAL

## 2024-09-11 DIAGNOSIS — E03.9 ACQUIRED HYPOTHYROIDISM: ICD-10-CM

## 2024-09-11 DIAGNOSIS — T78.40XD ALLERGY, SUBSEQUENT ENCOUNTER: ICD-10-CM

## 2024-09-11 RX ORDER — LEVOTHYROXINE SODIUM 125 UG/1
TABLET ORAL
Qty: 90 TABLET | Refills: 1 | Status: SHIPPED | OUTPATIENT
Start: 2024-09-11

## 2024-09-11 RX ORDER — EPINEPHRINE 0.3 MG/.3ML
INJECTION SUBCUTANEOUS
Qty: 6 EACH | Refills: 0 | Status: SHIPPED | OUTPATIENT
Start: 2024-09-11

## 2024-09-15 LAB
ALBUMIN SERPL-MCNC: 4.4 G/DL (ref 3.8–4.9)
ALBUMIN/CREAT UR: <28 MG/G CREAT (ref 0–29)
ALP SERPL-CCNC: 103 IU/L (ref 44–121)
ALT SERPL-CCNC: 14 IU/L (ref 0–32)
AST SERPL-CCNC: 17 IU/L (ref 0–40)
BILIRUB SERPL-MCNC: <0.2 MG/DL (ref 0–1.2)
BUN SERPL-MCNC: 13 MG/DL (ref 6–24)
BUN/CREAT SERPL: 18 (ref 9–23)
CALCIUM SERPL-MCNC: 9.7 MG/DL (ref 8.7–10.2)
CHLORIDE SERPL-SCNC: 101 MMOL/L (ref 96–106)
CO2 SERPL-SCNC: 21 MMOL/L (ref 20–29)
CREAT SERPL-MCNC: 0.73 MG/DL (ref 0.57–1)
CREAT UR-MCNC: 10.8 MG/DL
EGFR: 96 ML/MIN/1.73
EST. AVERAGE GLUCOSE BLD GHB EST-MCNC: 154 MG/DL
GLOBULIN SER-MCNC: 2.6 G/DL (ref 1.5–4.5)
GLUCOSE SERPL-MCNC: 117 MG/DL (ref 70–99)
HBA1C MFR BLD: 7 % (ref 4.8–5.6)
MICROALBUMIN UR-MCNC: <3 UG/ML
POTASSIUM SERPL-SCNC: 5.4 MMOL/L (ref 3.5–5.2)
PROT SERPL-MCNC: 7 G/DL (ref 6–8.5)
SODIUM SERPL-SCNC: 137 MMOL/L (ref 134–144)
T4 FREE SERPL-MCNC: 1.48 NG/DL (ref 0.82–1.77)
TSH SERPL DL<=0.005 MIU/L-ACNC: 3.9 UIU/ML (ref 0.45–4.5)

## 2024-09-17 DIAGNOSIS — E87.5 SERUM POTASSIUM ELEVATED: Primary | ICD-10-CM

## 2024-09-30 ENCOUNTER — OFFICE VISIT (OUTPATIENT)
Dept: ENDOCRINOLOGY | Facility: CLINIC | Age: 54
End: 2024-09-30
Payer: COMMERCIAL

## 2024-09-30 VITALS
SYSTOLIC BLOOD PRESSURE: 140 MMHG | HEIGHT: 60 IN | DIASTOLIC BLOOD PRESSURE: 80 MMHG | WEIGHT: 206 LBS | BODY MASS INDEX: 40.44 KG/M2

## 2024-09-30 DIAGNOSIS — Z96.41 INSULIN PUMP IN PLACE: ICD-10-CM

## 2024-09-30 DIAGNOSIS — E66.01 CLASS 3 SEVERE OBESITY DUE TO EXCESS CALORIES WITH SERIOUS COMORBIDITY AND BODY MASS INDEX (BMI) OF 40.0 TO 44.9 IN ADULT (HCC): ICD-10-CM

## 2024-09-30 DIAGNOSIS — E10.65 TYPE 1 DIABETES MELLITUS WITH HYPERGLYCEMIA (HCC): ICD-10-CM

## 2024-09-30 DIAGNOSIS — E66.813 CLASS 3 SEVERE OBESITY DUE TO EXCESS CALORIES WITH SERIOUS COMORBIDITY AND BODY MASS INDEX (BMI) OF 40.0 TO 44.9 IN ADULT (HCC): ICD-10-CM

## 2024-09-30 DIAGNOSIS — E78.2 MIXED HYPERLIPIDEMIA: Primary | ICD-10-CM

## 2024-09-30 DIAGNOSIS — E03.9 HYPOTHYROIDISM, UNSPECIFIED TYPE: ICD-10-CM

## 2024-09-30 DIAGNOSIS — E55.9 VITAMIN D DEFICIENCY: ICD-10-CM

## 2024-09-30 PROBLEM — E66.09 OBESITY DUE TO EXCESS CALORIES WITH SERIOUS COMORBIDITY: Status: ACTIVE | Noted: 2021-03-05

## 2024-09-30 PROCEDURE — 95251 CONT GLUC MNTR ANALYSIS I&R: CPT | Performed by: NURSE PRACTITIONER

## 2024-09-30 PROCEDURE — 99204 OFFICE O/P NEW MOD 45 MIN: CPT | Performed by: NURSE PRACTITIONER

## 2024-09-30 RX ORDER — INSULIN LISPRO 100 [IU]/ML
INJECTION, SOLUTION INTRAVENOUS; SUBCUTANEOUS
Qty: 90 ML | Refills: 3 | Status: SHIPPED | OUTPATIENT
Start: 2024-09-30

## 2024-09-30 RX ORDER — LANCETS
EACH MISCELLANEOUS
Qty: 400 EACH | Refills: 1 | Status: SHIPPED | OUTPATIENT
Start: 2024-09-30

## 2024-09-30 NOTE — ASSESSMENT & PLAN NOTE
Continues on statin. LDL near goal in February 2024.   Recommend repeat lipid panel.       Orders:    Lipid panel; Future    Lipid panel

## 2024-09-30 NOTE — ASSESSMENT & PLAN NOTE
Recommend regular moderate intensity physical activity 30-60 minutes per week of cardio, 2-3 days per week weight training.    Continue clean eating with carbohydrate conscious diet.    Could consider: Zepbound, Wegovy, or Saxenda.    Discussed GLP-1 RA options.  Denies personal history of pancreatitis, family/personal history of thyroid c-cell tumors including medullary thyroid cancer, or family/personal history of multiple endocrine neoplasias. Reviewed mechanism of action. Patient is aware of possible side effects which include nausea, vomiting, diarrhea, constipation, bloating, upset stomach. The patient is aware that blood sugar logs/CGM download must be submitted for review before dose adjustment and reporting any side effects of the medication which may include appetite suppression, constipation, diarrhea, nausea, abdominal pain, fatigue/malaise. Counseled the patient on the importance of maintaining a healthy diet in addition to regular physical activity while on this medication.

## 2024-09-30 NOTE — PATIENT INSTRUCTIONS
"Options for weight loss: Zepbound, Wegovy, Saxenda    Low blood sugar in people with diabetes   The Basics   Written by the doctors and editors at [facility]   What is low blood sugar? -- This is when the level of sugar in a person's blood gets too low. It is also called \"hypoglycemia.\"  Low blood sugar can cause symptoms ranging from sweating and feeling hungry to passing out.  Low blood sugar can happen in people with diabetes who take certain medicines, including insulin, other medicines given as shots, and some types of pills.  When can people with diabetes get low blood sugar? -- People with diabetes can get low blood sugar when they:   Take too much medicine, including insulin, other medicines given as shots, or certain diabetes pills   Do not eat enough food   Exercise too much without eating a snack or reducing their insulin dose   Wait too long between meals   Drink too much alcohol or drink alcohol on an empty stomach  What are the symptoms of low blood sugar? -- The symptoms can be different from person to person, and can change over time. During the early stages of low blood sugar, a person can:   Sweat or tremble   Feel hungry   Feel worried  People who have early symptoms should check their blood sugar level to see if it is low and needs to be treated. If low blood sugar levels are not treated, severe symptoms can occur. These can include:   Trouble walking or feeling weak   Trouble seeing clearly   Being confused or acting in a strange way   Passing out or having a seizure  Some people do not get symptoms during the early stages of low blood sugar. Doctors sometimes call this \"hypoglycemia unawareness.\" People with hypoglycemia unawareness are more likely to have severe symptoms, because they might not know that they have low blood sugar until they have severe symptoms. Hypoglycemia unawareness is more likely in people who:   Have had type 1 diabetes for more than 5 to 10 years   Have frequent episodes " of low blood sugar   Use insulin to keep their blood sugar level tightly managed   Are tired   Drink a lot of alcohol   Take certain medicines for high blood pressure or diabetes  How is low blood sugar treated? -- It can be treated with:   Quick sources of sugar - People can eat or drink quick sources of sugar (table 1). Foods that have fat, such as chocolate or cheese, do not treat low blood sugar as quickly. You and a family member should carry a quick source of sugar at all times.   A dose of glucagon - Glucagon is a hormone that can quickly raise blood sugar levels and stop severe symptoms. It comes as a shot (figure 1) or a nose spray. If your doctor recommends that you carry glucagon with you, they will tell you when and how to use it. If possible, it's also a good idea to have a family member, friend, or roommate learn how to give you glucagon. That way, they can give it to you if you can't do it yourself.  After treating low blood sugar, it is very important to recheck your blood sugar level to make sure that it rises and stays in the normal range. Once your blood sugar is normal, eat a small snack that contains protein, fat, and carbohydrate. This can help keep your blood sugar stable.  What should I do after treatment? -- After treatment for low blood sugar, most people can get back to their usual routine. But your doctor or nurse might recommend that you check your blood sugar level more often during the next 2 to 3 days.  If your low blood sugar was treated with glucagon, call your doctor or nurse. They might change the dose of your diabetes medicine.  How can I prevent low blood sugar? -- The best way is to:   Check your blood sugar levels often - Your doctor or nurse will tell you how and when to check your blood sugar levels at home. They will also tell you what your blood sugar levels should be, and when to treat low blood sugar.   Learn the symptoms of low blood sugar, and be ready to treat it in  "the early stages. Treating low blood sugar early can prevent severe symptoms.  When should I go to a hospital or call for an ambulance? -- A family member or friend should take you to a hospital or call for an ambulance (in the [place] and [place], call 9-1-1) if you:   Are still confused 15 minutes after being treated with a dose of glucagon   Have passed out, and there is no glucagon nearby   Still have low blood sugar after treatment  If you have low blood sugar, do not try to drive yourself to the hospital. Driving with low blood sugar can be dangerous.  All topics are updated as new evidence becomes available and our peer review process is complete.  This topic retrieved from [employer] on: Apr 11, 2024.  Topic 44883 Version 22.0  Release: 32.3.2 - C32.100  © 2024 UpToDate, Inc. and/or its affiliates. All rights reserved.  table 1: Quick sources of sugar to treat low blood sugar  3 or 4 glucose tablets   ½ cup of juice or regular soda (not sugar-free)   2 tablespoons of raisins   4 or 5 saltine crackers   1 tablespoon of sugar   1 tablespoon of honey or corn syrup   6 to 8 hard candies   These sources of sugar act quickly to treat low blood sugar levels. People with diabetes who use insulin or certain other diabetes medicines should carry at least 1 of these items at all times.  Graphic 59066 Version 4.0  figure 1: Glucagon autoinjector     Some people carry glucagon in the form of an autoinjector \"pen.\" This makes it easy to give a dose into the upper arm, thigh, or belly.  Graphic 380192 Version 2.0  Consumer Information Use and Disclaimer   Disclaimer: This generalized information is a limited summary of diagnosis, treatment, and/or medication information. It is not meant to be comprehensive and should be used as a tool to help the user understand and/or assess potential diagnostic and treatment options. It does NOT include all information about conditions, treatments, medications, side effects, or risks that may " apply to a specific patient. It is not intended to be medical advice or a substitute for the medical advice, diagnosis, or treatment of a health care provider based on the health care provider's examination and assessment of a patient's specific and unique circumstances. Patients must speak with a health care provider for complete information about their health, medical questions, and treatment options, including any risks or benefits regarding use of medications. This information does not endorse any treatments or medications as safe, effective, or approved for treating a specific patient. UpToDate, Inc. and its affiliates disclaim any warranty or liability relating to this information or the use thereof.The use of this information is governed by the Terms of Use, available at https://www.[employer].com/en/know/clinical-effectiveness-terms. 2024© UpToDate, Inc. and its affiliates and/or licensors. All rights reserved.  Copyright   © 2024 UpToDate, Inc. and/or its affiliates. All rights reserved.    Tirzepatide (tir ZEP a tide)   Brand Names: [place] Mounjaro; Zepbound   Brand Names: [place] Mounjaro   Warning   This drug has been shown to cause thyroid cancer in some animals. It is not known if this happens in humans. If thyroid cancer happens, it may be deadly if not found and treated early. Call your doctor right away if you have a neck mass, trouble breathing, trouble swallowing, or have hoarseness that will not go away.  Do not use this drug if you have a health problem called Multiple Endocrine Neoplasia syndrome type 2 (MEN 2), or if you or a family member have had thyroid cancer.  Have your blood work checked and thyroid ultrasounds as you have been told by your doctor.  What is this drug used for?   It is used to lower blood sugar in people with type 2 diabetes.  It is used to help with weight loss in certain people.  What do I need to tell my doctor BEFORE I take this drug?   All products:   If you are  allergic to this drug; any part of this drug; or any other drugs, foods, or substances. Tell your doctor about the allergy and what signs you had.  If you have ever had pancreatitis.  If you have stomach or bowel problems.  If you are using another drug that has the same drug in it.  If you are using another drug like this one. If you are not sure, ask your doctor or pharmacist.  If you are using this drug for diabetes:   If you have type 1 diabetes. Do not use this drug to treat type 1 diabetes.  Zepbound:   If you have or have ever had depression or thoughts of suicide.  This is not a list of all drugs or health problems that interact with this drug.  Tell your doctor and pharmacist about all of your drugs (prescription or OTC, natural products, vitamins) and health problems. You must check to make sure that it is safe for you to take this drug with all of your drugs and health problems. Do not start, stop, or change the dose of any drug without checking with your doctor.  What are some things I need to know or do while I take this drug?   All products:   Tell all of your health care providers that you take this drug. This includes your doctors, nurses, pharmacists, and dentists.  Follow the diet and workout plan that your doctor told you about.  Talk with your doctor before you drink alcohol.  Birth control pills may not work as well to prevent pregnancy. If you take birth control pills, you may need to switch to another type of hormone-based birth control like a vaginal ring if your doctor tells you to. If another type of hormone-based birth control is not an option, use some other kind of birth control also, like a condom. Do this for 4 weeks after starting this drug and for 4 weeks each time the dose is raised.  This drug may prevent other drugs taken by mouth from getting into the body. If you take other drugs by mouth, you may need to take them at some other time than this drug. Talk with your doctor.  Do  not share with another person even if the needle has been changed. Sharing your tray or pen may pass infections from one person to another. This includes infections you may not know you have.  If you cannot drink liquids by mouth or if you have upset stomach, throwing up, or diarrhea that does not go away, you need to avoid getting dehydrated. Contact your doctor to find out what to do. Dehydration may lead to low blood pressure or to new or worsening kidney problems.  A severe and sometimes deadly pancreas problem (pancreatitis) has happened with other drugs like this one.  If you are using this drug for diabetes:   It may be harder to control blood sugar during times of stress such as fever, infection, injury, or surgery. A change in physical activity, exercise, or diet may also affect blood sugar.  Check your blood sugar as you have been told by your doctor.  Do not drive if your blood sugar has been low. There is a greater chance of you having a crash.  Wear disease medical alert ID (identification).  Tell your doctor if you are pregnant, plan on getting pregnant, or are breast-feeding. You will need to talk about the benefits and risks to you and the baby.  Zepbound:   If you have high blood sugar (diabetes), you will need to watch your blood sugar closely.  Weight loss during pregnancy may cause harm to the unborn baby. If you get pregnant while taking this drug or if you want to get pregnant, call your doctor right away.  Tell your doctor if you are breast-feeding. You will need to talk about any risks to your baby.  What are some side effects that I need to call my doctor about right away?   WARNING/CAUTION: Even though it may be rare, some people may have very bad and sometimes deadly side effects when taking a drug. Tell your doctor or get medical help right away if you have any of the following signs or symptoms that may be related to a very bad side effect:  All products:   Signs of an allergic reaction,  like rash; hives; itching; red, swollen, blistered, or peeling skin with or without fever; wheezing; tightness in the chest or throat; trouble breathing, swallowing, or talking; unusual hoarseness; or swelling of the mouth, face, lips, tongue, or throat.  Signs of kidney problems like unable to pass urine, change in how much urine is passed, blood in the urine, or a big weight gain.  Signs of gallbladder problems like pain in the upper right belly area, right shoulder area, or between the shoulder blades; yellow skin or eyes; fever with chills; bloating; or very upset stomach or throwing up.  Signs of a pancreas problem (pancreatitis) like very bad stomach pain, very bad back pain, or very bad upset stomach or throwing up.  Dizziness or passing out.  A fast heartbeat.  Change in eyesight.  Low blood sugar can happen. The chance may be raised when this drug is used with other drugs for diabetes. Signs may be dizziness, headache, feeling sleepy or weak, shaking, fast heartbeat, confusion, hunger, or sweating. Call your doctor right away if you have any of these signs. Follow what you have been told to do for low blood sugar. This may include taking glucose tablets, liquid glucose, or some fruit juices.  Zepbound:   New or worse behavior or mood changes like depression or thoughts of suicide.  What are some other side effects of this drug?   All drugs may cause side effects. However, many people have no side effects or only have minor side effects. Call your doctor or get medical help if any of these side effects or any other side effects bother you or do not go away:  All products:   Constipation, diarrhea, stomach pain, upset stomach, throwing up, or decreased appetite.  Heartburn.  Pain, itching, or other irritation where the injection was given.  Zepbound:   Feeling tired or weak.  Hair loss.  These are not all of the side effects that may occur. If you have questions about side effects, call your doctor. Call your  doctor for medical advice about side effects.  You may report side effects to your national health agency.  You may report side effects to the FDA at 1-581.416.3319. You may also report side effects at https://www.fda.gov/medwatch.  How is this drug best taken?   Use this drug as ordered by your doctor. Read all information given to you. Follow all instructions closely.  All products:   It is given as a shot into the fatty part of the skin on the top of the thigh, belly area, or upper arm.  If you will be giving yourself the shot, your doctor or nurse will teach you how to give the shot.  Keep taking this drug as you have been told by your doctor or other health care provider, even if you feel well.  Take the same day each week.  Move site where you give the shot each time.  Take with or without food.  Wash your hands before and after use.  Do not use if the solution is leaking or has particles.  This drug is colorless to a faint yellow. Do not use if the solution changes color.  Do not move this drug from the pen to a syringe.  Each pen or vial is for 1 use only. Throw away any part of the used pen after the dose is given.  Throw away needles in a needle/sharp disposal box. Do not reuse needles or other items. When the box is full, follow all local rules for getting rid of it. Talk with a doctor or pharmacist if you have any questions.  Vials:   It is important to have the right syringe to measure your dose. If you do not have the right syringe or you are not sure, talk with your pharmacist.  If you are using this drug for diabetes:   If you are also using insulin, you may inject this drug and the insulin in the same area of the body but not right next to each other.  Do not mix this drug in the same syringe with insulin.  What do I do if I miss a dose?   If it is within 4 days after the missed dose, take the missed dose and go back to your normal day.  If it has been more than 4 days since the missed dose, skip  the missed dose and go back to your normal day.  Do not take 2 doses at the same time or extra doses.  How do I store and/or throw out this drug?   Store in a refrigerator. Do not freeze.  Do not use if it has been frozen.  If needed, each pen or vial may be stored at room temperature for up to 21 days. If you store at room temperature, throw away any part not used after 21 days.  Protect from heat.  Store in the original container to protect from light.  Keep all drugs in a safe place. Keep all drugs out of the reach of children and pets.  Throw away unused or  drugs. Do not flush down a toilet or pour down a drain unless you are told to do so. Check with your pharmacist if you have questions about the best way to throw out drugs. There may be drug take-back programs in your area.  General drug facts   If your symptoms or health problems do not get better or if they become worse, call your doctor.  Do not share your drugs with others and do not take anyone else's drugs.  Some drugs may have another patient information leaflet. If you have any questions about this drug, please talk with your doctor, nurse, pharmacist, or other health care provider.  This drug comes with an extra patient fact sheet called a Medication Guide. Read it with care. Read it again each time this drug is refilled. If you have any questions about this drug, please talk with the doctor, pharmacist, or other health care provider.  If you think there has been an overdose, call your poison control center or get medical care right away. Be ready to tell or show what was taken, how much, and when it happened.  Consumer Information Use and Disclaimer   This generalized information is a limited summary of diagnosis, treatment, and/or medication information. It is not meant to be comprehensive and should be used as a tool to help the user understand and/or assess potential diagnostic and treatment options. It does NOT include all information  about conditions, treatments, medications, side effects, or risks that may apply to a specific patient. It is not intended to be medical advice or a substitute for the medical advice, diagnosis, or treatment of a health care provider based on the health care provider's examination and assessment of a patient's specific and unique circumstances. Patients must speak with a health care provider for complete information about their health, medical questions, and treatment options, including any risks or benefits regarding use of medications. This information does not endorse any treatments or medications as safe, effective, or approved for treating a specific patient. UpToDate, Inc. and its affiliates disclaim any warranty or liability relating to this information or the use thereof. The use of this information is governed by the Terms of Use, available at https://www.[employer].com/en/know/clinical-effectiveness-terms.  Last Reviewed Date   2024-04-17  Copyright   © 2024 UpToDate, Inc. and its affiliates and/or licensors. All rights reserved.

## 2024-09-30 NOTE — ASSESSMENT & PLAN NOTE
Continues on levothyroxine 125 mcg daily Monday-Saturday and 1.5 tablet on Sunday.   Biochemically TSH is increasing.   Clinically tired.  Repeat thyroid function tests in 6-8 weeks.     Orders:    TSH, 3rd generation; Future    T4, free; Future    TSH, 3rd generation    T4, free

## 2024-09-30 NOTE — PROGRESS NOTES
Ambulatory Visit  Name: Layla Chaves      : 1970      MRN: [id number]  Encounter Provider: FAIZA Andrade  Encounter Date: 2024   Encounter department: [facility]    Assessment & Plan  Mixed hyperlipidemia  Continues on statin. LDL near goal in 2024.   Recommend repeat lipid panel.       Orders:    Lipid panel; Future    Lipid panel    Insulin pump in place  Not currently using control IQ. Tried but did not like the technology.          Vitamin D deficiency  Continues on vitamin D 1000 IU daily.           Hypothyroidism, unspecified type  Continues on levothyroxine 125 mcg daily Monday-Saturday and 1.5 tablet on .   Biochemically TSH is increasing.   Clinically tired.  Repeat thyroid function tests in 6-8 weeks.     Orders:    TSH, 3rd generation; Future    T4, free; Future    TSH, 3rd generation    T4, free    Type 1 diabetes mellitus with hyperglycemia (HCC)    Lab Results   Component Value Date    HGBA1C 7.0 (H) 2024     HGA1C close to goal. Continue on current regimen. Will adjust, as needed if starts GLP-1 for weight loss.    Discussed risks/complications associated with uncontrolled diabetes including organ involvement, heart attack, stroke, death.     Advised lifestyle modifications including attention to diet including the amount and types of carbohydrates consumed and regular activity.     Call for blood sugars less than 70 mg/dl or patterns over 250 mg/dl.     Monitor blood glucose levels at least 2 times a day, if on insulin ideally before all insulin administration times.     Discussed symptoms and treatment of hypoglycemia.  Reviewed risks associated with hypoglycemia. Always carry rapid acting carbohydrates and a glucometer (a way to check your blood sugar).    Recommendation for medical identification either bracelet, necklace.    Recommendation for glucagon if on insulin.     Routine follow up for diabetic eye and  foot exams.     Ordered blood work to complete prior to next visit.    Send glucose logs/CGM download in 1-2 weeks for review    Follow up in 3 months.     Orders:    Hemoglobin A1C; Future    Comprehensive metabolic panel; Future    Hemoglobin A1C    Comprehensive metabolic panel    Accu-Chek FastClix Lancets MISC; CHECK BLOOD SUGAR 4 TIMES DAILY    HumaLOG 100 UNIT/ML injection; INJECT SUBCUTANEOUSLY UP   UNITS DAILY VIA INSULIN PUMP    Class 3 severe obesity due to excess calories with serious comorbidity and body mass index (BMI) of 40.0 to 44.9 in adult (HCC)  Recommend regular moderate intensity physical activity 30-60 minutes per week of cardio, 2-3 days per week weight training.    Continue clean eating with carbohydrate conscious diet.    Could consider: Zepbound, Wegovy, or Saxenda.    Discussed GLP-1 RA options.  Denies personal history of pancreatitis, family/personal history of thyroid c-cell tumors including medullary thyroid cancer, or family/personal history of multiple endocrine neoplasias. Reviewed mechanism of action. Patient is aware of possible side effects which include nausea, vomiting, diarrhea, constipation, bloating, upset stomach. The patient is aware that blood sugar logs/CGM download must be submitted for review before dose adjustment and reporting any side effects of the medication which may include appetite suppression, constipation, diarrhea, nausea, abdominal pain, fatigue/malaise. Counseled the patient on the importance of maintaining a healthy diet in addition to regular physical activity while on this medication.            History of Present Illness     Layla Chaves is a 54 y.o. female with a history of type 1 diabetes with long term use of insulin for approximately 10 years which has been uncomplicated.    Regular diabetic eye and foot exams. Denies DR-- no vision.  Denies neuropathy-- denies numbness or tingling.     Denies recent illness or hospitalizations.     Denies  recent severe hypoglycemic or severe hyperglycemic episodes requiring medical attention.     Home glucose monitoring: are performed regularly with CGM.      CGM Interpretation  Layla Chaves   Device used Dexcom G7  Home use   Indication: Type 1 Diabetes   More than 72 hours of data was reviewed. Report to be scanned to chart.   Date Range: September 15-28, 2024  Analysis of data:   Average Glucose: 162 mg/dL  Time in Target Range: 76%   Time Above Range: 20%   Time Below Range: 4%  Interpretation of data:   Post-prandial hyperglycemia with occasional hypoglycemia.      Patient is on a Tandem pump prescribed by endocrinology. She has been on a pump for many years.   She denies any malfunctioning of the pump.  Current Problems with Pump: Did not prefer integration.      Current Insulin pump settings:  Basal rate: (MN) 1.3 (3A) 1.9 (8A) 1.85 (5P) 1.8   Insulin sensitivity factor: (MN) 30 (3A) 20 (8P) 30  Insulin to carb ratio:(MN) 5 (3A) 3 (8A) 4.0 (5P) 4.5 (8P) 5  BG target: 120  Active Insulin time: 4 hr     Type of insulin:  Humalog     Backup Plan: Would take 40 units long acting insulin, will check expiration date on insulin pen.   Aware that in case of malfunctioning of the pump or unexplained hyperglycemia to use basal and bolus therapy as backup which is prescribed to the patient. Also notified patient to call clinic and/or pump company if any issues or go to emergency department if signs/symptoms of DKA.      Hypoglycemia: Has baqsimi in case of severe hypoglycemia.      On Statin.      Has hypothyroidism: Taking levothyroxine 125 mcg daily, regularly and properly. Experiencing hypoglycemia denies bowel pattern changes, headaches, temperature intolerance, or hair/skin,nail changes.    History obtained from : patient  Review of Systems  See HPI.   All other systems reviewed and are negative.    Medical History Reviewed by provider this encounter:  Tobacco  Allergies  Meds  Problems  Med Hx  Surg Hx  Fam  Hx       Current Outpatient Medications on File Prior to Visit   Medication Sig Dispense Refill    atorvastatin (LIPITOR) 20 mg tablet TAKE 1 TABLET BY MOUTH DAILY 90 tablet 3    Cholecalciferol (VITAMIN D3 PO) Take 1,000 Int'l Units by mouth      Continuous Blood Gluc  (Dexcom G7 ) SAIMA Use 1 Device continuous 1 each 0    Contour Next Test test strip USE TO TEST BLOOD SUGAR 5 TIMES  DAILY 500 strip 3    levothyroxine 125 mcg tablet TAKE 1 TABLET BY MOUTH DAILY 90 tablet 1    Multiple Vitamins-Minerals (CENTRUM SILVER 50+WOMEN PO) Take by mouth      Nortrel 7/7/7 0.5/0.75/1-35 MG-MCG per tablet TAKE 1 TABLET BY MOUTH DAILY 84 tablet 3    [DISCONTINUED] Accu-Chek FastClix Lancets MISC CHECK BLOOD SUGAR 4 TIMES DAILY 400 each 1    [DISCONTINUED] HumaLOG 100 UNIT/ML injection INJECT SUBCUTANEOUSLY UP TO 80  UNITS DAILY VIA INSULIN PUMP 80 mL 3    albuterol (Proventil HFA) 90 mcg/act inhaler Inhale 2 puffs every 6 (six) hours as needed for wheezing 6.7 g 5    Baqsimi Two Pack 3 MG/DOSE POWD INSERT DEVICE TIP INTO 1 NOSTRIL PUSH DEVICE PLUNGER UNTIL GREEN  LINE DISAPPEARS AS NEEDED FOR   HYPOGLYCEMIA 2 each 3    EPINEPHrine (EPIPEN) 0.3 mg/0.3 mL SOAJ INJECT 1 PEN INTRAMUSCULARLY AS  NEEDED FOR ALLERGIC RESPONSE AS  DIRECTED BY MD. SEEK MEDICAL  HELP AFTER USE 6 each 0    naproxen (EC NAPROSYN) 500 MG EC tablet Take 1 tablet (500 mg total) by mouth 2 (two) times a day with meals 180 tablet 1    SUMAtriptan (IMITREX) 100 mg tablet TAKE 1 TAB FOR MIGRAINE HEADACHE may repeat in 2 hours if necessary. Max dose 200mg in 24 hour period. 10 tablet 5     No current facility-administered medications on file prior to visit.          Objective     /80 (BP Location: Left arm, Patient Position: Sitting, Cuff Size: Large)   Ht 5' (1.524 m)   Wt 93.4 kg (206 lb)   BMI 40.23 kg/m²        Physical Exam  Vitals reviewed.   Constitutional:       Appearance: Normal appearance.   Cardiovascular:      Rate and  Rhythm: Normal rate and regular rhythm.      Pulses: Normal pulses.      Heart sounds: Normal heart sounds.   Pulmonary:      Effort: Pulmonary effort is normal.      Breath sounds: Normal breath sounds.   Skin:     General: Skin is warm and dry.      Capillary Refill: Capillary refill takes less than 2 seconds.   Neurological:      General: No focal deficit present.      Mental Status: He is alert and oriented to person, place, and time.   Psychiatric:         Mood and Affect: Mood normal.         Behavior: Behavior normal.     Administrative Statements      Yes

## 2024-09-30 NOTE — ASSESSMENT & PLAN NOTE
Lab Results   Component Value Date    HGBA1C 7.0 (H) 09/14/2024     HGA1C close to goal. Continue on current regimen. Will adjust, as needed if starts GLP-1 for weight loss.    Discussed risks/complications associated with uncontrolled diabetes including organ involvement, heart attack, stroke, death.     Advised lifestyle modifications including attention to diet including the amount and types of carbohydrates consumed and regular activity.     Call for blood sugars less than 70 mg/dl or patterns over 250 mg/dl.     Monitor blood glucose levels at least 2 times a day, if on insulin ideally before all insulin administration times.     Discussed symptoms and treatment of hypoglycemia.  Reviewed risks associated with hypoglycemia. Always carry rapid acting carbohydrates and a glucometer (a way to check your blood sugar).    Recommendation for medical identification either bracelet, necklace.    Recommendation for glucagon if on insulin.     Routine follow up for diabetic eye and foot exams.     Ordered blood work to complete prior to next visit.    Send glucose logs/CGM download in 1-2 weeks for review    Follow up in 3 months.     Orders:    Hemoglobin A1C; Future    Comprehensive metabolic panel; Future    Hemoglobin A1C    Comprehensive metabolic panel    Accu-Chek FastClix Lancets MISC; CHECK BLOOD SUGAR 4 TIMES DAILY    HumaLOG 100 UNIT/ML injection; INJECT SUBCUTANEOUSLY UP   UNITS DAILY VIA INSULIN PUMP

## 2024-10-02 ENCOUNTER — HOSPITAL ENCOUNTER (OUTPATIENT)
Dept: RADIOLOGY | Facility: HOSPITAL | Age: 54
Discharge: HOME | End: 2024-10-02
Payer: COMMERCIAL

## 2024-10-02 VITALS — WEIGHT: 206 LBS | BODY MASS INDEX: 40.44 KG/M2 | HEIGHT: 60 IN

## 2024-10-02 DIAGNOSIS — Z12.31 ENCOUNTER FOR SCREENING MAMMOGRAM FOR BREAST CANCER: ICD-10-CM

## 2024-10-02 PROCEDURE — 77063 BREAST TOMOSYNTHESIS BI: CPT

## 2024-10-02 PROCEDURE — 77067 SCR MAMMO BI INCL CAD: CPT

## 2024-10-03 DIAGNOSIS — R92.8 ABNORMALITY OF RIGHT BREAST ON SCREENING MAMMOGRAPHY: Primary | ICD-10-CM

## 2024-10-04 ENCOUNTER — TELEPHONE (OUTPATIENT)
Dept: RADIOLOGY | Facility: HOSPITAL | Age: 54
End: 2024-10-04

## 2024-11-07 ENCOUNTER — HOSPITAL ENCOUNTER (OUTPATIENT)
Dept: RADIOLOGY | Facility: HOSPITAL | Age: 54
Discharge: HOME | End: 2024-11-07
Payer: COMMERCIAL

## 2024-11-07 DIAGNOSIS — R92.8 ABNORMALITY OF RIGHT BREAST ON SCREENING MAMMOGRAPHY: ICD-10-CM

## 2024-11-07 PROCEDURE — G0279 TOMOSYNTHESIS, MAMMO: HCPCS

## 2024-11-07 PROCEDURE — 77065 DX MAMMO INCL CAD UNI: CPT

## 2024-11-07 PROCEDURE — 76642 ULTRASOUND BREAST LIMITED: CPT

## 2024-11-08 ENCOUNTER — TELEPHONE (OUTPATIENT)
Age: 54
End: 2024-11-08

## 2024-11-08 DIAGNOSIS — E10.65 TYPE 1 DIABETES MELLITUS WITH HYPERGLYCEMIA (HCC): ICD-10-CM

## 2024-11-08 DIAGNOSIS — T78.40XD ALLERGY, SUBSEQUENT ENCOUNTER: ICD-10-CM

## 2024-11-08 RX ORDER — INSULIN LISPRO 100 [IU]/ML
INJECTION, SOLUTION INTRAVENOUS; SUBCUTANEOUS
Qty: 90 ML | Refills: 3 | Status: SHIPPED | OUTPATIENT
Start: 2024-11-08

## 2024-11-08 RX ORDER — GLUCAGON 3 MG/1
POWDER NASAL
Qty: 2 EACH | Refills: 3 | Status: SHIPPED | OUTPATIENT
Start: 2024-11-08

## 2024-11-08 RX ORDER — EPINEPHRINE 0.3 MG/.3ML
INJECTION SUBCUTANEOUS
Qty: 6 EACH | Refills: 0 | Status: SHIPPED | OUTPATIENT
Start: 2024-11-08

## 2024-11-08 NOTE — TELEPHONE ENCOUNTER
The pt called stating she needs a refill of her Humalog sent to [facility] but at last appt they talked about increasing the dosage so if that could be verified and then submit a new script for her please.

## 2024-11-08 NOTE — TELEPHONE ENCOUNTER
The pt called stating she needs a refill of her Humalog sent to [facility] but at last appt they talked about increasing the dosage so if that could be verified and then submit a new script for her please. Please review for refill. Thank you

## 2024-11-12 ENCOUNTER — TELEPHONE (OUTPATIENT)
Dept: ENDOCRINOLOGY | Facility: CLINIC | Age: 54
End: 2024-11-12

## 2024-12-04 ENCOUNTER — RA CDI HCC (OUTPATIENT)
Dept: OTHER | Facility: HOSPITAL | Age: 54
End: 2024-12-04

## 2024-12-04 DIAGNOSIS — E66.01 MORBID OBESITY (HCC): Primary | ICD-10-CM

## 2024-12-11 ENCOUNTER — OFFICE VISIT (OUTPATIENT)
Dept: FAMILY MEDICINE CLINIC | Facility: CLINIC | Age: 54
End: 2024-12-11
Payer: COMMERCIAL

## 2024-12-11 VITALS
HEIGHT: 60 IN | HEART RATE: 86 BPM | RESPIRATION RATE: 18 BRPM | OXYGEN SATURATION: 99 % | WEIGHT: 206 LBS | BODY MASS INDEX: 40.44 KG/M2 | TEMPERATURE: 97.7 F | SYSTOLIC BLOOD PRESSURE: 126 MMHG | DIASTOLIC BLOOD PRESSURE: 84 MMHG

## 2024-12-11 DIAGNOSIS — E03.4 HYPOTHYROIDISM DUE TO ACQUIRED ATROPHY OF THYROID: ICD-10-CM

## 2024-12-11 DIAGNOSIS — G43.009 MIGRAINE WITHOUT AURA AND WITHOUT STATUS MIGRAINOSUS, NOT INTRACTABLE: ICD-10-CM

## 2024-12-11 DIAGNOSIS — E78.2 MIXED HYPERLIPIDEMIA: ICD-10-CM

## 2024-12-11 DIAGNOSIS — E10.65 TYPE 1 DIABETES MELLITUS WITH HYPERGLYCEMIA (HCC): Primary | ICD-10-CM

## 2024-12-11 DIAGNOSIS — K21.00 GASTROESOPHAGEAL REFLUX DISEASE WITH ESOPHAGITIS WITHOUT HEMORRHAGE: ICD-10-CM

## 2024-12-11 LAB — SL AMB POCT HEMOGLOBIN AIC: 6.8 (ref ?–6.5)

## 2024-12-11 PROCEDURE — 83036 HEMOGLOBIN GLYCOSYLATED A1C: CPT | Performed by: FAMILY MEDICINE

## 2024-12-11 PROCEDURE — 99214 OFFICE O/P EST MOD 30 MIN: CPT | Performed by: FAMILY MEDICINE

## 2024-12-11 NOTE — ASSESSMENT & PLAN NOTE
STABLE  DENIES ANY CP, INDIGESTION, OR COUGH  NOTES NO MELENA OR HEMATOCHEZIA  NO HEMATEMESIS  COMPLIANT WITH MEDICATION  NO CONCERNS    - CONTINUE CURRENT TREATMENT PLAN  - MEDICATION AS PRESCRIBED  - AVOID CAFFEINE, ALCOHOL OR SMOKING

## 2024-12-11 NOTE — PROGRESS NOTES
Name: Layla Chaves      : 1970      MRN: [id number]  Encounter Provider: Nash Cantu MD  Encounter Date: 2024   Encounter department: [facility]    Assessment & Plan  Type 1 diabetes mellitus with hyperglycemia (HCC)    Lab Results   Component Value Date    HGBA1C 6.8 (A) 2024       COMPLIANT WITH MEDICATION  DENIES ANY NUMBNESS, TINGLING IN FEET    - DISCUSSED DIETARY MODIFICATION OF CARBOHYDRATES  - HGB A1C AND URINE STUDIES DUE TODAY  - FOOT CARE  - RV 3 MONTHS    Orders:  •  Hemoglobin A1c (w/out EAG); Future  •  POCT hemoglobin A1c    Mixed hyperlipidemia  WATCHING CHOLESTEROL INTAKE  COMPLIANT WITH MEDICATION  NO CONCERNS    - CONTINUE TO MONITOR DIETARY CHOL INTAKE  - CONTINUE CURRENT MEDICATION  - ENCOURAGED PHYSICAL ACTIVITY         Hypothyroidism due to acquired atrophy of thyroid  STABLE         Gastroesophageal reflux disease with esophagitis without hemorrhage  STABLE  DENIES ANY CP, INDIGESTION, OR COUGH  NOTES NO MELENA OR HEMATOCHEZIA  NO HEMATEMESIS  COMPLIANT WITH MEDICATION  NO CONCERNS    - CONTINUE CURRENT TREATMENT PLAN  - MEDICATION AS PRESCRIBED  - AVOID CAFFEINE, ALCOHOL OR SMOKING         Migraine without aura and without status migrainosus, not intractable              History of Present Illness     PATIENT RETURNS FOR ROUTINE EVALUATION OF PATIENT'S MEDICAL ISSUES    INDIVIDUAL MEDICAL ISSUES WITH THEIR CURRENT STATUS, ASSESSMENT AND PLANS ARE LISTED ABOVE            Review of Systems   Constitutional:  Negative for chills, fatigue and fever.   HENT:  Negative for congestion, ear discharge, ear pain, mouth sores, postnasal drip, sore throat and trouble swallowing.    Eyes:  Negative for pain, discharge and visual disturbance.   Respiratory:  Negative for cough, shortness of breath and wheezing.    Cardiovascular:  Negative for chest pain, palpitations and leg swelling.   Gastrointestinal:  Negative for abdominal distention, abdominal  pain, blood in stool, diarrhea and nausea.   Endocrine: Negative for polydipsia, polyphagia and polyuria.   Genitourinary:  Negative for dysuria, frequency, hematuria and urgency.   Musculoskeletal:  Negative for arthralgias, gait problem and joint swelling.   Skin:  Negative for pallor and rash.   Neurological:  Negative for dizziness, syncope, speech difficulty, weakness, light-headedness, numbness and headaches.   Hematological:  Negative for adenopathy.   Psychiatric/Behavioral:  Negative for behavioral problems, confusion and sleep disturbance. The patient is not nervous/anxious.      Past Medical History:   Diagnosis Date   • COVID-19 virus infection 2020   • Diabetes (HCC)    • Diabetes mellitus (HCC) type 1   • Disease of thyroid gland    • Hyperlipidemia     last assessed 5/11/15   • Migraines      Past Surgical History:   Procedure Laterality Date   • APPENDECTOMY     • CARPAL TUNNEL RELEASE Bilateral    • CYST REMOVAL      hand, tendon cyst   • GANGLION CYST EXCISION     • TONSILLECTOMY     • WISDOM TOOTH EXTRACTION       Family History   Problem Relation Age of Onset   • No Known Problems Mother    • Hypertension Father    • No Known Problems Brother    • Thyroid disease unspecified Maternal Aunt    • No Known Problems Maternal Uncle    • No Known Problems Paternal Aunt    • No Known Problems Paternal Uncle    • Liver cancer Maternal Grandmother    • Cancer Maternal Grandmother    • Frontotemporal dementia Maternal Grandfather    • No Known Problems Paternal Grandmother    • Diabetes type II Paternal Grandfather    • Diabetes Paternal Grandfather    • Mental illness Neg Hx    • Substance Abuse Neg Hx      Social History     Tobacco Use   • Smoking status: Former     Current packs/day: 0.00     Average packs/day: 1 pack/day for 20.0 years (20.0 ttl pk-yrs)     Types: Cigarettes     Start date: 3/28/1993     Quit date: 3/29/2013     Years since quittin.7     Passive exposure: Never   • Smokeless  tobacco: Never   Vaping Use   • Vaping status: Never Used   Substance and Sexual Activity   • Alcohol use: No   • Drug use: No   • Sexual activity: Not Currently     Partners: Male     Birth control/protection: Pill     Comment: oral contraception     Current Outpatient Medications on File Prior to Visit   Medication Sig   • Accu-Chek FastClix Lancets MISC CHECK BLOOD SUGAR 4 TIMES DAILY   • albuterol (Proventil HFA) 90 mcg/act inhaler Inhale 2 puffs every 6 (six) hours as needed for wheezing   • atorvastatin (LIPITOR) 20 mg tablet TAKE 1 TABLET BY MOUTH DAILY   • Baqsimi Two Pack 3 MG/DOSE POWD INSERT DEVICE TIP INTO 1 NOSTRIL PUSH DEVICE PLUNGER UNTIL GREEN  LINE DISAPPEARS AS NEEDED FOR  HYPOGLYCEMIA   • Cholecalciferol (VITAMIN D3 PO) Take 1,000 Int'l Units by mouth   • Continuous Blood Gluc  (Dexcom G7 ) SAIMA Use 1 Device continuous   • Contour Next Test test strip USE TO TEST BLOOD SUGAR 5 TIMES  DAILY   • EPINEPHrine (EPIPEN) 0.3 mg/0.3 mL SOAJ INJECT THE CONTENTS OF 1 PEN  INTRAMUSCULARLY AS NEEDED FOR  ALLERGIC RESPONSE AS DIRECTED BY MD. SEEK MEDICAL HELP AFTER USE   • HumaLOG 100 UNIT/ML injection INJECT SUBCUTANEOUSLY UP   UNITS DAILY VIA INSULIN PUMP   • levothyroxine 125 mcg tablet TAKE 1 TABLET BY MOUTH DAILY   • Multiple Vitamins-Minerals (CENTRUM SILVER 50+WOMEN PO) Take by mouth   • naproxen (EC NAPROSYN) 500 MG EC tablet Take 1 tablet (500 mg total) by mouth 2 (two) times a day with meals   • Nortrel 7/7/7 0.5/0.75/1-35 MG-MCG per tablet TAKE 1 TABLET BY MOUTH DAILY   • SUMAtriptan (IMITREX) 100 mg tablet TAKE 1 TAB FOR MIGRAINE HEADACHE may repeat in 2 hours if necessary. Max dose 200mg in 24 hour period.     Allergies   Allergen Reactions   • Penicillins    • Sulfites - Food Allergy      Immunization History   Administered Date(s) Administered   • COVID-19 MODERNA VACC 0.25 ML IM BOOSTER 11/23/2021   • COVID-19 MODERNA VACC 0.5 ML IM 03/17/2021, 04/16/2021   • INFLUENZA  10/23/2018, 10/23/2018, 10/23/2022   • Influenza Injectable, MDCK, Preservative Free, Quadrivalent, 0.5 mL 11/10/2020   • Influenza Quadrivalent 6 mos and older IM 03/30/2022   • Influenza Quadrivalent Preservative Free 3 years and older IM 10/01/2016   • Influenza, seasonal, injectable 09/17/2014   • Influenza, seasonal, injectable, preservative free 09/15/2015   • Pneumococcal Conjugate 13-Valent 11/21/2018   • Pneumococcal Conjugate Vaccine 20-valent (Pcv20), Polysace 06/20/2024   • Pneumococcal Polysaccharide PPV23 12/03/2001, 04/03/2013   • Tdap 05/23/2007, 11/13/2018, 03/17/2024   • Zoster Vaccine Recombinant 07/05/2022, 10/04/2022     Objective   /84 (BP Location: Left arm, Patient Position: Sitting, Cuff Size: Standard)   Pulse 86   Temp 97.7 °F (36.5 °C) (Temporal)   Resp 18   Ht 5' (1.524 m)   Wt 93.4 kg (206 lb)   SpO2 99%   BMI 40.23 kg/m²     Physical Exam  Constitutional:       General: She is not in acute distress.     Appearance: Normal appearance. She is well-developed. She is obese. She is not ill-appearing.   HENT:      Head: Normocephalic and atraumatic.      Nose: Nose normal.      Mouth/Throat:      Mouth: Mucous membranes are moist.   Eyes:      General:         Right eye: No discharge.         Left eye: No discharge.      Conjunctiva/sclera: Conjunctivae normal.      Pupils: Pupils are equal, round, and reactive to light.   Neck:      Thyroid: No thyromegaly.   Cardiovascular:      Rate and Rhythm: Normal rate and regular rhythm.      Heart sounds: Normal heart sounds. No murmur heard.  Pulmonary:      Effort: Pulmonary effort is normal. No respiratory distress.      Breath sounds: Normal breath sounds. No wheezing or rales.   Abdominal:      General: Bowel sounds are normal.      Palpations: Abdomen is soft.      Tenderness: There is no abdominal tenderness.   Musculoskeletal:         General: No tenderness. Normal range of motion.      Cervical back: Normal range of motion and  neck supple.   Lymphadenopathy:      Cervical: No cervical adenopathy.   Skin:     General: Skin is warm and dry.      Findings: No erythema or rash.   Neurological:      General: No focal deficit present.      Mental Status: She is alert and oriented to person, place, and time.   Psychiatric:         Behavior: Behavior normal.         Thought Content: Thought content normal.         Judgment: Judgment normal.

## 2024-12-11 NOTE — ASSESSMENT & PLAN NOTE
WATCHING CHOLESTEROL INTAKE  COMPLIANT WITH MEDICATION  NO CONCERNS    - CONTINUE TO MONITOR DIETARY CHOL INTAKE  - CONTINUE CURRENT MEDICATION  - ENCOURAGED PHYSICAL ACTIVITY

## 2024-12-11 NOTE — ASSESSMENT & PLAN NOTE
Lab Results   Component Value Date    HGBA1C 6.8 (A) 12/11/2024       COMPLIANT WITH MEDICATION  DENIES ANY NUMBNESS, TINGLING IN FEET    - DISCUSSED DIETARY MODIFICATION OF CARBOHYDRATES  - HGB A1C AND URINE STUDIES DUE TODAY  - FOOT CARE  - RV 3 MONTHS    Orders:  •  Hemoglobin A1c (w/out EAG); Future  •  POCT hemoglobin A1c

## 2025-01-04 DIAGNOSIS — E78.2 MIXED HYPERLIPIDEMIA: ICD-10-CM

## 2025-01-06 RX ORDER — ATORVASTATIN CALCIUM 20 MG/1
20 TABLET, FILM COATED ORAL DAILY
Qty: 90 TABLET | Refills: 1 | Status: SHIPPED | OUTPATIENT
Start: 2025-01-06

## 2025-01-12 LAB
ALBUMIN SERPL-MCNC: 4.3 G/DL (ref 3.8–4.9)
ALP SERPL-CCNC: 107 IU/L (ref 44–121)
ALT SERPL-CCNC: 15 IU/L (ref 0–32)
AST SERPL-CCNC: 24 IU/L (ref 0–40)
BILIRUB SERPL-MCNC: 0.2 MG/DL (ref 0–1.2)
BUN SERPL-MCNC: 9 MG/DL (ref 6–24)
BUN/CREAT SERPL: 12 (ref 9–23)
CALCIUM SERPL-MCNC: 9.2 MG/DL (ref 8.7–10.2)
CHLORIDE SERPL-SCNC: 101 MMOL/L (ref 96–106)
CHOLEST SERPL-MCNC: 155 MG/DL (ref 100–199)
CHOLEST/HDLC SERPL: 2.7 RATIO (ref 0–4.4)
CO2 SERPL-SCNC: 17 MMOL/L (ref 20–29)
CREAT SERPL-MCNC: 0.74 MG/DL (ref 0.57–1)
EGFR: 95 ML/MIN/1.73
EST. AVERAGE GLUCOSE BLD GHB EST-MCNC: 166 MG/DL
GLOBULIN SER-MCNC: 2.8 G/DL (ref 1.5–4.5)
GLUCOSE SERPL-MCNC: 114 MG/DL (ref 70–99)
HBA1C MFR BLD: 7.4 % (ref 4.8–5.6)
HDLC SERPL-MCNC: 57 MG/DL
LDLC SERPL CALC-MCNC: 62 MG/DL (ref 0–99)
POTASSIUM SERPL-SCNC: 4.7 MMOL/L (ref 3.5–5.2)
PROT SERPL-MCNC: 7.1 G/DL (ref 6–8.5)
SL AMB VLDL CHOLESTEROL CALC: 36 MG/DL (ref 5–40)
SODIUM SERPL-SCNC: 139 MMOL/L (ref 134–144)
T4 FREE SERPL-MCNC: 1.39 NG/DL (ref 0.82–1.77)
TRIGL SERPL-MCNC: 222 MG/DL (ref 0–149)
TSH SERPL DL<=0.005 MIU/L-ACNC: 7.05 UIU/ML (ref 0.45–4.5)

## 2025-01-13 ENCOUNTER — RESULTS FOLLOW-UP (OUTPATIENT)
Dept: ENDOCRINOLOGY | Facility: CLINIC | Age: 55
End: 2025-01-13

## 2025-01-14 DIAGNOSIS — Z30.41 ORAL CONTRACEPTIVE PILL SURVEILLANCE: ICD-10-CM

## 2025-01-15 RX ORDER — NORETHINDRONE AND ETHINYL ESTRADIOL 7 DAYS X 3
1 KIT ORAL DAILY
Qty: 84 TABLET | Refills: 1 | Status: SHIPPED | OUTPATIENT
Start: 2025-01-15

## 2025-01-22 ENCOUNTER — OFFICE VISIT (OUTPATIENT)
Dept: ENDOCRINOLOGY | Facility: CLINIC | Age: 55
End: 2025-01-22
Payer: COMMERCIAL

## 2025-01-22 VITALS
HEIGHT: 60 IN | HEART RATE: 82 BPM | DIASTOLIC BLOOD PRESSURE: 68 MMHG | SYSTOLIC BLOOD PRESSURE: 110 MMHG | WEIGHT: 207 LBS | BODY MASS INDEX: 40.64 KG/M2 | OXYGEN SATURATION: 98 %

## 2025-01-22 DIAGNOSIS — E03.9 HYPOTHYROIDISM, UNSPECIFIED TYPE: ICD-10-CM

## 2025-01-22 DIAGNOSIS — E11.65 TYPE 2 DIABETES MELLITUS WITH HYPERGLYCEMIA, WITH LONG-TERM CURRENT USE OF INSULIN (HCC): ICD-10-CM

## 2025-01-22 DIAGNOSIS — Z79.4 TYPE 2 DIABETES MELLITUS WITH HYPERGLYCEMIA, WITH LONG-TERM CURRENT USE OF INSULIN (HCC): ICD-10-CM

## 2025-01-22 DIAGNOSIS — E78.2 MIXED HYPERLIPIDEMIA: ICD-10-CM

## 2025-01-22 DIAGNOSIS — E10.65 TYPE 1 DIABETES MELLITUS WITH HYPERGLYCEMIA (HCC): Primary | ICD-10-CM

## 2025-01-22 PROCEDURE — 99204 OFFICE O/P NEW MOD 45 MIN: CPT | Performed by: NURSE PRACTITIONER

## 2025-01-22 PROCEDURE — 95251 CONT GLUC MNTR ANALYSIS I&R: CPT | Performed by: NURSE PRACTITIONER

## 2025-01-22 RX ORDER — LEVOTHYROXINE SODIUM 137 UG/1
137 TABLET ORAL DAILY
Qty: 90 TABLET | Refills: 2 | Status: SHIPPED | OUTPATIENT
Start: 2025-01-22

## 2025-01-22 NOTE — ASSESSMENT & PLAN NOTE
Lab Results   Component Value Date    HGBA1C 7.4 (H) 01/11/2025     HGA1C above goal with insulin resistance.     Discussed risks/complications associated with uncontrolled diabetes including organ involvement, heart attack, stroke, death.    Advised lifestyle modifications including attention to diet including the amount and types of carbohydrates consumed and regular activity.     Call for blood sugars less than 70 mg/dl or patterns over 250 mg/dl.     Discussed symptoms and treatment of hypoglycemia.  Reviewed risks associated with hypoglycemia. Always carry rapid acting carbohydrates and a glucometer (a way to check your blood sugar).    Recommendation for medical identification either bracelet, necklace.    Recommendation for glucagon if on insulin.     Routine follow up for diabetic eye and foot exams.     Ordered blood work to complete prior to next visit.    Send glucose logs/CGM download in 1-2 weeks for review    Follow up in 3 months.       Orders:    Hemoglobin A1C; Future    Comprehensive metabolic panel; Future

## 2025-01-22 NOTE — ASSESSMENT & PLAN NOTE
Clinically symptomatic and biochemically underreplaced.   Recommend increasing Synthroid 137 mcg daily.   Orders:    TSH, 3rd generation; Future    T4, free; Future    levothyroxine (Synthroid) 137 mcg tablet; Take 1 tablet (137 mcg total) by mouth daily

## 2025-01-22 NOTE — PATIENT INSTRUCTIONS
"Low blood sugar in people with diabetes   The Basics   Written by the doctors and editors at [facility]   What is low blood sugar? -- This is when the level of sugar in a person's blood gets too low. It is also called \"hypoglycemia.\"  Low blood sugar can cause symptoms ranging from sweating and feeling hungry to passing out.  Low blood sugar can happen in people with diabetes who take certain medicines, including insulin, other medicines given as shots, and some types of pills.  When can people with diabetes get low blood sugar? -- People with diabetes can get low blood sugar when they:   Take too much medicine, including insulin, other medicines given as shots, or certain diabetes pills   Do not eat enough food   Exercise too much without eating a snack or reducing their insulin dose   Wait too long between meals   Drink too much alcohol or drink alcohol on an empty stomach  What are the symptoms of low blood sugar? -- The symptoms can be different from person to person, and can change over time. During the early stages of low blood sugar, a person can:   Sweat or tremble   Feel hungry   Feel worried  People who have early symptoms should check their blood sugar level to see if it is low and needs to be treated. If low blood sugar levels are not treated, severe symptoms can occur. These can include:   Trouble walking or feeling weak   Trouble seeing clearly   Being confused or acting in a strange way   Passing out or having a seizure  Some people do not get symptoms during the early stages of low blood sugar. Doctors sometimes call this \"hypoglycemia unawareness.\" People with hypoglycemia unawareness are more likely to have severe symptoms, because they might not know that they have low blood sugar until they have severe symptoms. Hypoglycemia unawareness is more likely in people who:   Have had type 1 diabetes for more than 5 to 10 years   Have frequent episodes of low blood sugar   Use insulin to keep their blood " sugar level tightly managed   Are tired   Drink a lot of alcohol   Take certain medicines for high blood pressure or diabetes  How is low blood sugar treated? -- It can be treated with:   Quick sources of sugar - People can eat or drink quick sources of sugar (table 1). Foods that have fat, such as chocolate or cheese, do not treat low blood sugar as quickly. You and a family member should carry a quick source of sugar at all times.   A dose of glucagon - Glucagon is a hormone that can quickly raise blood sugar levels and stop severe symptoms. It comes as a shot (figure 1) or a nose spray. If your doctor recommends that you carry glucagon with you, they will tell you when and how to use it. If possible, it's also a good idea to have a family member, friend, or roommate learn how to give you glucagon. That way, they can give it to you if you can't do it yourself.  After treating low blood sugar, it is very important to recheck your blood sugar level to make sure that it rises and stays in the normal range. Once your blood sugar is normal, eat a small snack that contains protein, fat, and carbohydrate. This can help keep your blood sugar stable.  What should I do after treatment? -- After treatment for low blood sugar, most people can get back to their usual routine. But your doctor or nurse might recommend that you check your blood sugar level more often during the next 2 to 3 days.  If your low blood sugar was treated with glucagon, call your doctor or nurse. They might change the dose of your diabetes medicine.  How can I prevent low blood sugar? -- The best way is to:   Check your blood sugar levels often - Your doctor or nurse will tell you how and when to check your blood sugar levels at home. They will also tell you what your blood sugar levels should be, and when to treat low blood sugar.   Learn the symptoms of low blood sugar, and be ready to treat it in the early stages. Treating low blood sugar early can  "prevent severe symptoms.  When should I go to a hospital or call for an ambulance? -- A family member or friend should take you to a hospital or call for an ambulance (in the [place] and [place], call 9-1-1) if you:   Are still confused 15 minutes after being treated with a dose of glucagon   Have passed out, and there is no glucagon nearby   Still have low blood sugar after treatment  If you have low blood sugar, do not try to drive yourself to the hospital. Driving with low blood sugar can be dangerous.  All topics are updated as new evidence becomes available and our peer review process is complete.  This topic retrieved from [employer] on: Apr 11, 2024.  Topic 97755 Version 22.0  Release: 32.3.2 - C32.100  © 2024 UpToDate, Inc. and/or its affiliates. All rights reserved.  table 1: Quick sources of sugar to treat low blood sugar  3 or 4 glucose tablets   ½ cup of juice or regular soda (not sugar-free)   2 tablespoons of raisins   4 or 5 saltine crackers   1 tablespoon of sugar   1 tablespoon of honey or corn syrup   6 to 8 hard candies   These sources of sugar act quickly to treat low blood sugar levels. People with diabetes who use insulin or certain other diabetes medicines should carry at least 1 of these items at all times.  Graphic 77169 Version 4.0  figure 1: Glucagon autoinjector     Some people carry glucagon in the form of an autoinjector \"pen.\" This makes it easy to give a dose into the upper arm, thigh, or belly.  Graphic 243837 Version 2.0  Consumer Information Use and Disclaimer   Disclaimer: This generalized information is a limited summary of diagnosis, treatment, and/or medication information. It is not meant to be comprehensive and should be used as a tool to help the user understand and/or assess potential diagnostic and treatment options. It does NOT include all information about conditions, treatments, medications, side effects, or risks that may apply to a specific patient. It is not intended to be " medical advice or a substitute for the medical advice, diagnosis, or treatment of a health care provider based on the health care provider's examination and assessment of a patient's specific and unique circumstances. Patients must speak with a health care provider for complete information about their health, medical questions, and treatment options, including any risks or benefits regarding use of medications. This information does not endorse any treatments or medications as safe, effective, or approved for treating a specific patient. UpToDate, Inc. and its affiliates disclaim any warranty or liability relating to this information or the use thereof.The use of this information is governed by the Terms of Use, available at https://www.[employer].com/en/know/clinical-effectiveness-terms. 2024© UpToDate, Inc. and its affiliates and/or licensors. All rights reserved.  Copyright   © 2024 UpToDate, Inc. and/or its affiliates. All rights reserved.

## 2025-01-22 NOTE — PROGRESS NOTES
Name: Layla Chaves      : 1970      MRN: [id number]  Encounter Provider: FAIZA Andrade  Encounter Date: 2025   Encounter department: [facility]  :  Assessment & Plan  Type 1 diabetes mellitus with hyperglycemia (HCC)    Lab Results   Component Value Date    HGBA1C 7.4 (H) 2025     HGA1C above goal with insulin resistance.     Discussed risks/complications associated with uncontrolled diabetes including organ involvement, heart attack, stroke, death.    Advised lifestyle modifications including attention to diet including the amount and types of carbohydrates consumed and regular activity.     Call for blood sugars less than 70 mg/dl or patterns over 250 mg/dl.     Discussed symptoms and treatment of hypoglycemia.  Reviewed risks associated with hypoglycemia. Always carry rapid acting carbohydrates and a glucometer (a way to check your blood sugar).    Recommendation for medical identification either bracelet, necklace.    Recommendation for glucagon if on insulin.     Routine follow up for diabetic eye and foot exams.     Ordered blood work to complete prior to next visit.    Send glucose logs/CGM download in 1-2 weeks for review    Follow up in 3 months.       Orders:  •  Hemoglobin A1C; Future  •  Comprehensive metabolic panel; Future    Mixed hyperlipidemia  Cotinue on statin  Orders:  •  Lipid panel; Future    Hypothyroidism, unspecified type  Clinically symptomatic and biochemically underreplaced.   Recommend increasing Synthroid 137 mcg daily.   Orders:  •  TSH, 3rd generation; Future  •  T4, free; Future  •  levothyroxine (Synthroid) 137 mcg tablet; Take 1 tablet (137 mcg total) by mouth daily    Type 2 diabetes mellitus with hyperglycemia, with long-term current use of insulin (HCC)  Insulin resistance with additional insulin administered via insulin injection in addition to insulin pump, is using between 1.22-1.5 u/kg/day of  insulin.    Recommend starting on Mounjaro 2.5 mg weekly.     Discussed GLP-1 RA options.  Denies personal history of pancreatitis, family/personal history of thyroid c-cell tumors including medullary thyroid cancer, or family/personal history of multiple endocrine neoplasias. Reviewed mechanism of action. Patient is aware of possible side effects which include nausea, vomiting, diarrhea, constipation, bloating, upset stomach. The patient is aware that blood sugar logs/CGM download must be submitted for review before dose adjustment and reporting any side effects of the medication which may include appetite suppression, constipation, diarrhea, nausea, abdominal pain, fatigue/malaise. Counseled the patient on the importance of maintaining a healthy diet in addition to regular physical activity while on this medication.                 History of Present Illness   HPI  Layla Chaves is a 54 y.o. female who presents with a history of type 1 diabetes with long term use of insulin for approximately 10 years which has been uncomplicated.     Regular diabetic eye and foot exams. Denies DR-- no vision.  Denies neuropathy-- denies numbness or tingling.      Denies recent illness or hospitalizations.      Denies recent severe hypoglycemic or severe hyperglycemic episodes requiring medical attention.      Home glucose monitoring: are performed regularly with CGM.     TDD on pump 85-93 unitsdaily  30-50 units in addiiton injected      CGM Interpretation  Layla Chaves   Device used Dexcom for Personal Use  Indication: Type of Diabetes: Type 1 Diabetes  More than 72 hours of data was reviewed. Report to be scanned to chart.   Date Range: January 6-19, 2025  Analysis of data:   Average Glucose: 179 mg/dl  Coefficient of Variation: 37%  SD : 66 mg/dl  GMI: 7.6%  Time in Target Range: 55%  Time Above Range: 45%  Time Below Range: 0%   Interpretation of data:   Post-prandial hyperglycemia       Patient is on a Tandem pump prescribed  by endocrinology. She has been on a pump for many years.   She denies any malfunctioning of the pump.  Current Problems with Pump: Did not prefer integration.      Current Insulin pump settings:  Basal rate: (MN) 1.3 (3A) 1.9 (8A) 1.85 (5P) 1.8   Insulin sensitivity factor: (MN) 30 (3A) 20 (8P) 30  Insulin to carb ratio:(MN) 5 (3A) 3 (8A) 4.0 (5P) 4 (8P) 5  BG target: 120  Active Insulin time: 4 hr     Type of insulin:  Humalog     Backup Plan: Would take 40 units long acting insulin, will check expiration date on insulin pen.   Aware that in case of malfunctioning of the pump or unexplained hyperglycemia to use basal and bolus therapy as backup which is prescribed to the patient. Also notified patient to call clinic and/or pump company if any issues or go to emergency department if signs/symptoms of DKA.      Hypoglycemia: Has baqsimi in case of severe hypoglycemia.      On Statin.      Has hypothyroidism: Taking levothyroxine 125 mcg daily, regularly and properly.    History obtained from: patient    Medical History Reviewed by provider this encounter:  Tobacco  Allergies  Meds  Problems  Med Hx  Surg Hx  Fam Hx     .  Current Outpatient Medications on File Prior to Visit   Medication Sig Dispense Refill   • albuterol (Proventil HFA) 90 mcg/act inhaler Inhale 2 puffs every 6 (six) hours as needed for wheezing 6.7 g 5   • atorvastatin (LIPITOR) 20 mg tablet TAKE 1 TABLET BY MOUTH ONCE  DAILY 90 tablet 1   • Cholecalciferol (VITAMIN D3 PO) Take 1,000 Int'l Units by mouth     • Continuous Blood Gluc  (Dexcom G7 ) SAIMA Use 1 Device continuous 1 each 0   • Contour Next Test test strip USE TO TEST BLOOD SUGAR 5 TIMES  DAILY 500 strip 3   • HumaLOG 100 UNIT/ML injection INJECT SUBCUTANEOUSLY UP   UNITS DAILY VIA INSULIN PUMP 90 mL 3   • Multiple Vitamins-Minerals (CENTRUM SILVER 50+WOMEN PO) Take by mouth     • norethindrone-ethinyl estradiol (Dasetta 7/7/7) 0.5/0.75/1-35 MG-MCG per tablet  TAKE 1 TABLET BY MOUTH DAILY 84 tablet 1   • Accu-Chek FastClix Lancets MISC CHECK BLOOD SUGAR 4 TIMES DAILY 400 each 1   • Baqsimi Two Pack 3 MG/DOSE POWD INSERT DEVICE TIP INTO 1 NOSTRIL PUSH DEVICE PLUNGER UNTIL GREEN  LINE DISAPPEARS AS NEEDED FOR  HYPOGLYCEMIA 2 each 3   • EPINEPHrine (EPIPEN) 0.3 mg/0.3 mL SOAJ INJECT THE CONTENTS OF 1 PEN  INTRAMUSCULARLY AS NEEDED FOR  ALLERGIC RESPONSE AS DIRECTED BY MD. SEEK MEDICAL HELP AFTER USE 6 each 0   • SUMAtriptan (IMITREX) 100 mg tablet TAKE 1 TAB FOR MIGRAINE HEADACHE may repeat in 2 hours if necessary. Max dose 200mg in 24 hour period. 10 tablet 5     No current facility-administered medications on file prior to visit.         Objective   /68 (BP Location: Left arm, Patient Position: Sitting, Cuff Size: Large)   Pulse 82   Ht 5' (1.524 m)   Wt 93.9 kg (207 lb)   SpO2 98%   BMI 40.43 kg/m²         Physical Exam  Vitals reviewed.   Constitutional:       Appearance: Nontoxic appearance.   Cardiovascular:      Rate and Rhythm: Normal rate and regular rhythm.      Pulses: Normal pulses.      Heart sounds: Normal heart sounds.   Pulmonary:      Effort: Pulmonary effort is normal.      Breath sounds: Normal breath sounds.   Skin:     General: Skin is warm and dry.      Capillary Refill: Capillary refill takes less than 2 seconds.   Neurological:      General: No focal deficit present.      Mental Status: She is alert and oriented to person, place, and time.   Psychiatric:         Mood and Affect: Mood normal.         Behavior: Behavior normal.     Labs:   Component      Latest Ref Rng 1/11/2025   GLUCOSE      70 - 99 mg/dL 114 (H)    BUN      6 - 24 mg/dL 9    Creatinine      0.57 - 1.00 mg/dL 0.74    GFR, Calculated      >59 mL/min/1.73 95    SL AMB BUN/CREATININE RATIO      9 - 23  12    Sodium      134 - 144 mmol/L 139    Potassium      3.5 - 5.2 mmol/L 4.7    Chloride      96 - 106 mmol/L 101    Carbon Dioxide      20 - 29 mmol/L 17 (L)    CALCIUM       8.7 - 10.2 mg/dL 9.2    Total Protein      6.0 - 8.5 g/dL 7.1    Albumin      3.8 - 4.9 g/dL 4.3    Globulin, Total      1.5 - 4.5 g/dL 2.8    Total Bilirubin      0.0 - 1.2 mg/dL 0.2    ALKALINE PHOSPHATASE ISOENZYMES      44 - 121 IU/L 107    AST      0 - 40 IU/L 24    ALT      0 - 32 IU/L 15    Cholesterol      100 - 199 mg/dL 155    Triglycerides      0 - 149 mg/dL 222 (H)    HDL      >39 mg/dL 57    VLDL Cholesterol Anastacio      5 - 40 mg/dL 36    LDL Calculated      0 - 99 mg/dL 62    T. Chol/HDL Ratio      0.0 - 4.4 ratio 2.7    Hemoglobin A1C      4.8 - 5.6 % 7.4 (H)    eAG, EST AVG Glucose      mg/dL 166    TSH, POC      0.450 - 4.500 uIU/mL 7.050 (H)    FREE T4      0.82 - 1.77 ng/dL 1.39      Administrative Statements

## 2025-01-29 PROBLEM — E11.65 TYPE 2 DIABETES MELLITUS WITH HYPERGLYCEMIA, WITH LONG-TERM CURRENT USE OF INSULIN (HCC): Status: ACTIVE | Noted: 2025-01-29

## 2025-01-29 PROBLEM — Z79.4 TYPE 2 DIABETES MELLITUS WITH HYPERGLYCEMIA, WITH LONG-TERM CURRENT USE OF INSULIN (HCC): Status: ACTIVE | Noted: 2025-01-29

## 2025-01-29 NOTE — ASSESSMENT & PLAN NOTE
Insulin resistance with additional insulin administered via insulin injection in addition to insulin pump, is using between 1.22-1.5 u/kg/day of insulin.    Recommend starting on Mounjaro 2.5 mg weekly.     Discussed GLP-1 RA options.  Denies personal history of pancreatitis, family/personal history of thyroid c-cell tumors including medullary thyroid cancer, or family/personal history of multiple endocrine neoplasias. Reviewed mechanism of action. Patient is aware of possible side effects which include nausea, vomiting, diarrhea, constipation, bloating, upset stomach. The patient is aware that blood sugar logs/CGM download must be submitted for review before dose adjustment and reporting any side effects of the medication which may include appetite suppression, constipation, diarrhea, nausea, abdominal pain, fatigue/malaise. Counseled the patient on the importance of maintaining a healthy diet in addition to regular physical activity while on this medication.

## 2025-02-10 ENCOUNTER — TELEPHONE (OUTPATIENT)
Age: 55
End: 2025-02-10

## 2025-02-10 NOTE — TELEPHONE ENCOUNTER
"Infusion Cartridge/Reservoir    Infusion Cartridge, T:Slim, T:Lock connector, For use with all T:Slim pumps, Change every 3 days, Set (10) - [facility]    Quantity  1  Prescription Details  Day Supply - 90  Supplier  [facility]    Infusion Set    Infusion Set, TruSteel, 6mm cannula, 32\" tubing, Change every 1.5 - 2 days, Set (10) - [facility]    Quantity  1  Prescription Details  Day Supply - 90  Supplier  [facility]  "

## 2025-02-17 DIAGNOSIS — E10.65 TYPE 1 DIABETES MELLITUS WITH HYPERGLYCEMIA (HCC): ICD-10-CM

## 2025-02-18 RX ORDER — LANCETS
EACH MISCELLANEOUS
Qty: 408 EACH | Refills: 1 | Status: SHIPPED | OUTPATIENT
Start: 2025-02-18

## 2025-02-19 DIAGNOSIS — E10.65 TYPE 1 DIABETES MELLITUS WITH HYPERGLYCEMIA (HCC): ICD-10-CM

## 2025-02-19 RX ORDER — INSULIN LISPRO 100 [IU]/ML
INJECTION, SOLUTION INTRAVENOUS; SUBCUTANEOUS
Qty: 90 ML | Refills: 1 | Status: SHIPPED | OUTPATIENT
Start: 2025-02-19

## 2025-03-19 ENCOUNTER — OFFICE VISIT (OUTPATIENT)
Dept: FAMILY MEDICINE CLINIC | Facility: CLINIC | Age: 55
End: 2025-03-19
Payer: COMMERCIAL

## 2025-03-19 VITALS
HEIGHT: 60 IN | SYSTOLIC BLOOD PRESSURE: 148 MMHG | WEIGHT: 209 LBS | BODY MASS INDEX: 41.03 KG/M2 | RESPIRATION RATE: 18 BRPM | OXYGEN SATURATION: 99 % | TEMPERATURE: 97.7 F | DIASTOLIC BLOOD PRESSURE: 90 MMHG

## 2025-03-19 DIAGNOSIS — N30.01 ACUTE CYSTITIS WITH HEMATURIA: ICD-10-CM

## 2025-03-19 DIAGNOSIS — R39.9 UTI SYMPTOMS: Primary | ICD-10-CM

## 2025-03-19 PROBLEM — Z79.4 TYPE 2 DIABETES MELLITUS WITH HYPERGLYCEMIA, WITH LONG-TERM CURRENT USE OF INSULIN (HCC): Status: RESOLVED | Noted: 2025-01-29 | Resolved: 2025-03-19

## 2025-03-19 PROBLEM — E11.65 TYPE 2 DIABETES MELLITUS WITH HYPERGLYCEMIA, WITH LONG-TERM CURRENT USE OF INSULIN (HCC): Status: RESOLVED | Noted: 2025-01-29 | Resolved: 2025-03-19

## 2025-03-19 LAB
SL AMB  POCT GLUCOSE, UA: 50
SL AMB LEUKOCYTE ESTERASE,UA: NEGATIVE
SL AMB POCT BILIRUBIN,UA: NEGATIVE
SL AMB POCT BLOOD,UA: 50
SL AMB POCT CLARITY,UA: CLEAR
SL AMB POCT COLOR,UA: YELLOW
SL AMB POCT KETONES,UA: NEGATIVE
SL AMB POCT NITRITE,UA: NEGATIVE
SL AMB POCT PH,UA: 6
SL AMB POCT SPECIFIC GRAVITY,UA: 1.02
SL AMB POCT URINE PROTEIN: NEGATIVE
SL AMB POCT UROBILINOGEN: NORMAL

## 2025-03-19 PROCEDURE — 81002 URINALYSIS NONAUTO W/O SCOPE: CPT | Performed by: STUDENT IN AN ORGANIZED HEALTH CARE EDUCATION/TRAINING PROGRAM

## 2025-03-19 PROCEDURE — 99213 OFFICE O/P EST LOW 20 MIN: CPT | Performed by: STUDENT IN AN ORGANIZED HEALTH CARE EDUCATION/TRAINING PROGRAM

## 2025-03-19 RX ORDER — NITROFURANTOIN 25; 75 MG/1; MG/1
100 CAPSULE ORAL 2 TIMES DAILY
Qty: 10 CAPSULE | Refills: 0 | Status: SHIPPED | OUTPATIENT
Start: 2025-03-19 | End: 2025-03-24

## 2025-03-19 NOTE — PROGRESS NOTES
Name: Layla Chaves      : 1970      MRN: [id number]  Encounter Provider: Jackie Fernandez MD  Encounter Date: 3/19/2025   Encounter department: [facility]  :  Assessment & Plan  Acute cystitis with hematuria    Orders:  •  nitrofurantoin (MACROBID) 100 mg capsule; Take 1 capsule (100 mg total) by mouth 2 (two) times a day for 5 days    UTI symptoms    Orders:  •  POCT urine dip  •  Urine culture    Increase fluid hydration  Complete antibiotic- stop antibiotic if urine cx negative  Monitor for fever and/or chills  Notify office if symptoms not improved with antibiotic         History of Present Illness   HPI    Patient presents with lower pelvic pressure and urgency.  She notes this has been going on for about a week.  No fever or chills.  She does not have a history of kidney stones.  She does note ample fluid hydration.  She does have a history of type 1 diabetes.  She notes that she has been sleeping on a old mattress and this may have contributed to her pelvic pain.          Review of Systems   Constitutional:  Negative for activity change, chills, diaphoresis, fatigue and fever.   HENT:  Negative for congestion, postnasal drip, rhinorrhea and sore throat.    Respiratory:  Negative for cough, shortness of breath and wheezing.    Cardiovascular:  Negative for chest pain, palpitations and leg swelling.   Gastrointestinal:  Negative for abdominal pain, constipation, diarrhea, nausea and vomiting.   Genitourinary:  Positive for pelvic pain and urgency. Negative for difficulty urinating, dysuria, frequency and vaginal discharge.   Musculoskeletal:  Negative for myalgias.   Skin:  Negative for rash.   Neurological:  Negative for weakness, light-headedness and headaches.   Psychiatric/Behavioral:  The patient is not nervous/anxious.        Objective   /90   Temp 97.7 °F (36.5 °C) (Temporal)   Resp 18   Ht 5' (1.524 m)   Wt 94.8 kg (209 lb)   SpO2 99%   BMI 40.82 kg/m²       Physical Exam  Constitutional:       Appearance: Normal appearance.   HENT:      Head: Normocephalic and atraumatic.   Cardiovascular:      Rate and Rhythm: Normal rate and regular rhythm.      Pulses: Normal pulses.      Heart sounds: Normal heart sounds.   Musculoskeletal:        Legs:    Neurological:      General: No focal deficit present.      Mental Status: She is alert and oriented to person, place, and time.   Psychiatric:         Mood and Affect: Mood normal.         Behavior: Behavior normal.         Thought Content: Thought content normal.         Judgment: Judgment normal.

## 2025-03-23 LAB
BACTERIA UR CULT: NORMAL
Lab: NORMAL

## 2025-03-24 ENCOUNTER — TELEPHONE (OUTPATIENT)
Age: 55
End: 2025-03-24

## 2025-03-24 NOTE — TELEPHONE ENCOUNTER
Patient was seen last week and prescribed medication for an UTI.  She is calling to follow up on results of the urine culture done as she stated she finished the medication this morning, but is still having discomfort and it still takes her a minute before she's able to start urinating when trying to go.  Please advise next steps.  Thank you!

## 2025-03-24 NOTE — TELEPHONE ENCOUNTER
Patient calling back to f/u about test results. Patient aware that provider has not had a chance to review yet. Patient is concerned because she is still symptomatic and completed her course of antibiotics this morning. Please reach out to patient with provider response.     Patient has zoom class 6:30-7:30 so prefers not to be interrupted during that time if possible.

## 2025-03-25 ENCOUNTER — NURSE TRIAGE (OUTPATIENT)
Age: 55
End: 2025-03-25

## 2025-03-25 NOTE — TELEPHONE ENCOUNTER
Urine cx results reviewed and negative. If patient continues to have symptoms she should be seen for reevaluation. Maybe virtual if patient unable to come into office.

## 2025-03-25 NOTE — TELEPHONE ENCOUNTER
Pt is aware and understands. Says she is going to  today because she has not been getting her phone calls returned quickly enough. She is considering leaving the practice.  No further action required.

## 2025-03-25 NOTE — TELEPHONE ENCOUNTER
"Reason for Disposition  • Taking antibiotic > 72 hours (3 days) and symptoms (other than fever) not improved    Answer Assessment - Initial Assessment Questions  1. INFECTION: \"What infection is the antibiotic being given for?\"      UTI  2. ANTIBIOTIC: \"What antibiotic are you taking\" \"How many times per day?\"      Bactrim   3. DURATION: \"When was the antibiotic started?\"      5 days, finished yesterday   4. MAIN CONCERN OR SYMPTOM:  \"What is your main concern right now?\"      UTI symptoms persist after finished antibiotic  5. BETTER-SAME-WORSE: \"Are you getting better, staying the same, or getting worse compared to when you first started the antibiotics?\" If getting worse, ask: \"In what way?\"       Same   6. FEVER: \"Do you have a fever?\" If Yes, ask: \"What is your temperature, how was it measured, and when did it start?\"      No   7. SYMPTOMS: \"Are there any other symptoms you're concerned about?\" If Yes, ask: \"When did it start?\"      Frequency, Pain with urination.   8. FOLLOW-UP APPOINTMENT: \"Do you have a follow-up appointment with your doctor?\"      None    Protocols used: Infection on Antibiotic Follow-up Call-Adult-[place]    "

## 2025-03-25 NOTE — TELEPHONE ENCOUNTER
FOLLOW UP: ASAP    REASON FOR CONVERSATION: Urinary Symptoms and Urinary Tract Infection    SYMPTOMS: persisted UTI symptoms.     OTHER: Bactrim finished yesterday. Please Advise.     DISPOSITION: Callback by PCP Today

## 2025-04-18 ENCOUNTER — APPOINTMENT (OUTPATIENT)
Dept: LAB | Facility: CLINIC | Age: 55
End: 2025-04-18
Attending: NURSE PRACTITIONER
Payer: COMMERCIAL

## 2025-04-21 ENCOUNTER — RESULTS FOLLOW-UP (OUTPATIENT)
Dept: ENDOCRINOLOGY | Facility: CLINIC | Age: 55
End: 2025-04-21

## 2025-04-28 ENCOUNTER — OFFICE VISIT (OUTPATIENT)
Dept: ENDOCRINOLOGY | Facility: CLINIC | Age: 55
End: 2025-04-28
Payer: COMMERCIAL

## 2025-04-28 VITALS
HEART RATE: 83 BPM | WEIGHT: 206.8 LBS | HEIGHT: 61 IN | BODY MASS INDEX: 39.04 KG/M2 | OXYGEN SATURATION: 99 % | SYSTOLIC BLOOD PRESSURE: 127 MMHG | DIASTOLIC BLOOD PRESSURE: 87 MMHG

## 2025-04-28 DIAGNOSIS — E03.9 HYPOTHYROIDISM, UNSPECIFIED TYPE: ICD-10-CM

## 2025-04-28 DIAGNOSIS — E11.65 TYPE 2 DIABETES MELLITUS WITH HYPERGLYCEMIA, WITH LONG-TERM CURRENT USE OF INSULIN (HCC): ICD-10-CM

## 2025-04-28 DIAGNOSIS — E78.2 MIXED HYPERLIPIDEMIA: ICD-10-CM

## 2025-04-28 DIAGNOSIS — Z79.4 TYPE 2 DIABETES MELLITUS WITH HYPERGLYCEMIA, WITH LONG-TERM CURRENT USE OF INSULIN (HCC): ICD-10-CM

## 2025-04-28 DIAGNOSIS — Z96.41 INSULIN PUMP IN PLACE: ICD-10-CM

## 2025-04-28 DIAGNOSIS — E88.819 INSULIN RESISTANCE: ICD-10-CM

## 2025-04-28 DIAGNOSIS — E10.65 TYPE 1 DIABETES MELLITUS WITH HYPERGLYCEMIA (HCC): Primary | ICD-10-CM

## 2025-04-28 PROCEDURE — 99214 OFFICE O/P EST MOD 30 MIN: CPT | Performed by: NURSE PRACTITIONER

## 2025-04-28 PROCEDURE — 95251 CONT GLUC MNTR ANALYSIS I&R: CPT | Performed by: NURSE PRACTITIONER

## 2025-04-28 RX ORDER — TIRZEPATIDE 2.5 MG/.5ML
2.5 INJECTION, SOLUTION SUBCUTANEOUS WEEKLY
Qty: 2 ML | Refills: 0 | Status: SHIPPED | OUTPATIENT
Start: 2025-04-28

## 2025-04-28 NOTE — ASSESSMENT & PLAN NOTE
Triglyceride levels elevated. LDL at goal from April 2025 on statin. No change to statin therapy.

## 2025-04-28 NOTE — ASSESSMENT & PLAN NOTE
Insulin resistance with additional insulin administered via insulin injection in addition to insulin pump, is using up to 1.2 u/kg/day of insulin.   Orders:    Tirzepatide (Mounjaro) 2.5 MG/0.5ML SOAJ; Inject 2.5 mg under the skin once a week

## 2025-04-28 NOTE — PROGRESS NOTES
Name: Layla Chaves      : 1970      MRN: [id number]  Encounter Provider: FAIZA Andrade  Encounter Date: 2025   Encounter department: [facility] [place]    Assessment & Plan  Type 1 diabetes mellitus with hyperglycemia (HCC)    Lab Results   Component Value Date    HGBA1C 7.1 (H) 2025     HGA1C close to goal but continuing to require high doses of insulin and often requiring additional insulin to be administered outside of the pump. Given type 2/insulin resistance features, recommend GLP-1/GIP.     Will start Mounjaro 2.5 mg weekly and continue current insulin pump settings.     Discussed GLP-1 RA options.  Denies personal history of pancreatitis, family/personal history of thyroid c-cell tumors including medullary thyroid cancer, or family/personal history of multiple endocrine neoplasias. Reviewed mechanism of action. Patient is aware of possible side effects which include nausea, vomiting, diarrhea, constipation, bloating, upset stomach. The patient is aware that blood sugar logs/CGM download must be submitted for review before dose adjustment and reporting any side effects of the medication which may include appetite suppression, constipation, diarrhea, nausea, abdominal pain, fatigue/malaise. Counseled the patient on the importance of maintaining a healthy diet in addition to regular physical activity while on this medication.      Discussed risks/complications associated with uncontrolled diabetes including organ involvement, heart attack, stroke, death.    Advised lifestyle modifications including attention to diet including the amount and types of carbohydrates consumed and regular activity.     Call for blood sugars less than 70 mg/dl or patterns over 250 mg/dl.       Discussed symptoms and treatment of hypoglycemia.  Reviewed risks associated with hypoglycemia. Always carry rapid acting carbohydrates and a glucometer (a way to check your blood  sugar).    Recommendation for medical identification either brapolaet, necklace.    Recommendation for glucagon if on insulin.     Routine follow up for diabetic eye and foot exams.     Ordered blood work to complete prior to next visit.    Send glucose logs/CGM download in 1-2 weeks for review    Follow up in 3 months.     Orders:    Tirzepatide (Mounjaro) 2.5 MG/0.5ML SOAJ; Inject 2.5 mg under the skin once a week    Hemoglobin A1C; Future    Comprehensive metabolic panel; Future    Lipid panel; Future    Albumin / creatinine urine ratio; Future    Insulin resistance  Insulin resistance with additional insulin administered via insulin injection in addition to insulin pump, is using up to 1.2 u/kg/day of insulin.   Orders:    Tirzepatide (Mounjaro) 2.5 MG/0.5ML SOAJ; Inject 2.5 mg under the skin once a week    Hypothyroidism, unspecified type  Clinically euthyroid. TSH and free T4 at goal from April 2025 on levothyroxine 137 mcg daily. Continue current dose for now.   Orders:    TSH, 3rd generation; Future    T4, free; Future    Type 2 diabetes mellitus with hyperglycemia, with long-term current use of insulin ([facility])    Lab Results   Component Value Date    HGBA1C 7.1 (H) 04/18/2025     Insulin resistance with additional insulin administered via insulin injection in addition to insulin pump, is using up to 1.2 u/kg/day of insulin.   Orders:    Tirzepatide (Mounjaro) 2.5 MG/0.5ML SOAJ; Inject 2.5 mg under the skin once a week    Insulin pump in place  Continues tandem insulin pump not integrated with CGM due to patient preference.       Mixed hyperlipidemia  Triglyceride levels elevated. LDL at goal from April 2025 on statin. No change to statin therapy.            History of Present Illness     Layla Chaves is a 55 y.o. female  who presents with a history of type 1 diabetes with long term use of insulin for approximately 10 years which has been uncomplicated.     Regular diabetic eye and foot exams. Denies DR-- no  vision.  Denies neuropathy-- denies numbness or tingling.      Denies recent illness or hospitalizations.      Denies recent severe hypoglycemic or severe hyperglycemic episodes requiring medical attention.      Home glucose monitoring: are performed regularly with CGM.      TDD on pump  unitsdaily  30-50 units in addiiton injected      CGM Interpretation  Layla Silveriol   Device used Dexcom for Personal Use  Indication: Type of Diabetes: Type 1 Diabetes  More than 72 hours of data was reviewed. Report to be scanned to chart.   Date Range: April 15-28, 2025  Analysis of data:   Average Glucose: 169 mg/dl  Coefficient of Variation: 35%  SD : 59 mg/dl  GMI: 7.4%  Time in Target Range: 67%  Time Above Range: 33%  Time Below Range: 0%   Interpretation of data:   Post-prandial hyperglycemia        Patient is on a Tandem pump prescribed by endocrinology. She has been on a pump for many years.   She denies any malfunctioning of the pump.  Current Problems with Pump: Did not prefer integration. Having difficulty with leaking pump sites despite discussion with tech support.     Current Insulin pump settings:  Basal rate: (MN) 1.3 (3A) 1.9 (8A) 1.85 (5P) 1.8   Insulin sensitivity factor: (MN) 30 (3A) 20 (8P) 30  Insulin to carb ratio:(MN) 5 (3A) 3 (8A) 4.0 (5P) 4 (8P) 5  BG target: 120  Active Insulin time: 4 hr        Backup Plan: Would take 40 units long acting insulin, will check expiration date on insulin pen.   Aware that in case of malfunctioning of the pump or unexplained hyperglycemia to use basal and bolus therapy as backup which is prescribed to the patient. Also notified patient to call clinic and/or pump company if any issues or go to emergency department if signs/symptoms of DKA.      Has baqsimi in case of severe hypoglycemia.      On Statin.      Has hypothyroidism: Taking levothyroxine 137 mcg daily, regularly and properly. Denies symptoms consistent with hypo/hyperthyroidism.       Review of Systems as  "per HPI  Medical History Reviewed by provider this encounter:  Allergies  Problems     .  Current Outpatient Medications on File Prior to Visit   Medication Sig Dispense Refill    Accu-Chek FastClix Lancets MISC CHECK BLOOD SUGAR 4 TIMES DAILY 408 each 1    albuterol (Proventil HFA) 90 mcg/act inhaler Inhale 2 puffs every 6 (six) hours as needed for wheezing 6.7 g 5    atorvastatin (LIPITOR) 20 mg tablet TAKE 1 TABLET BY MOUTH ONCE  DAILY 90 tablet 1    Baqsimi Two Pack 3 MG/DOSE POWD INSERT DEVICE TIP INTO 1 NOSTRIL PUSH DEVICE PLUNGER UNTIL GREEN  LINE DISAPPEARS AS NEEDED FOR  HYPOGLYCEMIA 2 each 3    Cholecalciferol (VITAMIN D3 PO) Take 1,000 Int'l Units by mouth      Continuous Blood Gluc  (Dexcom G7 ) SAIMA Use 1 Device continuous 1 each 0    Contour Next Test test strip USE TO TEST BLOOD SUGAR 5 TIMES  DAILY 500 strip 3    EPINEPHrine (EPIPEN) 0.3 mg/0.3 mL SOAJ INJECT THE CONTENTS OF 1 PEN  INTRAMUSCULARLY AS NEEDED FOR  ALLERGIC RESPONSE AS DIRECTED BY MD. SEEK MEDICAL HELP AFTER USE 6 each 0    HumaLOG 100 UNIT/ML injection INJECT SUBCUTANEOUSLY UP   UNITS DAILY VIA INSULIN PUMP 90 mL 1    levothyroxine (Synthroid) 137 mcg tablet Take 1 tablet (137 mcg total) by mouth daily 90 tablet 2    Multiple Vitamins-Minerals (CENTRUM SILVER 50+WOMEN PO) Take by mouth      norethindrone-ethinyl estradiol (Dasetta 7/7/7) 0.5/0.75/1-35 MG-MCG per tablet TAKE 1 TABLET BY MOUTH DAILY 84 tablet 1    SUMAtriptan (IMITREX) 100 mg tablet TAKE 1 TAB FOR MIGRAINE HEADACHE may repeat in 2 hours if necessary. Max dose 200mg in 24 hour period. 10 tablet 5     No current facility-administered medications on file prior to visit.         Medical History Reviewed by provider this encounter:     .    Objective   /87 (BP Location: Right arm, Patient Position: Sitting, Cuff Size: Large)   Pulse 83   Ht 5' 1\" (1.549 m)   Wt 93.8 kg (206 lb 12.8 oz)   SpO2 99%   BMI 39.07 kg/m²      Body mass index is " 39.07 kg/m².  Wt Readings from Last 3 Encounters:   04/28/25 93.8 kg (206 lb 12.8 oz)   03/19/25 94.8 kg (209 lb)   01/22/25 93.9 kg (207 lb)        Physical Exam  Vitals reviewed.   Constitutional:       Appearance: Normal appearance.   Cardiovascular:      Rate and Rhythm: Normal rate   Pulmonary:      Effort: Pulmonary effort is normal.   Skin:     General: Skin is warm and dry.   Neurological:      General: No focal deficit present.      Mental Status: She is alert and oriented to person, place, and time.   Psychiatric:         Mood and Affect: Mood normal.         Behavior: Behavior normal.       Labs:   Lab Results   Component Value Date    HGBA1C 7.1 (H) 04/18/2025    HGBA1C 7.4 (H) 01/11/2025    HGBA1C 6.8 (A) 12/11/2024     Lab Results   Component Value Date    CREATININE 0.60 04/18/2025    CREATININE 0.74 01/11/2025    CREATININE 0.73 09/14/2024    BUN 11 04/18/2025     05/22/2016    K 4.9 04/18/2025    CL 99 04/18/2025    CO2 25 04/18/2025     eGFR   Date Value Ref Range Status   04/18/2025 101 ml/min/1.73sq m Final     Lab Results   Component Value Date    CHOL 178 04/01/2017    HDL 63 04/18/2025    TRIG 256 (H) 04/18/2025    CHOLHDL 2.7 01/11/2025     Lab Results   Component Value Date    ALT 14 04/18/2025    AST 16 04/18/2025    ALKPHOS 89 04/18/2025    BILITOT 0.2 05/22/2016     Lab Results   Component Value Date    NQH2STFOHEFO 4.144 04/18/2025    SRF0OXHLOGQK 1.400 05/22/2016     Lab Results   Component Value Date    FREET4 1.04 04/18/2025       Discussed with the patient and all questioned fully answered. She will call me if any problems arise.

## 2025-04-28 NOTE — ASSESSMENT & PLAN NOTE
Clinically euthyroid. TSH and free T4 at goal from April 2025 on levothyroxine 137 mcg daily. Continue current dose for now.   Orders:    TSH, 3rd generation; Future    T4, free; Future

## 2025-04-28 NOTE — ASSESSMENT & PLAN NOTE
Lab Results   Component Value Date    HGBA1C 7.1 (H) 04/18/2025     HGA1C close to goal but continuing to require high doses of insulin and often requiring additional insulin to be administered outside of the pump. Given type 2/insulin resistance features, recommend GLP-1/GIP.     Will start Mounjaro 2.5 mg weekly and continue current insulin pump settings.     Discussed GLP-1 RA options.  Denies personal history of pancreatitis, family/personal history of thyroid c-cell tumors including medullary thyroid cancer, or family/personal history of multiple endocrine neoplasias. Reviewed mechanism of action. Patient is aware of possible side effects which include nausea, vomiting, diarrhea, constipation, bloating, upset stomach. The patient is aware that blood sugar logs/CGM download must be submitted for review before dose adjustment and reporting any side effects of the medication which may include appetite suppression, constipation, diarrhea, nausea, abdominal pain, fatigue/malaise. Counseled the patient on the importance of maintaining a healthy diet in addition to regular physical activity while on this medication.      Discussed risks/complications associated with uncontrolled diabetes including organ involvement, heart attack, stroke, death.    Advised lifestyle modifications including attention to diet including the amount and types of carbohydrates consumed and regular activity.     Call for blood sugars less than 70 mg/dl or patterns over 250 mg/dl.       Discussed symptoms and treatment of hypoglycemia.  Reviewed risks associated with hypoglycemia. Always carry rapid acting carbohydrates and a glucometer (a way to check your blood sugar).    Recommendation for medical identification either bracelet, necklace.    Recommendation for glucagon if on insulin.     Routine follow up for diabetic eye and foot exams.     Ordered blood work to complete prior to next visit.    Send glucose logs/CGM download in 1-2 weeks  for review    Follow up in 3 months.     Orders:    Tirzepatide (Mounjaro) 2.5 MG/0.5ML SOAJ; Inject 2.5 mg under the skin once a week    Hemoglobin A1C; Future    Comprehensive metabolic panel; Future    Lipid panel; Future    Albumin / creatinine urine ratio; Future

## 2025-04-29 ENCOUNTER — TELEPHONE (OUTPATIENT)
Age: 55
End: 2025-04-29

## 2025-04-29 NOTE — TELEPHONE ENCOUNTER
"Patient calling in regard to mounjaro    States \"I got a call that the prior authorization was denied. If you can do an appeal I would like that to be sent.\"    Please advise patient with update when available.  "

## 2025-04-29 NOTE — TELEPHONE ENCOUNTER
PA for Mounjaro 2.5mg SUBMITTED to [facility]    via    []CMM-KEY:   [x]Surescripts-Case ID # [id number]   []Availity-Auth ID # NDC #   []Faxed to plan   []Other website   []Phone call Case ID #     [x]PA sent as URGENT    All office notes, labs and other pertaining documents and studies sent. Clinical questions answered. Awaiting determination from insurance company.     Turnaround time for your insurance to make a decision on your Prior Authorization can take 7-21 business days.

## 2025-05-01 ENCOUNTER — TELEPHONE (OUTPATIENT)
Dept: ENDOCRINOLOGY | Facility: CLINIC | Age: 55
End: 2025-05-01

## 2025-05-05 ENCOUNTER — TELEPHONE (OUTPATIENT)
Age: 55
End: 2025-05-05

## 2025-05-05 DIAGNOSIS — E10.65 TYPE 1 DIABETES MELLITUS WITH HYPERGLYCEMIA (HCC): ICD-10-CM

## 2025-05-05 RX ORDER — INSULIN LISPRO 100 [IU]/ML
INJECTION, SOLUTION INTRAVENOUS; SUBCUTANEOUS
Qty: 140 ML | Refills: 1 | Status: SHIPPED | OUTPATIENT
Start: 2025-05-05

## 2025-05-05 NOTE — TELEPHONE ENCOUNTER
Please review the following medication for refill. Patient stated that the provider was going to up her units to 150. Thank you

## 2025-05-05 NOTE — TELEPHONE ENCOUNTER
Patient is calling stating provider did not call her insulin in to [facility].  Patient is requesting a refill on her Humalog stating the  provider said she was going to be upping it to 150 units from 100 units.  Please send it to [facility].

## 2025-05-05 NOTE — TELEPHONE ENCOUNTER
Patient was calling to see if we heard anything about her prior authorization for her Mounjaro prescription.

## 2025-05-07 NOTE — TELEPHONE ENCOUNTER
We have not received the Denial letter for the above patient would you like the prior Authorization team to submit an appeal. Thank you

## 2025-05-07 NOTE — TELEPHONE ENCOUNTER
Duplicate encounter created, please see telephone encounter from 4/29/25 regarding Mounjaro PA status. Please review patient's chart to see if there is already an encounter regarding the medication in question and to document anything regarding this medication in regards to anything regarding the authorization process etc before creating another encounter Thank You.

## 2025-05-07 NOTE — TELEPHONE ENCOUNTER
We can do this, as needed, after receiving the denial. I need to see why the medication is being denied. Thank you!

## 2025-05-12 ENCOUNTER — TELEPHONE (OUTPATIENT)
Dept: ENDOCRINOLOGY | Facility: CLINIC | Age: 55
End: 2025-05-12

## 2025-05-29 DIAGNOSIS — Z00.00 ROUTINE GENERAL MEDICAL EXAMINATION AT A HEALTH CARE FACILITY: Primary | ICD-10-CM

## 2025-05-29 DIAGNOSIS — E78.2 MIXED HYPERLIPIDEMIA: ICD-10-CM

## 2025-05-29 DIAGNOSIS — E03.9 HYPOTHYROIDISM, UNSPECIFIED TYPE: ICD-10-CM

## 2025-05-29 DIAGNOSIS — E03.4 HYPOTHYROIDISM DUE TO ACQUIRED ATROPHY OF THYROID: ICD-10-CM

## 2025-05-29 DIAGNOSIS — K21.00 GASTROESOPHAGEAL REFLUX DISEASE WITH ESOPHAGITIS WITHOUT HEMORRHAGE: ICD-10-CM

## 2025-06-02 ENCOUNTER — TELEPHONE (OUTPATIENT)
Age: 55
End: 2025-06-02

## 2025-06-02 DIAGNOSIS — Z30.41 ORAL CONTRACEPTIVE PILL SURVEILLANCE: ICD-10-CM

## 2025-06-02 NOTE — TELEPHONE ENCOUNTER
Patient  called she  still waiting see if she was  approved for  Mounjaro. She would  like a phone call  back  today with a update.

## 2025-06-03 NOTE — TELEPHONE ENCOUNTER
Please review patient Danitza was dined and Monica Starkey was requesting for us to submit the appeal. Please advise for the appeal can be submitted. Thank you

## 2025-06-04 RX ORDER — NORETHINDRONE AND ETHINYL ESTRADIOL 7 DAYS X 3
1 KIT ORAL DAILY
Qty: 84 TABLET | Refills: 3 | Status: SHIPPED | OUTPATIENT
Start: 2025-06-04

## 2025-06-04 NOTE — TELEPHONE ENCOUNTER
Requested medication(s) are due for refill today: Yes  Patient has already received a courtesy refill: No  Other reason request has been forwarded to provider: The requested medication is not on the active medication list.

## 2025-06-05 NOTE — TELEPHONE ENCOUNTER
PA for Mounjaro 2.5mg APPEALED via     []CMM  []SS  [x]Letter sent to insurance via fax [phone]  []Other site or means     All necessary records sent. Will await response from insurance company    Turnaround time for a decision to be made on an appeal could take up to 30 business days

## 2025-06-05 NOTE — TELEPHONE ENCOUNTER
Good Morning, Yes it was sent to the prior auth team but they need a reason to submit the appeal.  Thank you

## 2025-06-05 NOTE — TELEPHONE ENCOUNTER
Please submit for an appeal for the reason     The reason is poorly controlled diabetes with significant insulin resistance:     Insulin resistance with additional insulin administered via insulin injection in addition to insulin pump, is using up to 1.2 u/kg/day of insulin.

## 2025-06-08 DIAGNOSIS — E78.2 MIXED HYPERLIPIDEMIA: ICD-10-CM

## 2025-06-09 RX ORDER — ATORVASTATIN CALCIUM 20 MG/1
20 TABLET, FILM COATED ORAL DAILY
Qty: 90 TABLET | Refills: 1 | Status: SHIPPED | OUTPATIENT
Start: 2025-06-09

## 2025-06-17 ENCOUNTER — TELEPHONE (OUTPATIENT)
Age: 55
End: 2025-06-17

## 2025-06-17 RX ORDER — TIRZEPATIDE 2.5 MG/.5ML
2.5 INJECTION, SOLUTION SUBCUTANEOUS WEEKLY
Qty: 2 ML | Refills: 0 | Status: SHIPPED | OUTPATIENT
Start: 2025-06-17 | End: 2025-06-20 | Stop reason: SDUPTHER

## 2025-06-17 NOTE — TELEPHONE ENCOUNTER
PA for Zepbound 2.5mg APPROVED     Date(s) approved 06/17/2025-12/17/2025        Patient advised by          []MyChart Message  []Phone call   []LMOM  [x]L/M to call office as no active Communication consent on file  []Unable to leave detailed message as VM not approved on Communication consent       Pharmacy advised by    [x]Fax  []Phone call  []Secure Chat    Specialty Pharmacy    []     Approval letter scanned into Media Yes

## 2025-06-17 NOTE — TELEPHONE ENCOUNTER
PA for Zepbound 2.5mg SUBMITTED to [facility]    via    []CMM-KEY:   [x]Surescripts-Case ID #     [id number]     []Availity-Auth ID # NDC #   []Faxed to plan   []Other website   []Phone call Case ID #     [x]PA sent as URGENT    All office notes, labs and other pertaining documents and studies sent. Clinical questions answered. Awaiting determination from insurance company.     Turnaround time for your insurance to make a decision on your Prior Authorization can take 7-21 business days.

## 2025-06-20 RX ORDER — TIRZEPATIDE 2.5 MG/.5ML
2.5 INJECTION, SOLUTION SUBCUTANEOUS WEEKLY
Qty: 2 ML | Refills: 0 | Status: SHIPPED | OUTPATIENT
Start: 2025-06-20 | End: 2025-07-18

## 2025-06-23 ENCOUNTER — TELEPHONE (OUTPATIENT)
Dept: ENDOCRINOLOGY | Facility: CLINIC | Age: 55
End: 2025-06-23

## 2025-06-23 NOTE — PROGRESS NOTES
"Infusion Set  Infusion Set, TruSteel, 6mm cannula, 32\" tubing, Change every 1.5 - 2 days, Set (10) - INFS    Quantity  1    Prescription details  Day Supply - 90    Supplier  [employer] ([employer])    " Yes

## 2025-06-23 NOTE — TELEPHONE ENCOUNTER
Patient returned call that she received today. I did not locate any documentation but she said she spoke with [facility] and they are getting her prescription ready for her and she will call if any issues arise.

## 2025-06-23 NOTE — TELEPHONE ENCOUNTER
I called and spoke with the patient and she stated that this has 6 months that she has been having the leakage and Tandem Rep Bryan told her that the Infusion set for the Trusteel should be getting change every 2 days not 3 days and every 2 days is when the patient is having the leakage.  She stated that she really do not need to she the Diabetes Educator because she know that the correct insertion site. But if you need her to go she'll go. She was just misinform on the days she needed to change her Infusion set. Thank you

## 2025-06-23 NOTE — TELEPHONE ENCOUNTER
"Infusion Set  Infusion Set, TruSteel, 6mm cannula, 32\" tubing, Change every 1.5 - 2 days, Set (10) - INFS    Quantity  1    Prescription details  Day Supply - 90    Supplier  [facility] ([employer])    "

## 2025-06-23 NOTE — TELEPHONE ENCOUNTER
Called office  patient  want to speak to  wyatt reynolds to get a team member please call patient back.

## 2025-06-23 NOTE — TELEPHONE ENCOUNTER
Patient called stating that her infusion set keeps leaking on her Tandem. She is asking for us to send in for infusion set to [facility]. [facility] told the patient that provider would need to send in a prescription for more than 5 quantity. Patient is changing it everyday to every other day. She has call tandem and they have send her a supply but she stated that it is leaking. She is not sure what is going go. Please advise thank you

## 2025-06-23 NOTE — TELEPHONE ENCOUNTER
I called and left a voicemail for the patient stating Monica's patient   Since when has patient been experiencing leakage from the infusion set?  I would recommend a follow-up with the diabetes educator so that correct insertion etc. can be evaluated

## 2025-06-23 NOTE — TELEPHONE ENCOUNTER
"                                                                 [facility]     Office Visit       Date: 08/01/2024   Patient Name: Radha Medrano  MRN: [id number]  YOB: 1993    Referring Physician: Jad Alexander PA-C     Chief Complaint:   Chief Complaint   Patient presents with    Right Wrist - Pain, Initial Evaluation     History of Present Illness:   Radha Medrano is a 31 y.o. female right-hand-dominant presented to clinic as a new patient with complaints of right wrist pain.  Date of injury was 4/27/2024 while playing flag football.  Initial x-rays were negative for acute fractures.  She developed persistent pain and discomfort and an MRI of the wrist was ordered that demonstrated a nondisplaced scaphoid waist fracture.  She was then immobilized in a thumb spica cast on 6/4/2024.  Cast removal was on 7/24/2024.  She now reports diffuse pain and discomfort to the right thumb wrist and hand.  She was transition into a removable brace but has had difficulty secondary to pain.  There has been no reinjury.  There has been some numbness and tingling into the thumb and occasionally the index finger.  No other complaints or concerns.    Subjective   Review of Systems:   Review of Systems   Pertinent review of systems per HPI    I reviewed the patient's chief complaint, history of present illness, review of systems, past medical history, surgical history, family history, social history, medications and allergy list in the EMR on 08/01/2024 and agree with the findings above.    Objective    Vital Signs:   Vitals:    08/01/24 0845   BP: 118/84   Weight: 48.9 kg (107 lb 12.8 oz)   Height: 157.7 cm (62.09\")     BMI: BMI is within normal parameters. No other follow-up for BMI required.     General: No acute distress. Alert and oriented.   Cardiovascular: Palpable radial pulse.   Respiratory: Breathing is nonlabored.   Ortho Exam:  Examination of the right upper extremity demonstrates " Monica's patient   Since when has patient been experiencing leakage from the infusion set?  I would recommend a follow-up with the diabetes educator so that correct insertion etc. can be evaluated       diffuse skin dryness secondary to thumb spica cast wear.  No skin breakdown or abrasions.  No atrophy or wasting.  She is diffusely tender on examination today to the thumb CMC joint, MCP joint, IP joint, first center compartment, scaphoid tubercle, and anatomic snuffbox.  Nontender dorsally over the radiocarpal joint and SL interval.  Nontender to the distal ulna, ulnar styloid, ulnar fovea.  Thumb range of motion is decreased secondary to pain.  Sensation is intact to light touch throughout the hand.  Warm well-perfused distally.    Imaging / Studies:    Imaging Results (Last 24 Hours)       ** No results found for the last 24 hours. **        Right wrist x-rays obtained on 7/24/2024 were personally reviewed interpreted by myself.  These demonstrate no definitive fracture line within the scaphoid.  Sclerotic area noted within the head of the ulna.    Right wrist x-rays obtained on 7/3/2024 were personally reviewed inter by myself.  These do demonstrate a subtle lucency within the scaphoid waist.    Right wrist MRI obtained on 5/31/2024 was personally reviewed and interpreted by myself.  These demonstrate edema throughout the scaphoid with a fracture line noted at the waist.  Appears nondisplaced and transverse in orientation.    Assessment / Plan    Assessment/Plan:   Radha Medrano is a 31 y.o. female with right scaphoid waist fracture, DOI 4/27/2024, with residual wrist/digital stiffness.    I discussed with the patient their clinical and radiographic findings demonstrate a healed right scaphoid waist fracture with residual wrist and digital stiffness.  We had a lengthy discussion regarding the pathophysiology of their diagnosis.  I have reviewed the patient's prior imaging and notes, and based on her last x-ray there does appear to be evidence of bony healing.  However I would like to obtain a CT scan of the right wrist to evaluate the full extent of fracture healing.  This was ordered today.  Her exam  demonstrates diffuse tenderness, which is likely due to lying and stiffness as a result of cast wear.  I would like her to begin formal hand therapy.  She may begin to wean out of the splint as she tolerates.  A new wrist splint was provided today and she may discontinue the thumb spica splint.  I also provided her a Medrol Dosepak.  I will see her back with CT scan results.  They were agreeable with the plan.  All questions and concerns were addressed.       ICD-10-CM ICD-9-CM   1. Closed nondisplaced fracture of middle third of scaphoid bone of right wrist, sequela  S62.024S 905.2     Follow Up:   Return for Follow Up- After Testing.      Gemini Reeves MD  [facility]

## 2025-06-24 ENCOUNTER — TELEPHONE (OUTPATIENT)
Age: 55
End: 2025-06-24

## 2025-06-24 DIAGNOSIS — E10.65 TYPE 1 DIABETES MELLITUS WITH HYPERGLYCEMIA (HCC): Primary | ICD-10-CM

## 2025-06-24 NOTE — TELEPHONE ENCOUNTER
Yes,the TruSteel infusion set should be changed every 2 days however I do not think it is normal for her to leak every time.

## 2025-06-24 NOTE — TELEPHONE ENCOUNTER
Could we please ask Dr Tello to  put in a referral   for patient  for Diabetes education. She needs to be schedule  for training  with kenneth July 24 ,25  around  8:45.

## 2025-06-24 NOTE — TELEPHONE ENCOUNTER
I called and left a voicemail for the patient stating Yes,the TruSteel infusion set should be changed every 2 days however I do not think it is normal for her to leak every time.

## 2025-06-24 NOTE — TELEPHONE ENCOUNTER
If patient calls back  left   voice message  cancel her  diabetes   education .   needs  to  put  in a  referral.

## 2025-06-26 LAB
DME PARACHUTE DELIVERY DATE ACTUAL: NORMAL
DME PARACHUTE DELIVERY DATE REQUESTED: NORMAL
DME PARACHUTE ITEM DESCRIPTION: NORMAL
DME PARACHUTE ORDER STATUS: NORMAL
DME PARACHUTE SUPPLIER NAME: NORMAL
DME PARACHUTE SUPPLIER PHONE: NORMAL

## 2025-06-27 ENCOUNTER — APPOINTMENT (OUTPATIENT)
Dept: LAB | Facility: CLINIC | Age: 55
End: 2025-06-27
Payer: COMMERCIAL

## 2025-06-27 DIAGNOSIS — E78.2 MIXED HYPERLIPIDEMIA: ICD-10-CM

## 2025-06-27 DIAGNOSIS — K21.00 GASTROESOPHAGEAL REFLUX DISEASE WITH ESOPHAGITIS WITHOUT HEMORRHAGE: ICD-10-CM

## 2025-06-27 DIAGNOSIS — E03.4 HYPOTHYROIDISM DUE TO ACQUIRED ATROPHY OF THYROID: ICD-10-CM

## 2025-06-27 DIAGNOSIS — E03.9 HYPOTHYROIDISM, UNSPECIFIED TYPE: ICD-10-CM

## 2025-06-27 DIAGNOSIS — Z00.00 ROUTINE GENERAL MEDICAL EXAMINATION AT A HEALTH CARE FACILITY: ICD-10-CM

## 2025-06-27 LAB
ALBUMIN SERPL BCG-MCNC: 4.3 G/DL (ref 3.5–5)
ALP SERPL-CCNC: 91 U/L (ref 34–104)
ALT SERPL W P-5'-P-CCNC: 14 U/L (ref 7–52)
ANION GAP SERPL CALCULATED.3IONS-SCNC: 11 MMOL/L (ref 4–13)
AST SERPL W P-5'-P-CCNC: 18 U/L (ref 13–39)
BASOPHILS # BLD AUTO: 0.1 THOUSANDS/ÂΜL (ref 0–0.1)
BASOPHILS NFR BLD AUTO: 1 % (ref 0–1)
BILIRUB SERPL-MCNC: 0.36 MG/DL (ref 0.2–1)
BUN SERPL-MCNC: 12 MG/DL (ref 5–25)
CALCIUM SERPL-MCNC: 9 MG/DL (ref 8.4–10.2)
CHLORIDE SERPL-SCNC: 100 MMOL/L (ref 96–108)
CHOLEST SERPL-MCNC: 164 MG/DL (ref ?–200)
CO2 SERPL-SCNC: 23 MMOL/L (ref 21–32)
CREAT SERPL-MCNC: 0.65 MG/DL (ref 0.6–1.3)
EOSINOPHIL # BLD AUTO: 0.31 THOUSAND/ÂΜL (ref 0–0.61)
EOSINOPHIL NFR BLD AUTO: 2 % (ref 0–6)
ERYTHROCYTE [DISTWIDTH] IN BLOOD BY AUTOMATED COUNT: 12.9 % (ref 11.6–15.1)
GFR SERPL CREATININE-BSD FRML MDRD: 98 ML/MIN/1.73SQ M
GLUCOSE P FAST SERPL-MCNC: 259 MG/DL (ref 65–99)
HCT VFR BLD AUTO: 40.7 % (ref 34.8–46.1)
HDLC SERPL-MCNC: 59 MG/DL
HGB BLD-MCNC: 12.7 G/DL (ref 11.5–15.4)
IMM GRANULOCYTES # BLD AUTO: 0.12 THOUSAND/UL (ref 0–0.2)
IMM GRANULOCYTES NFR BLD AUTO: 1 % (ref 0–2)
LDLC SERPL CALC-MCNC: 58 MG/DL (ref 0–100)
LYMPHOCYTES # BLD AUTO: 3.14 THOUSANDS/ÂΜL (ref 0.6–4.47)
LYMPHOCYTES NFR BLD AUTO: 20 % (ref 14–44)
MCH RBC QN AUTO: 29.7 PG (ref 26.8–34.3)
MCHC RBC AUTO-ENTMCNC: 31.2 G/DL (ref 31.4–37.4)
MCV RBC AUTO: 95 FL (ref 82–98)
MONOCYTES # BLD AUTO: 0.76 THOUSAND/ÂΜL (ref 0.17–1.22)
MONOCYTES NFR BLD AUTO: 5 % (ref 4–12)
NEUTROPHILS # BLD AUTO: 11.08 THOUSANDS/ÂΜL (ref 1.85–7.62)
NEUTS SEG NFR BLD AUTO: 71 % (ref 43–75)
NONHDLC SERPL-MCNC: 105 MG/DL
NRBC BLD AUTO-RTO: 0 /100 WBCS
PLATELET # BLD AUTO: 412 THOUSANDS/UL (ref 149–390)
PMV BLD AUTO: 11.6 FL (ref 8.9–12.7)
POTASSIUM SERPL-SCNC: 4.8 MMOL/L (ref 3.5–5.3)
PROT SERPL-MCNC: 7.6 G/DL (ref 6.4–8.4)
RBC # BLD AUTO: 4.27 MILLION/UL (ref 3.81–5.12)
SODIUM SERPL-SCNC: 134 MMOL/L (ref 135–147)
T4 FREE SERPL-MCNC: 1.01 NG/DL (ref 0.61–1.12)
TRIGL SERPL-MCNC: 236 MG/DL (ref ?–150)
TSH SERPL DL<=0.05 MIU/L-ACNC: 7.03 UIU/ML (ref 0.45–4.5)
WBC # BLD AUTO: 15.51 THOUSAND/UL (ref 4.31–10.16)

## 2025-06-27 PROCEDURE — 85025 COMPLETE CBC W/AUTO DIFF WBC: CPT

## 2025-06-27 PROCEDURE — 84439 ASSAY OF FREE THYROXINE: CPT

## 2025-06-27 PROCEDURE — 80053 COMPREHEN METABOLIC PANEL: CPT

## 2025-06-27 PROCEDURE — 84443 ASSAY THYROID STIM HORMONE: CPT

## 2025-06-27 PROCEDURE — 36415 COLL VENOUS BLD VENIPUNCTURE: CPT

## 2025-06-27 PROCEDURE — 80061 LIPID PANEL: CPT

## 2025-07-08 PROBLEM — E66.09 OBESITY DUE TO EXCESS CALORIES WITH SERIOUS COMORBIDITY: Status: RESOLVED | Noted: 2021-03-05 | Resolved: 2025-07-08

## 2025-07-08 PROBLEM — D72.829 LEUKOCYTOSIS: Status: RESOLVED | Noted: 2019-12-02 | Resolved: 2025-07-08

## 2025-07-08 PROBLEM — M79.671 PAIN IN BOTH FEET: Status: RESOLVED | Noted: 2019-09-26 | Resolved: 2025-07-08

## 2025-07-08 PROBLEM — M79.672 PAIN IN BOTH FEET: Status: RESOLVED | Noted: 2019-09-26 | Resolved: 2025-07-08

## 2025-07-08 NOTE — PATIENT INSTRUCTIONS
".DISCUSSED HEALTH ISSUES  HEALTHY DIET AND EXERCISE  BW WILL BE reviewed  MAMMOGRAPHY   RECOMMEND CALCIUM [id number] MG DAILY  VITAMIN D3  1000 IU DAILY  RV IN 1 YEAR FOR ANNUAL EXAM, SOONER IF NEEDED      RV 6M    Recent Results (from the past 4 weeks)   Infusion Set    Collection Time: 06/23/25  1:30 PM   Result Value Ref Range    Supplier Name [employer] ([employer] CGM Supplier)     Supplier Phone Number [phone]     Order Status Third Party Delivery     Delivery Note      Delivery Request Date 06/27/2025     Date Delivered  06/26/2025     Item Description       Infusion Set, [employer], 6mm cannula, 32\" tubing, Change every 1.5 - 2 days, Set (10)   T4, free    Collection Time: 06/27/25  7:11 AM   Result Value Ref Range    Free T4 1.01 0.61 - 1.12 ng/dL   TSH, 3rd generation    Collection Time: 06/27/25  7:11 AM   Result Value Ref Range    TSH 3RD GENERATION 7.028 (H) 0.450 - 4.500 uIU/mL   Lipid panel    Collection Time: 06/27/25  7:11 AM   Result Value Ref Range    Cholesterol 164 See Comment mg/dL    Triglycerides 236 (H) See Comment mg/dL    HDL, Direct 59 >=50 mg/dL    LDL Calculated 58 0 - 100 mg/dL    Non-HDL-Chol (CHOL-HDL) 105 mg/dl   Comprehensive metabolic panel    Collection Time: 06/27/25  7:11 AM   Result Value Ref Range    Sodium 134 (L) 135 - 147 mmol/L    Potassium 4.8 3.5 - 5.3 mmol/L    Chloride 100 96 - 108 mmol/L    CO2 23 21 - 32 mmol/L    ANION GAP 11 4 - 13 mmol/L    BUN 12 5 - 25 mg/dL    Creatinine 0.65 0.60 - 1.30 mg/dL    Glucose, Fasting 259 (H) 65 - 99 mg/dL    Calcium 9.0 8.4 - 10.2 mg/dL    AST 18 13 - 39 U/L    ALT 14 7 - 52 U/L    Alkaline Phosphatase 91 34 - 104 U/L    Total Protein 7.6 6.4 - 8.4 g/dL    Albumin 4.3 3.5 - 5.0 g/dL    Total Bilirubin 0.36 0.20 - 1.00 mg/dL    eGFR 98 ml/min/1.73sq m   CBC and differential    Collection Time: 06/27/25  7:11 AM   Result Value Ref Range    WBC 15.51 (H) 4.31 - 10.16 Thousand/uL    RBC 4.27 3.81 - 5.12 Million/uL    " "Hemoglobin 12.7 11.5 - 15.4 g/dL    Hematocrit 40.7 34.8 - 46.1 %    MCV 95 82 - 98 fL    MCH 29.7 26.8 - 34.3 pg    MCHC 31.2 (L) 31.4 - 37.4 g/dL    RDW 12.9 11.6 - 15.1 %    MPV 11.6 8.9 - 12.7 fL    Platelets 412 (H) 149 - 390 Thousands/uL    nRBC 0 /100 WBCs    Segmented % 71 43 - 75 %    Immature Grans % 1 0 - 2 %    Lymphocytes % 20 14 - 44 %    Monocytes % 5 4 - 12 %    Eosinophils Relative 2 0 - 6 %    Basophils Relative 1 0 - 1 %    Absolute Neutrophils 11.08 (H) 1.85 - 7.62 Thousands/µL    Absolute Immature Grans 0.12 0.00 - 0.20 Thousand/uL    Absolute Lymphocytes 3.14 0.60 - 4.47 Thousands/µL    Absolute Monocytes 0.76 0.17 - 1.22 Thousand/µL    Eosinophils Absolute 0.31 0.00 - 0.61 Thousand/µL    Basophils Absolute 0.10 0.00 - 0.10 Thousands/µL       Patient Education     Routine physical for adults   The Basics   Written by the doctors and editors at [facility]   What is a physical? -- A physical is a routine visit, or \"check-up,\" with your doctor. You might also hear it called a \"wellness visit\" or \"preventive visit.\"  During each visit, the doctor will:   Ask about your physical and mental health   Ask about your habits, behaviors, and lifestyle   Do an exam   Give you vaccines if needed   Talk to you about any medicines you take   Give advice about your health   Answer your questions  Getting regular check-ups is an important part of taking care of your health. It can help your doctor find and treat any problems you have. But it's also important for preventing health problems.  A routine physical is different from a \"sick visit.\" A sick visit is when you see a doctor because of a health concern or problem. Since physicals are scheduled ahead of time, you can think about what you want to ask the doctor.  How often should I get a physical? -- It depends on your age and health. In general, for people age 21 years and older:   If you are younger than 50 years, you might be able to get a physical every 3 " "years.   If you are 50 years or older, your doctor might recommend a physical every year.  If you have an ongoing health condition, like diabetes or high blood pressure, your doctor will probably want to see you more often.  What happens during a physical? -- In general, each visit will include:   Physical exam - The doctor or nurse will check your height, weight, heart rate, and blood pressure. They will also look at your eyes and ears. They will ask about how you are feeling and whether you have any symptoms that bother you.   Medicines - It's a good idea to bring a list of all the medicines you take to each doctor visit. Your doctor will talk to you about your medicines and answer any questions. Tell them if you are having any side effects that bother you. You should also tell them if you are having trouble paying for any of your medicines.   Habits and behaviors - This includes:   Your diet   Your exercise habits   Whether you smoke, drink alcohol, or use drugs   Whether you are sexually active   Whether you feel safe at home  Your doctor will talk to you about things you can do to improve your health and lower your risk of health problems. They will also offer help and support. For example, if you want to quit smoking, they can give you advice and might prescribe medicines. If you want to improve your diet or get more physical activity, they can help you with this, too.   Lab tests, if needed - The tests you get will depend on your age and situation. For example, your doctor might want to check your:   Cholesterol   Blood sugar   Iron level   Vaccines - The recommended vaccines will depend on your age, health, and what vaccines you already had. Vaccines are very important because they can prevent certain serious or deadly infections.   Discussion of screening - \"Screening\" means checking for diseases or other health problems before they cause symptoms. Your doctor can recommend screening based on your age, " risk, and preferences. This might include tests to check for:   Cancer, such as breast, prostate, cervical, ovarian, colorectal, prostate, lung, or skin cancer   Sexually transmitted infections, such as chlamydia and gonorrhea   Mental health conditions like depression and anxiety  Your doctor will talk to you about the different types of screening tests. They can help you decide which screenings to have. They can also explain what the results might mean.   Answering questions - The physical is a good time to ask the doctor or nurse questions about your health. If needed, they can refer you to other doctors or specialists, too.  Adults older than 65 years often need other care, too. As you get older, your doctor will talk to you about:   How to prevent falling at home   Hearing or vision tests   Memory testing   How to take your medicines safely   Making sure that you have the help and support you need at home  All topics are updated as new evidence becomes available and our peer review process is complete.  This topic retrieved from [employer] on: May 02, 2024.  Topic 388965 Version 1.0  Release: 32.4.3 - C32.122  © 2024 UpToDate, Inc. and/or its affiliates. All rights reserved.  Consumer Information Use and Disclaimer   Disclaimer: This generalized information is a limited summary of diagnosis, treatment, and/or medication information. It is not meant to be comprehensive and should be used as a tool to help the user understand and/or assess potential diagnostic and treatment options. It does NOT include all information about conditions, treatments, medications, side effects, or risks that may apply to a specific patient. It is not intended to be medical advice or a substitute for the medical advice, diagnosis, or treatment of a health care provider based on the health care provider's examination and assessment of a patient's specific and unique circumstances. Patients must speak with a health care provider for  complete information about their health, medical questions, and treatment options, including any risks or benefits regarding use of medications. This information does not endorse any treatments or medications as safe, effective, or approved for treating a specific patient. UpToDate, Inc. and its affiliates disclaim any warranty or liability relating to this information or the use thereof.The use of this information is governed by the Terms of Use, available at https://www.woltersVidPayuwer.com/en/know/clinical-effectiveness-terms. 2024© UpToDate, Inc. and its affiliates and/or licensors. All rights reserved.  Copyright   © 2024 UpToDate, Inc. and/or its affiliates. All rights reserved.

## 2025-07-09 ENCOUNTER — OFFICE VISIT (OUTPATIENT)
Dept: FAMILY MEDICINE CLINIC | Facility: CLINIC | Age: 55
End: 2025-07-09
Payer: COMMERCIAL

## 2025-07-09 VITALS
OXYGEN SATURATION: 98 % | WEIGHT: 208 LBS | HEIGHT: 60 IN | BODY MASS INDEX: 40.84 KG/M2 | SYSTOLIC BLOOD PRESSURE: 132 MMHG | RESPIRATION RATE: 18 BRPM | TEMPERATURE: 97.2 F | DIASTOLIC BLOOD PRESSURE: 92 MMHG | HEART RATE: 88 BPM

## 2025-07-09 DIAGNOSIS — E66.01 MORBID OBESITY (HCC): ICD-10-CM

## 2025-07-09 DIAGNOSIS — E03.9 HYPOTHYROIDISM, UNSPECIFIED TYPE: ICD-10-CM

## 2025-07-09 DIAGNOSIS — E03.4 HYPOTHYROIDISM DUE TO ACQUIRED ATROPHY OF THYROID: ICD-10-CM

## 2025-07-09 DIAGNOSIS — Z00.00 ROUTINE GENERAL MEDICAL EXAMINATION AT A HEALTH CARE FACILITY: Primary | ICD-10-CM

## 2025-07-09 DIAGNOSIS — E10.65 TYPE 1 DIABETES MELLITUS WITH HYPERGLYCEMIA (HCC): ICD-10-CM

## 2025-07-09 DIAGNOSIS — E78.2 MIXED HYPERLIPIDEMIA: ICD-10-CM

## 2025-07-09 DIAGNOSIS — Z00.00 ANNUAL PHYSICAL EXAM: ICD-10-CM

## 2025-07-09 DIAGNOSIS — G43.009 MIGRAINE WITHOUT AURA AND WITHOUT STATUS MIGRAINOSUS, NOT INTRACTABLE: ICD-10-CM

## 2025-07-09 DIAGNOSIS — K21.00 GASTROESOPHAGEAL REFLUX DISEASE WITH ESOPHAGITIS WITHOUT HEMORRHAGE: ICD-10-CM

## 2025-07-09 DIAGNOSIS — Z12.31 ENCOUNTER FOR SCREENING MAMMOGRAM FOR BREAST CANCER: ICD-10-CM

## 2025-07-09 PROCEDURE — 99396 PREV VISIT EST AGE 40-64: CPT | Performed by: FAMILY MEDICINE

## 2025-07-09 RX ORDER — LEVOTHYROXINE SODIUM 150 UG/1
150 TABLET ORAL DAILY
Qty: 90 TABLET | Refills: 1 | Status: SHIPPED | OUTPATIENT
Start: 2025-07-09

## 2025-07-09 NOTE — PROGRESS NOTES
Adult Annual Physical  Name: Layla Chaves      : 1970      MRN: [id number]  Encounter Provider: Nash Cantu MD  Encounter Date: 2025   Encounter department: [facility]    :  Assessment & Plan  Routine general medical examination at a health care facility    Orders:  •  Pap Lb (Liquid-based)    Type 1 diabetes mellitus with hyperglycemia (HCC)    Lab Results   Component Value Date    HGBA1C 7.1 (H) 2025            Mixed hyperlipidemia         Hypothyroidism due to acquired atrophy of thyroid         Gastroesophageal reflux disease with esophagitis without hemorrhage         Migraine without aura and without status migrainosus, not intractable           Morbid obesity (HCC)           Encounter for screening mammogram for breast cancer    Orders:  •  Mammo screening bilateral w 3d and cad; Future    Annual physical exam             Preventive Screenings:  - Diabetes Screening: screening not indicated and has diabetes  - Cholesterol Screening: screening not indicated and has hyperlipidemia   - Hepatitis C screening: screening up-to-date   - HIV screening: screening up-to-date   - Breast cancer screening: screening up-to-date   - Colon cancer screening: screening up-to-date       Depression Screening and Follow-up Plan: Patient was screened for depression during today's encounter. They screened negative with a PHQ-2 score of 0.          History of Present Illness     Adult Annual Physical:  Patient presents for annual physical.     Diet and Physical Activity:  - Diet/Nutrition: well balanced diet, portion control, diabetic diet, low carb diet and consuming 3-5 servings of fruits/vegetables daily.  - Exercise: walking.    Depression Screening:  - PHQ-2 Score: 0    General Health:  - Sleep: 4-6 hours of sleep on average.  - Hearing: normal hearing right ear and normal hearing left ear.  - Vision: most recent eye exam < 1 year ago and wears glasses and contacts.  - Dental:  regular dental visits.    /GYN Health:  - Follows with GYN: no.   - Menopause: postmenopausal.   - History of STDs: no  - Contraception: oral contraceptives.      Advanced Care Planning:  - Has an advanced directive?: no    - Has a durable medical POA?: no      Review of Systems   Constitutional:  Positive for fatigue. Negative for chills and fever.   HENT:  Negative for congestion, ear discharge, ear pain, mouth sores, postnasal drip, sore throat and trouble swallowing.    Eyes:  Negative for pain, discharge and visual disturbance.   Respiratory:  Negative for cough, shortness of breath and wheezing.    Cardiovascular:  Negative for chest pain, palpitations and leg swelling.   Gastrointestinal:  Negative for abdominal distention, abdominal pain, blood in stool, diarrhea and nausea.   Endocrine: Negative for polydipsia, polyphagia and polyuria.   Genitourinary:  Negative for dysuria, frequency, hematuria and urgency.   Musculoskeletal:  Positive for arthralgias. Negative for gait problem and joint swelling.   Skin:  Negative for pallor and rash.   Neurological:  Negative for dizziness, syncope, speech difficulty, weakness, light-headedness, numbness and headaches.   Hematological:  Negative for adenopathy.   Psychiatric/Behavioral:  Negative for behavioral problems, confusion and sleep disturbance. The patient is not nervous/anxious.          Objective   /92   Pulse 88   Temp (!) 97.2 °F (36.2 °C) (Temporal)   Resp 18   Ht 5' (1.524 m)   Wt 94.3 kg (208 lb)   LMP  (LMP Unknown)   SpO2 98%   BMI 40.62 kg/m²     Physical Exam  Vitals and nursing note reviewed.   Constitutional:       General: She is not in acute distress.     Appearance: Normal appearance. She is well-developed. She is obese. She is not ill-appearing.   HENT:      Head: Normocephalic and atraumatic.      Right Ear: Tympanic membrane and external ear normal.      Left Ear: Tympanic membrane and external ear normal.      Nose: Nose  normal.      Mouth/Throat:      Pharynx: Oropharynx is clear.     Eyes:      General:         Right eye: No discharge.         Left eye: No discharge.      Conjunctiva/sclera: Conjunctivae normal.      Pupils: Pupils are equal, round, and reactive to light.     Neck:      Thyroid: No thyromegaly.     Cardiovascular:      Rate and Rhythm: Normal rate and regular rhythm.      Pulses: no weak pulses.           Dorsalis pedis pulses are 1+ on the right side and 1+ on the left side.        Posterior tibial pulses are 1+ on the right side and 1+ on the left side.      Heart sounds: Normal heart sounds. No murmur heard.  Pulmonary:      Effort: Pulmonary effort is normal. No respiratory distress.      Breath sounds: Normal breath sounds. No wheezing or rales.   Abdominal:      General: Bowel sounds are normal. There is no distension.      Palpations: Abdomen is soft. There is no mass.      Tenderness: There is no abdominal tenderness. There is no guarding or rebound.   Genitourinary:     General: Normal vulva.      Vagina: Normal. No vaginal discharge.      Rectum: Guaiac result negative.     Musculoskeletal:         General: No swelling, tenderness or deformity. Normal range of motion.      Cervical back: Normal range of motion and neck supple.   Feet:      Right foot:      Skin integrity: No ulcer, skin breakdown, erythema, warmth, callus or dry skin.      Left foot:      Skin integrity: No ulcer, skin breakdown, erythema, warmth, callus or dry skin.   Lymphadenopathy:      Cervical: No cervical adenopathy.     Skin:     General: Skin is warm and dry.      Capillary Refill: Capillary refill takes less than 2 seconds.      Findings: No erythema or rash.     Neurological:      General: No focal deficit present.      Mental Status: She is alert and oriented to person, place, and time.      Cranial Nerves: No cranial nerve deficit.      Sensory: No sensory deficit.      Motor: No weakness or abnormal muscle tone.       Coordination: Coordination normal.      Gait: Gait normal.      Deep Tendon Reflexes: Reflexes are normal and symmetric. Reflexes normal.     Psychiatric:         Mood and Affect: Mood normal.         Behavior: Behavior normal.         Thought Content: Thought content normal.         Judgment: Judgment normal.     Diabetic Foot Exam    Patient's shoes and socks removed.    Right Foot/Ankle   Right Foot Inspection  Skin Exam: skin normal and skin intact. No dry skin, no warmth, no callus, no erythema, no maceration, no abnormal color, no pre-ulcer, no ulcer and no callus.     Toe Exam: ROM and strength within normal limits.     Sensory   Monofilament testing: diminished    Vascular  The right DP pulse is 1+. The right PT pulse is 1+.     Left Foot/Ankle  Left Foot Inspection  Skin Exam: skin normal and skin intact. No dry skin, no warmth, no erythema, no maceration, normal color, no pre-ulcer, no ulcer and no callus.     Toe Exam: ROM and strength within normal limits.     Sensory   Monofilament testing: diminished    Vascular  The left DP pulse is 1+. The left PT pulse is 1+.     Assign Risk Category  No deformity present  Loss of protective sensation  No weak pulses  Risk: 1

## 2025-07-09 NOTE — LETTER
JULIA EVANS      Current Outpatient Medications:     Accu-Chek FastClix Lancets MISC, CHECK BLOOD SUGAR 4 TIMES DAILY, Disp: 408 each, Rfl: 1    albuterol (Proventil HFA) 90 mcg/act inhaler, Inhale 2 puffs every 6 (six) hours as needed for wheezing, Disp: 6.7 g, Rfl: 5    atorvastatin (LIPITOR) 20 mg tablet, TAKE 1 TABLET BY MOUTH ONCE  DAILY, Disp: 90 tablet, Rfl: 1    Baqsimi Two Pack 3 MG/DOSE POWD, INSERT DEVICE TIP INTO 1 NOSTRIL PUSH DEVICE PLUNGER UNTIL GREEN  LINE DISAPPEARS AS NEEDED FOR  HYPOGLYCEMIA, Disp: 2 each, Rfl: 3    Cholecalciferol (VITAMIN D3 PO), Take 1,000 Int'l Units by mouth, Disp: , Rfl:     Continuous Blood Gluc  (Dexcom G7 ) SAIMA, Use 1 Device continuous, Disp: 1 each, Rfl: 0    Contour Next Test test strip, USE TO TEST BLOOD SUGAR 5 TIMES  DAILY, Disp: 500 strip, Rfl: 3    Dasetta 7/7/7 0.5/0.75/1-35 MG-MCG per tablet, TAKE 1 TABLET BY MOUTH DAILY, Disp: 84 tablet, Rfl: 3    EPINEPHrine (EPIPEN) 0.3 mg/0.3 mL SOAJ, INJECT THE CONTENTS OF 1 PEN  INTRAMUSCULARLY AS NEEDED FOR  ALLERGIC RESPONSE AS DIRECTED BY MD. SEEK MEDICAL HELP AFTER USE, Disp: 6 each, Rfl: 0    HumaLOG 100 UNIT/ML injection, INJECT SUBCUTANEOUSLY UP   UNITS DAILY VIA INSULIN PUMP, Disp: 140 mL, Rfl: 1    levothyroxine (Synthroid) 137 mcg tablet, Take 1 tablet (137 mcg total) by mouth daily, Disp: 90 tablet, Rfl: 2    Multiple Vitamins-Minerals (CENTRUM SILVER 50+WOMEN PO), Take by mouth, Disp: , Rfl:     SUMAtriptan (IMITREX) 100 mg tablet, TAKE 1 TAB FOR MIGRAINE HEADACHE may repeat in 2 hours if necessary. Max dose 200mg in 24 hour period., Disp: 10 tablet, Rfl: 5    tirzepatide (Zepbound) 2.5 mg/0.5 mL auto-injector, Inject 0.5 mL (2.5 mg total) under the skin once a week for 28 days, Disp: 2 mL, Rfl: 0      Recent Results (from the past 4 weeks)   Infusion Set    Collection Time: 06/23/25  1:30 PM   Result Value Ref Range    Supplier Name [employer] ([employer] CGM Supplier)      "Supplier Phone Number [phone]     Order Status Third Party Delivery     Delivery Note      Delivery Request Date 06/27/2025     Date Delivered  06/26/2025     Item Description       Infusion Set, [employer], 6mm cannula, 32\" tubing, Change every 1.5 - 2 days, Set (10)   T4, free    Collection Time: 06/27/25  7:11 AM   Result Value Ref Range    Free T4 1.01 0.61 - 1.12 ng/dL   TSH, 3rd generation    Collection Time: 06/27/25  7:11 AM   Result Value Ref Range    TSH 3RD GENERATION 7.028 (H) 0.450 - 4.500 uIU/mL   Lipid panel    Collection Time: 06/27/25  7:11 AM   Result Value Ref Range    Cholesterol 164 See Comment mg/dL    Triglycerides 236 (H) See Comment mg/dL    HDL, Direct 59 >=50 mg/dL    LDL Calculated 58 0 - 100 mg/dL    Non-HDL-Chol (CHOL-HDL) 105 mg/dl   Comprehensive metabolic panel    Collection Time: 06/27/25  7:11 AM   Result Value Ref Range    Sodium 134 (L) 135 - 147 mmol/L    Potassium 4.8 3.5 - 5.3 mmol/L    Chloride 100 96 - 108 mmol/L    CO2 23 21 - 32 mmol/L    ANION GAP 11 4 - 13 mmol/L    BUN 12 5 - 25 mg/dL    Creatinine 0.65 0.60 - 1.30 mg/dL    Glucose, Fasting 259 (H) 65 - 99 mg/dL    Calcium 9.0 8.4 - 10.2 mg/dL    AST 18 13 - 39 U/L    ALT 14 7 - 52 U/L    Alkaline Phosphatase 91 34 - 104 U/L    Total Protein 7.6 6.4 - 8.4 g/dL    Albumin 4.3 3.5 - 5.0 g/dL    Total Bilirubin 0.36 0.20 - 1.00 mg/dL    eGFR 98 ml/min/1.73sq m   CBC and differential    Collection Time: 06/27/25  7:11 AM   Result Value Ref Range    WBC 15.51 (H) 4.31 - 10.16 Thousand/uL    RBC 4.27 3.81 - 5.12 Million/uL    Hemoglobin 12.7 11.5 - 15.4 g/dL    Hematocrit 40.7 34.8 - 46.1 %    MCV 95 82 - 98 fL    MCH 29.7 26.8 - 34.3 pg    MCHC 31.2 (L) 31.4 - 37.4 g/dL    RDW 12.9 11.6 - 15.1 %    MPV 11.6 8.9 - 12.7 fL    Platelets 412 (H) 149 - 390 Thousands/uL    nRBC 0 /100 WBCs    Segmented % 71 43 - 75 %    Immature Grans % 1 0 - 2 %    Lymphocytes % 20 14 - 44 %    Monocytes % 5 4 - 12 %    Eosinophils Relative " 2 0 - 6 %    Basophils Relative 1 0 - 1 %    Absolute Neutrophils 11.08 (H) 1.85 - 7.62 Thousands/µL    Absolute Immature Grans 0.12 0.00 - 0.20 Thousand/uL    Absolute Lymphocytes 3.14 0.60 - 4.47 Thousands/µL    Absolute Monocytes 0.76 0.17 - 1.22 Thousand/µL    Eosinophils Absolute 0.31 0.00 - 0.61 Thousand/µL    Basophils Absolute 0.10 0.00 - 0.10 Thousands/µL

## 2025-07-11 LAB
CYTOLOGIST CVX/VAG CYTO: NORMAL
DX ICD CODE: NORMAL
OTHER STN SPEC: NORMAL
PATH REPORT.FINAL DX SPEC: NORMAL
SL AMB NOTE:: NORMAL
SL AMB SPECIMEN ADEQUACY: NORMAL

## 2025-07-14 ENCOUNTER — OFFICE VISIT (OUTPATIENT)
Age: 55
End: 2025-07-14
Payer: COMMERCIAL

## 2025-07-14 VITALS — WEIGHT: 208 LBS | HEIGHT: 60 IN | BODY MASS INDEX: 40.84 KG/M2

## 2025-07-14 DIAGNOSIS — M76.822 POSTERIOR TIBIALIS TENDINITIS OF BOTH LOWER EXTREMITIES: Primary | ICD-10-CM

## 2025-07-14 DIAGNOSIS — M76.821 POSTERIOR TIBIALIS TENDINITIS OF BOTH LOWER EXTREMITIES: Primary | ICD-10-CM

## 2025-07-14 DIAGNOSIS — E10.65 TYPE 1 DIABETES MELLITUS WITH HYPERGLYCEMIA (HCC): ICD-10-CM

## 2025-07-14 PROCEDURE — 99213 OFFICE O/P EST LOW 20 MIN: CPT | Performed by: STUDENT IN AN ORGANIZED HEALTH CARE EDUCATION/TRAINING PROGRAM

## 2025-07-14 NOTE — ASSESSMENT & PLAN NOTE
Lab Results   Component Value Date    HGBA1C 7.1 (H) 04/18/2025          -At-risk diabetic foot examination performed.  -I educated patient on proper foot care including wearing white socks, refraining from walking barefoot, and self-examining feet every day.   -Continue supportive footwear  -Most recent hemoglobin A1c from 4/18/2025 reviewed: 7.1%  -Most recent comprehensive metabolic panel from 6/27/2025 reviewed: Fasting glucose 259 mg/dL, other than this all values within normal limits  -Diabetic Risk category 0  -RTC in 1-year

## 2025-07-14 NOTE — PROGRESS NOTES
Name: Layla Chaves      : 1970      MRN: [id number]  Encounter Provider: Mitchell Miller DPM  Encounter Date: 2025   Encounter department: [facility] [place]  :  Assessment & Plan  Posterior tibialis tendinitis of both lower extremities    Orders:    Ankle Cude ankle/Ankle Brace    Type 1 diabetes mellitus with hyperglycemia (HCC)    Lab Results   Component Value Date    HGBA1C 7.1 (H) 2025          -At-risk diabetic foot examination performed.  -I educated patient on proper foot care including wearing white socks, refraining from walking barefoot, and self-examining feet every day.   -Continue supportive footwear  -Most recent hemoglobin A1c from 2025 reviewed: 7.1%  -Most recent comprehensive metabolic panel from 2025 reviewed: Fasting glucose 259 mg/dL, other than this all values within normal limits  -Diabetic Risk category 0  -RTC in 1-year      History of Present Illness   Layla presents today for her yearly routine at risk diabetic foot exam. She denies any issues or concerns regarding her bilateral feet. She continues to wear the ASO brace on her right ankle which has been very helpful.         Review of Systems   Constitutional: Negative.    HENT: Negative.     Respiratory: Negative.     Cardiovascular: Negative.    Gastrointestinal: Negative.    Musculoskeletal: Negative.    Skin: Negative.    Neurological: Negative.           Objective   Ht 5' (1.524 m)   Wt 94.3 kg (208 lb)   LMP  (LMP Unknown)   BMI 40.62 kg/m²      Physical Exam  HENT:      Head: Atraumatic.     Cardiovascular:      Pulses: no weak pulses.           Dorsalis pedis pulses are 2+ on the right side and 2+ on the left side.        Posterior tibial pulses are 2+ on the right side and 2+ on the left side.   Pulmonary:      Effort: No respiratory distress.   Feet:      Right foot:      Skin integrity: No ulcer, skin breakdown, erythema, warmth, callus or dry skin.      Left foot:      Skin integrity:  No ulcer, skin breakdown, erythema, warmth, callus or dry skin.     Neurological:      Mental Status: She is alert and oriented to person, place, and time.      Sensory: No sensory deficit.     Patient's shoes and socks removed.    Right Foot/Ankle   Right Foot Inspection  Skin Exam: skin normal and skin intact. No dry skin, no warmth, no callus, no erythema, no maceration, no abnormal color, no pre-ulcer, no ulcer and no callus.     Toe Exam: ROM and strength within normal limits. No swelling, no tenderness, erythema and  no right toe deformity    Sensory   Monofilament testing: intact    Vascular  Capillary refills: < 3 seconds  The right DP pulse is 2+. The right PT pulse is 2+.     Right Toe  - Comprehensive Exam  Ecchymosis: none  Arch: normal  Hammertoes: absent  Claw Toes: absent  Swelling: none   Tenderness: none       Left Foot/Ankle  Left Foot Inspection  Skin Exam: skin normal and skin intact. No dry skin, no warmth, no erythema, no maceration, normal color, no pre-ulcer, no ulcer and no callus.     Toe Exam: ROM and strength within normal limits. No swelling, no tenderness, no erythema and no left toe deformity.     Sensory   Monofilament testing: intact    Vascular  Capillary refills: < 3 seconds  The left DP pulse is 2+. The left PT pulse is 2+.     Left Toe  - Comprehensive Exam  Ecchymosis: none  Arch: normal  Hammertoes: absent  Claw toes: absent  Swelling: none   Tenderness: none       Assign Risk Category  No deformity present  No loss of protective sensation  No weak pulses  Risk: 0

## 2025-07-20 DIAGNOSIS — E10.65 TYPE 1 DIABETES MELLITUS WITH HYPERGLYCEMIA (HCC): ICD-10-CM

## 2025-07-21 RX ORDER — LANCETS
EACH MISCELLANEOUS
Qty: 408 EACH | Refills: 3 | Status: SHIPPED | OUTPATIENT
Start: 2025-07-21

## 2025-07-21 NOTE — ASSESSMENT & PLAN NOTE
Lab Results   Component Value Date    HGBA1C 7.1 (H) 04/18/2025     Insulin resistance with additional insulin administered via insulin injection in addition to insulin pump, is using up to 1.2 u/kg/day of insulin.   Orders:    Tirzepatide (Mounjaro) 2.5 MG/0.5ML SOAJ; Inject 2.5 mg under the skin once a week     Performed Resulted

## 2025-08-04 DIAGNOSIS — E66.01 MORBID OBESITY (HCC): ICD-10-CM

## 2025-08-04 DIAGNOSIS — E10.65 TYPE 1 DIABETES MELLITUS WITH HYPERGLYCEMIA (HCC): Primary | ICD-10-CM

## 2025-08-06 RX ORDER — TIRZEPATIDE 2.5 MG/.5ML
INJECTION, SOLUTION SUBCUTANEOUS WEEKLY
Qty: 4 ML | Refills: 1 | Status: SHIPPED | OUTPATIENT
Start: 2025-08-06

## 2025-08-08 ENCOUNTER — TRANSCRIBE ORDERS (OUTPATIENT)
Dept: LAB | Facility: CLINIC | Age: 55
End: 2025-08-08

## 2025-08-08 ENCOUNTER — APPOINTMENT (OUTPATIENT)
Dept: LAB | Facility: CLINIC | Age: 55
End: 2025-08-08
Attending: NURSE PRACTITIONER
Payer: COMMERCIAL

## 2025-08-08 DIAGNOSIS — E03.9 HYPOTHYROIDISM, UNSPECIFIED TYPE: ICD-10-CM

## 2025-08-08 DIAGNOSIS — E10.65 TYPE 1 DIABETES MELLITUS WITH HYPERGLYCEMIA (HCC): ICD-10-CM

## 2025-08-08 LAB
ALBUMIN SERPL BCG-MCNC: 4.2 G/DL (ref 3.5–5)
ALP SERPL-CCNC: 95 U/L (ref 34–104)
ALT SERPL W P-5'-P-CCNC: 11 U/L (ref 7–52)
ANION GAP SERPL CALCULATED.3IONS-SCNC: 10 MMOL/L (ref 4–13)
AST SERPL W P-5'-P-CCNC: 14 U/L (ref 13–39)
BILIRUB SERPL-MCNC: 0.3 MG/DL (ref 0.2–1)
BUN SERPL-MCNC: 8 MG/DL (ref 5–25)
CALCIUM SERPL-MCNC: 9.2 MG/DL (ref 8.4–10.2)
CHLORIDE SERPL-SCNC: 101 MMOL/L (ref 96–108)
CHOLEST SERPL-MCNC: 150 MG/DL (ref ?–200)
CO2 SERPL-SCNC: 24 MMOL/L (ref 21–32)
CREAT SERPL-MCNC: 0.67 MG/DL (ref 0.6–1.3)
CREAT UR-MCNC: 26.8 MG/DL
EST. AVERAGE GLUCOSE BLD GHB EST-MCNC: 151 MG/DL
GFR SERPL CREATININE-BSD FRML MDRD: 97 ML/MIN/1.73SQ M
GLUCOSE P FAST SERPL-MCNC: 143 MG/DL (ref 65–99)
HBA1C MFR BLD: 6.9 %
HDLC SERPL-MCNC: 49 MG/DL
LDLC SERPL CALC-MCNC: 59 MG/DL (ref 0–100)
MICROALBUMIN UR-MCNC: 8.1 MG/L
MICROALBUMIN/CREAT 24H UR: 30 MG/G CREATININE (ref 0–30)
NONHDLC SERPL-MCNC: 101 MG/DL
POTASSIUM SERPL-SCNC: 5 MMOL/L (ref 3.5–5.3)
PROT SERPL-MCNC: 7.4 G/DL (ref 6.4–8.4)
SODIUM SERPL-SCNC: 135 MMOL/L (ref 135–147)
T4 FREE SERPL-MCNC: 1.19 NG/DL (ref 0.61–1.12)
TRIGL SERPL-MCNC: 212 MG/DL (ref ?–150)
TSH SERPL DL<=0.05 MIU/L-ACNC: 5.95 UIU/ML (ref 0.45–4.5)

## 2025-08-08 PROCEDURE — 82043 UR ALBUMIN QUANTITATIVE: CPT

## 2025-08-08 PROCEDURE — 82570 ASSAY OF URINE CREATININE: CPT

## 2025-08-08 PROCEDURE — 80061 LIPID PANEL: CPT

## 2025-08-08 PROCEDURE — 80053 COMPREHEN METABOLIC PANEL: CPT

## 2025-08-08 PROCEDURE — 84443 ASSAY THYROID STIM HORMONE: CPT

## 2025-08-08 PROCEDURE — 36415 COLL VENOUS BLD VENIPUNCTURE: CPT

## 2025-08-08 PROCEDURE — 83036 HEMOGLOBIN GLYCOSYLATED A1C: CPT

## 2025-08-08 PROCEDURE — 84439 ASSAY OF FREE THYROXINE: CPT

## 2025-08-15 DIAGNOSIS — E10.65 TYPE 1 DIABETES MELLITUS WITH HYPERGLYCEMIA, WITH LONG-TERM CURRENT USE OF INSULIN (HCC): ICD-10-CM
